# Patient Record
Sex: FEMALE | Race: ASIAN | NOT HISPANIC OR LATINO | URBAN - METROPOLITAN AREA
[De-identification: names, ages, dates, MRNs, and addresses within clinical notes are randomized per-mention and may not be internally consistent; named-entity substitution may affect disease eponyms.]

---

## 2024-04-14 ENCOUNTER — INPATIENT (INPATIENT)
Facility: HOSPITAL | Age: 84
LOS: 3 days | Discharge: HOME HEALTH | End: 2024-04-18
Attending: INTERNAL MEDICINE | Admitting: INTERNAL MEDICINE
Payer: MEDICARE

## 2024-04-14 VITALS
TEMPERATURE: 99 F | HEART RATE: 97 BPM | RESPIRATION RATE: 18 BRPM | DIASTOLIC BLOOD PRESSURE: 75 MMHG | SYSTOLIC BLOOD PRESSURE: 160 MMHG | OXYGEN SATURATION: 95 %

## 2024-04-14 DIAGNOSIS — A41.9 SEPSIS, UNSPECIFIED ORGANISM: ICD-10-CM

## 2024-04-14 LAB
ADD ON TEST-SPECIMEN IN LAB: SIGNIFICANT CHANGE UP
ALBUMIN SERPL ELPH-MCNC: 2.6 G/DL — LOW (ref 3.3–5)
ALBUMIN SERPL ELPH-MCNC: 3.9 G/DL — SIGNIFICANT CHANGE UP (ref 3.3–5)
ALP SERPL-CCNC: 44 U/L — SIGNIFICANT CHANGE UP (ref 40–120)
ALP SERPL-CCNC: 85 U/L — SIGNIFICANT CHANGE UP (ref 40–120)
ALT FLD-CCNC: 13 U/L — SIGNIFICANT CHANGE UP (ref 4–33)
ALT FLD-CCNC: 16 U/L — SIGNIFICANT CHANGE UP (ref 4–33)
ANION GAP SERPL CALC-SCNC: 13 MMOL/L — SIGNIFICANT CHANGE UP (ref 7–14)
ANION GAP SERPL CALC-SCNC: 14 MMOL/L — SIGNIFICANT CHANGE UP (ref 7–14)
ANION GAP SERPL CALC-SCNC: 17 MMOL/L — HIGH (ref 7–14)
ANISOCYTOSIS BLD QL: SLIGHT — SIGNIFICANT CHANGE UP
APPEARANCE UR: CLEAR — SIGNIFICANT CHANGE UP
APTT BLD: 28.8 SEC — SIGNIFICANT CHANGE UP (ref 24.5–35.6)
AST SERPL-CCNC: 35 U/L — HIGH (ref 4–32)
AST SERPL-CCNC: 43 U/L — HIGH (ref 4–32)
B-OH-BUTYR SERPL-SCNC: 0.3 MMOL/L — SIGNIFICANT CHANGE UP (ref 0–0.4)
BACTERIA # UR AUTO: ABNORMAL /HPF
BASE EXCESS BLDV CALC-SCNC: -5.1 MMOL/L — LOW (ref -2–3)
BASE EXCESS BLDV CALC-SCNC: 1.9 MMOL/L — SIGNIFICANT CHANGE UP (ref -2–3)
BASOPHILS # BLD AUTO: 0.03 K/UL — SIGNIFICANT CHANGE UP (ref 0–0.2)
BASOPHILS NFR BLD AUTO: 0.4 % — SIGNIFICANT CHANGE UP (ref 0–2)
BILIRUB SERPL-MCNC: 0.4 MG/DL — SIGNIFICANT CHANGE UP (ref 0.2–1.2)
BILIRUB SERPL-MCNC: 0.5 MG/DL — SIGNIFICANT CHANGE UP (ref 0.2–1.2)
BILIRUB UR-MCNC: NEGATIVE — SIGNIFICANT CHANGE UP
BLOOD GAS VENOUS COMPREHENSIVE RESULT: SIGNIFICANT CHANGE UP
BLOOD GAS VENOUS COMPREHENSIVE RESULT: SIGNIFICANT CHANGE UP
BUN SERPL-MCNC: 35 MG/DL — HIGH (ref 7–23)
BUN SERPL-MCNC: 37 MG/DL — HIGH (ref 7–23)
BUN SERPL-MCNC: 43 MG/DL — HIGH (ref 7–23)
CALCIUM SERPL-MCNC: 7.3 MG/DL — LOW (ref 8.4–10.5)
CALCIUM SERPL-MCNC: 7.9 MG/DL — LOW (ref 8.4–10.5)
CALCIUM SERPL-MCNC: 9.8 MG/DL — SIGNIFICANT CHANGE UP (ref 8.4–10.5)
CAST: 0 /LPF — SIGNIFICANT CHANGE UP (ref 0–4)
CHLORIDE BLDV-SCNC: 104 MMOL/L — SIGNIFICANT CHANGE UP (ref 96–108)
CHLORIDE BLDV-SCNC: 99 MMOL/L — SIGNIFICANT CHANGE UP (ref 96–108)
CHLORIDE SERPL-SCNC: 104 MMOL/L — SIGNIFICANT CHANGE UP (ref 98–107)
CHLORIDE SERPL-SCNC: 107 MMOL/L — SIGNIFICANT CHANGE UP (ref 98–107)
CHLORIDE SERPL-SCNC: 98 MMOL/L — SIGNIFICANT CHANGE UP (ref 98–107)
CO2 BLDV-SCNC: 23 MMOL/L — SIGNIFICANT CHANGE UP (ref 22–26)
CO2 BLDV-SCNC: 26.7 MMOL/L — HIGH (ref 22–26)
CO2 SERPL-SCNC: 18 MMOL/L — LOW (ref 22–31)
CO2 SERPL-SCNC: 19 MMOL/L — LOW (ref 22–31)
CO2 SERPL-SCNC: 22 MMOL/L — SIGNIFICANT CHANGE UP (ref 22–31)
COLOR SPEC: YELLOW — SIGNIFICANT CHANGE UP
CREAT SERPL-MCNC: 1.48 MG/DL — HIGH (ref 0.5–1.3)
CREAT SERPL-MCNC: 1.52 MG/DL — HIGH (ref 0.5–1.3)
CREAT SERPL-MCNC: 1.59 MG/DL — HIGH (ref 0.5–1.3)
DIFF PNL FLD: ABNORMAL
EGFR: 32 ML/MIN/1.73M2 — LOW
EGFR: 34 ML/MIN/1.73M2 — LOW
EGFR: 35 ML/MIN/1.73M2 — LOW
EOSINOPHIL # BLD AUTO: 0 K/UL — SIGNIFICANT CHANGE UP (ref 0–0.5)
EOSINOPHIL NFR BLD AUTO: 0 % — SIGNIFICANT CHANGE UP (ref 0–6)
FLUAV AG NPH QL: SIGNIFICANT CHANGE UP
FLUBV AG NPH QL: SIGNIFICANT CHANGE UP
GAS PNL BLDV: 133 MMOL/L — LOW (ref 136–145)
GAS PNL BLDV: 134 MMOL/L — LOW (ref 136–145)
GAS PNL BLDV: SIGNIFICANT CHANGE UP
GAS PNL BLDV: SIGNIFICANT CHANGE UP
GIANT PLATELETS BLD QL SMEAR: PRESENT — SIGNIFICANT CHANGE UP
GLUCOSE BLDV-MCNC: 129 MG/DL — HIGH (ref 70–99)
GLUCOSE BLDV-MCNC: 223 MG/DL — HIGH (ref 70–99)
GLUCOSE SERPL-MCNC: 121 MG/DL — HIGH (ref 70–99)
GLUCOSE SERPL-MCNC: 127 MG/DL — HIGH (ref 70–99)
GLUCOSE SERPL-MCNC: 232 MG/DL — HIGH (ref 70–99)
GLUCOSE UR QL: >=1000 MG/DL
HCO3 BLDV-SCNC: 22 MMOL/L — SIGNIFICANT CHANGE UP (ref 22–29)
HCO3 BLDV-SCNC: 26 MMOL/L — SIGNIFICANT CHANGE UP (ref 22–29)
HCT VFR BLD CALC: 34.9 % — SIGNIFICANT CHANGE UP (ref 34.5–45)
HCT VFR BLDA CALC: 32 % — LOW (ref 34.5–46.5)
HCT VFR BLDA CALC: 39 % — SIGNIFICANT CHANGE UP (ref 34.5–46.5)
HGB BLD CALC-MCNC: 10.7 G/DL — LOW (ref 11.7–16.1)
HGB BLD CALC-MCNC: 13 G/DL — SIGNIFICANT CHANGE UP (ref 11.7–16.1)
HGB BLD-MCNC: 12.5 G/DL — SIGNIFICANT CHANGE UP (ref 11.5–15.5)
HYPOCHROMIA BLD QL: SLIGHT — SIGNIFICANT CHANGE UP
IANC: 8.16 K/UL — HIGH (ref 1.8–7.4)
INR BLD: 0.97 RATIO — SIGNIFICANT CHANGE UP (ref 0.85–1.18)
KETONES UR-MCNC: NEGATIVE MG/DL — SIGNIFICANT CHANGE UP
LACTATE BLDV-MCNC: 2.9 MMOL/L — HIGH (ref 0.5–2)
LACTATE BLDV-MCNC: 3.4 MMOL/L — HIGH (ref 0.5–2)
LACTATE SERPL-SCNC: 2.6 MMOL/L — HIGH (ref 0.5–2)
LEUKOCYTE ESTERASE UR-ACNC: ABNORMAL
LYMPHOCYTES # BLD AUTO: 0 % — LOW (ref 13–44)
LYMPHOCYTES # BLD AUTO: 0 K/UL — LOW (ref 1–3.3)
MAGNESIUM SERPL-MCNC: 1.6 MG/DL — SIGNIFICANT CHANGE UP (ref 1.6–2.6)
MANUAL SMEAR VERIFICATION: SIGNIFICANT CHANGE UP
MCHC RBC-ENTMCNC: 30.6 PG — SIGNIFICANT CHANGE UP (ref 27–34)
MCHC RBC-ENTMCNC: 35.8 GM/DL — SIGNIFICANT CHANGE UP (ref 32–36)
MCV RBC AUTO: 85.5 FL — SIGNIFICANT CHANGE UP (ref 80–100)
METAMYELOCYTES # FLD: 2.6 % — HIGH (ref 0–1)
MICROCYTES BLD QL: SIGNIFICANT CHANGE UP
MONOCYTES # BLD AUTO: 0.23 K/UL — SIGNIFICANT CHANGE UP (ref 0–0.9)
MONOCYTES NFR BLD AUTO: 2.7 % — SIGNIFICANT CHANGE UP (ref 2–14)
NEUTROPHILS # BLD AUTO: 7.89 K/UL — HIGH (ref 1.8–7.4)
NEUTROPHILS NFR BLD AUTO: 66.1 % — SIGNIFICANT CHANGE UP (ref 43–77)
NEUTS BAND # BLD: 25.6 % — CRITICAL HIGH (ref 0–6)
NITRITE UR-MCNC: POSITIVE
NRBC # BLD: 0 /100 WBCS — SIGNIFICANT CHANGE UP (ref 0–0)
PCO2 BLDV: 36 MMHG — LOW (ref 39–52)
PCO2 BLDV: 46 MMHG — SIGNIFICANT CHANGE UP (ref 39–52)
PH BLDV: 7.28 — LOW (ref 7.32–7.43)
PH BLDV: 7.46 — HIGH (ref 7.32–7.43)
PH UR: 5.5 — SIGNIFICANT CHANGE UP (ref 5–8)
PHOSPHATE SERPL-MCNC: 2.2 MG/DL — LOW (ref 2.5–4.5)
PLAT MORPH BLD: ABNORMAL
PLATELET # BLD AUTO: 112 K/UL — LOW (ref 150–400)
PLATELET COUNT - ESTIMATE: ABNORMAL
PO2 BLDV: 29 MMHG — SIGNIFICANT CHANGE UP (ref 25–45)
PO2 BLDV: 45 MMHG — SIGNIFICANT CHANGE UP (ref 25–45)
POIKILOCYTOSIS BLD QL AUTO: SLIGHT — SIGNIFICANT CHANGE UP
POLYCHROMASIA BLD QL SMEAR: SIGNIFICANT CHANGE UP
POTASSIUM BLDV-SCNC: 2.4 MMOL/L — CRITICAL LOW (ref 3.5–5.1)
POTASSIUM BLDV-SCNC: 2.7 MMOL/L — CRITICAL LOW (ref 3.5–5.1)
POTASSIUM SERPL-MCNC: 2.5 MMOL/L — CRITICAL LOW (ref 3.5–5.3)
POTASSIUM SERPL-MCNC: 2.7 MMOL/L — CRITICAL LOW (ref 3.5–5.3)
POTASSIUM SERPL-MCNC: 2.9 MMOL/L — CRITICAL LOW (ref 3.5–5.3)
POTASSIUM SERPL-SCNC: 2.5 MMOL/L — CRITICAL LOW (ref 3.5–5.3)
POTASSIUM SERPL-SCNC: 2.7 MMOL/L — CRITICAL LOW (ref 3.5–5.3)
POTASSIUM SERPL-SCNC: 2.9 MMOL/L — CRITICAL LOW (ref 3.5–5.3)
PROT SERPL-MCNC: 4.8 G/DL — LOW (ref 6–8.3)
PROT SERPL-MCNC: 7.1 G/DL — SIGNIFICANT CHANGE UP (ref 6–8.3)
PROT UR-MCNC: NEGATIVE MG/DL — SIGNIFICANT CHANGE UP
PROTHROM AB SERPL-ACNC: 11 SEC — SIGNIFICANT CHANGE UP (ref 9.5–13)
RBC # BLD: 4.08 M/UL — SIGNIFICANT CHANGE UP (ref 3.8–5.2)
RBC # FLD: 14.4 % — SIGNIFICANT CHANGE UP (ref 10.3–14.5)
RBC BLD AUTO: ABNORMAL
RBC CASTS # UR COMP ASSIST: 12 /HPF — HIGH (ref 0–4)
RSV RNA NPH QL NAA+NON-PROBE: SIGNIFICANT CHANGE UP
SAO2 % BLDV: 41.3 % — LOW (ref 67–88)
SAO2 % BLDV: 77.6 % — SIGNIFICANT CHANGE UP (ref 67–88)
SARS-COV-2 RNA SPEC QL NAA+PROBE: SIGNIFICANT CHANGE UP
SMUDGE CELLS # BLD: PRESENT — SIGNIFICANT CHANGE UP
SODIUM SERPL-SCNC: 137 MMOL/L — SIGNIFICANT CHANGE UP (ref 135–145)
SODIUM SERPL-SCNC: 137 MMOL/L — SIGNIFICANT CHANGE UP (ref 135–145)
SODIUM SERPL-SCNC: 138 MMOL/L — SIGNIFICANT CHANGE UP (ref 135–145)
SP GR SPEC: 1.02 — SIGNIFICANT CHANGE UP (ref 1–1.03)
SQUAMOUS # UR AUTO: 1 /HPF — SIGNIFICANT CHANGE UP (ref 0–5)
STOMATOCYTES BLD QL SMEAR: SLIGHT — SIGNIFICANT CHANGE UP
TROPONIN T, HIGH SENSITIVITY RESULT: 21 NG/L — SIGNIFICANT CHANGE UP
UROBILINOGEN FLD QL: 0.2 MG/DL — SIGNIFICANT CHANGE UP (ref 0.2–1)
VARIANT LYMPHS # BLD: 2.6 % — SIGNIFICANT CHANGE UP (ref 0–6)
WBC # BLD: 8.6 K/UL — SIGNIFICANT CHANGE UP (ref 3.8–10.5)
WBC # FLD AUTO: 8.6 K/UL — SIGNIFICANT CHANGE UP (ref 3.8–10.5)
WBC UR QL: 9 /HPF — HIGH (ref 0–5)

## 2024-04-14 PROCEDURE — 74177 CT ABD & PELVIS W/CONTRAST: CPT | Mod: 26,MC

## 2024-04-14 PROCEDURE — 99285 EMERGENCY DEPT VISIT HI MDM: CPT

## 2024-04-14 PROCEDURE — 71045 X-RAY EXAM CHEST 1 VIEW: CPT | Mod: 26

## 2024-04-14 PROCEDURE — 99283 EMERGENCY DEPT VISIT LOW MDM: CPT | Mod: GC

## 2024-04-14 PROCEDURE — 74176 CT ABD & PELVIS W/O CONTRAST: CPT | Mod: 26,59,MC

## 2024-04-14 RX ORDER — SODIUM CHLORIDE 9 MG/ML
1000 INJECTION, SOLUTION INTRAVENOUS ONCE
Refills: 0 | Status: COMPLETED | OUTPATIENT
Start: 2024-04-14 | End: 2024-04-14

## 2024-04-14 RX ORDER — SODIUM CHLORIDE 9 MG/ML
1000 INJECTION INTRAMUSCULAR; INTRAVENOUS; SUBCUTANEOUS ONCE
Refills: 0 | Status: COMPLETED | OUTPATIENT
Start: 2024-04-14 | End: 2024-04-14

## 2024-04-14 RX ORDER — VANCOMYCIN HCL 1 G
1000 VIAL (EA) INTRAVENOUS ONCE
Refills: 0 | Status: COMPLETED | OUTPATIENT
Start: 2024-04-14 | End: 2024-04-14

## 2024-04-14 RX ORDER — NOREPINEPHRINE BITARTRATE/D5W 8 MG/250ML
0.05 PLASTIC BAG, INJECTION (ML) INTRAVENOUS
Qty: 8 | Refills: 0 | Status: DISCONTINUED | OUTPATIENT
Start: 2024-04-14 | End: 2024-04-14

## 2024-04-14 RX ORDER — ACETAMINOPHEN 500 MG
1000 TABLET ORAL ONCE
Refills: 0 | Status: COMPLETED | OUTPATIENT
Start: 2024-04-14 | End: 2024-04-14

## 2024-04-14 RX ORDER — POTASSIUM CHLORIDE 20 MEQ
40 PACKET (EA) ORAL ONCE
Refills: 0 | Status: COMPLETED | OUTPATIENT
Start: 2024-04-14 | End: 2024-04-14

## 2024-04-14 RX ORDER — POTASSIUM CHLORIDE 20 MEQ
10 PACKET (EA) ORAL
Refills: 0 | Status: COMPLETED | OUTPATIENT
Start: 2024-04-14 | End: 2024-04-15

## 2024-04-14 RX ORDER — PIPERACILLIN AND TAZOBACTAM 4; .5 G/20ML; G/20ML
3.38 INJECTION, POWDER, LYOPHILIZED, FOR SOLUTION INTRAVENOUS ONCE
Refills: 0 | Status: COMPLETED | OUTPATIENT
Start: 2024-04-14 | End: 2024-04-14

## 2024-04-14 RX ORDER — MAGNESIUM SULFATE 500 MG/ML
1 VIAL (ML) INJECTION ONCE
Refills: 0 | Status: COMPLETED | OUTPATIENT
Start: 2024-04-14 | End: 2024-04-14

## 2024-04-14 RX ORDER — POTASSIUM CHLORIDE 20 MEQ
10 PACKET (EA) ORAL
Refills: 0 | Status: COMPLETED | OUTPATIENT
Start: 2024-04-14 | End: 2024-04-14

## 2024-04-14 RX ORDER — SODIUM CHLORIDE 9 MG/ML
1000 INJECTION, SOLUTION INTRAVENOUS
Refills: 0 | Status: DISCONTINUED | OUTPATIENT
Start: 2024-04-14 | End: 2024-04-15

## 2024-04-14 RX ADMIN — SODIUM CHLORIDE 1000 MILLILITER(S): 9 INJECTION INTRAMUSCULAR; INTRAVENOUS; SUBCUTANEOUS at 13:44

## 2024-04-14 RX ADMIN — SODIUM CHLORIDE 1000 MILLILITER(S): 9 INJECTION INTRAMUSCULAR; INTRAVENOUS; SUBCUTANEOUS at 15:03

## 2024-04-14 RX ADMIN — Medication 250 MILLIGRAM(S): at 14:36

## 2024-04-14 RX ADMIN — Medication 1000 MILLIGRAM(S): at 14:03

## 2024-04-14 RX ADMIN — SODIUM CHLORIDE 1000 MILLILITER(S): 9 INJECTION, SOLUTION INTRAVENOUS at 19:22

## 2024-04-14 RX ADMIN — Medication 100 GRAM(S): at 15:51

## 2024-04-14 RX ADMIN — Medication 1 GRAM(S): at 16:51

## 2024-04-14 RX ADMIN — Medication 100 MILLIEQUIVALENT(S): at 17:24

## 2024-04-14 RX ADMIN — Medication 1000 MILLIGRAM(S): at 15:36

## 2024-04-14 RX ADMIN — SODIUM CHLORIDE 1000 MILLILITER(S): 9 INJECTION INTRAMUSCULAR; INTRAVENOUS; SUBCUTANEOUS at 14:44

## 2024-04-14 RX ADMIN — SODIUM CHLORIDE 1000 MILLILITER(S): 9 INJECTION INTRAMUSCULAR; INTRAVENOUS; SUBCUTANEOUS at 17:51

## 2024-04-14 RX ADMIN — SODIUM CHLORIDE 300 MILLILITER(S): 9 INJECTION INTRAMUSCULAR; INTRAVENOUS; SUBCUTANEOUS at 17:24

## 2024-04-14 RX ADMIN — Medication 100 MILLIEQUIVALENT(S): at 17:32

## 2024-04-14 RX ADMIN — SODIUM CHLORIDE 150 MILLILITER(S): 9 INJECTION, SOLUTION INTRAVENOUS at 21:59

## 2024-04-14 RX ADMIN — PIPERACILLIN AND TAZOBACTAM 3.38 GRAM(S): 4; .5 INJECTION, POWDER, LYOPHILIZED, FOR SOLUTION INTRAVENOUS at 14:34

## 2024-04-14 RX ADMIN — Medication 10 MILLIEQUIVALENT(S): at 18:32

## 2024-04-14 RX ADMIN — Medication 100 MILLIEQUIVALENT(S): at 19:40

## 2024-04-14 RX ADMIN — Medication 400 MILLIGRAM(S): at 13:48

## 2024-04-14 RX ADMIN — PIPERACILLIN AND TAZOBACTAM 200 GRAM(S): 4; .5 INJECTION, POWDER, LYOPHILIZED, FOR SOLUTION INTRAVENOUS at 14:04

## 2024-04-14 RX ADMIN — SODIUM CHLORIDE 1000 MILLILITER(S): 9 INJECTION, SOLUTION INTRAVENOUS at 18:22

## 2024-04-14 RX ADMIN — Medication 10 MILLIEQUIVALENT(S): at 18:24

## 2024-04-14 RX ADMIN — Medication 1000 MILLIGRAM(S): at 14:18

## 2024-04-14 RX ADMIN — SODIUM CHLORIDE 1000 MILLILITER(S): 9 INJECTION INTRAMUSCULAR; INTRAVENOUS; SUBCUTANEOUS at 14:03

## 2024-04-14 NOTE — CONSULT NOTE ADULT - ATTENDING COMMENTS
82 yo female with pmh as above presenting with fever and found to have acute pyelonephritis and possible R ureteral stricture.  She received 3L of crystalloid over the last 8 hours and BP was soft.  After another bolus of fluid and maintenance ivf her BP stabilized.  She is awake, alert, oriented, mandarin speaking with daughter interpreting.  In NAD sBP now 113  HR 70's  RA 98%  She has been evaluated by urology, received Abx, is hemodynamically stable, and does not require MICU level of care.  D/W pt's daughter and with ED team

## 2024-04-14 NOTE — ED ADULT NURSE NOTE - NSFALLHARMRISKINTERV_ED_ALL_ED

## 2024-04-14 NOTE — ED ADULT NURSE NOTE - OBJECTIVE STATEMENT
Received patient in room 19 c/o hyperglycemia. Patient denies SOB, chest pain. PMHX DM, HTN, CVA w/left sided weakness. Patient is A&OX1, airway patent, breathing unlabored and even, radial pulses palpable. Side rails up and safety maintained. Fall precaution in place. Call bells within reach. Family at the bedside.

## 2024-04-14 NOTE — CONSULT NOTE ADULT - ASSESSMENT
HPI: 82 Y/O F w/ PMH of DM, HTN, CVA w/ LT sided deficits presents to ER w/ fever. Per daughter has increasing lethargy, elevated blood sugar, episode of nausea today w/ vomiting prompting call to EMS. Associated chills and subjective fever. No hx of urological conditions/surgery. Denies dizziness, headache, chest pain, shortness of breath, dysuria or hematuria. Noted to be febrile to 104.5 rectal. During ER course no serum WBC count, Bandemia 25K, Cr 1.48, K+ 2.9, elevated lactate 3.7, UA demonstrating positive Leukocytes and Nitrites. CT concerning for Extensive right retroperitoneal stranding/inflammatory change felt to be secondary to possible infection/inflammation of the right kidney. Abrupt changing caliber of the right ureter as described above at the level of the mid right ureter; etiology indeterminate. It appears to be a stricture with resultant upstream hydronephrosis. Given IV Vancomycin, Zosyn, Tylenol and Potassium repleted.      No acute urological intervention  Recommend CT abd/pelvis non-contrast assess for obstruction at RT mid RT ureter  ABX per primary team  Trend CR  Follow up urine CX  [facility] Maximum Drainage   HPI: 84 Y/O F w/ PMH of DM, HTN, CVA w/ LT sided deficits presents to ER w/ fever. Per daughter has increasing lethargy, elevated blood sugar, episode of nausea today w/ vomiting prompting call to EMS. Associated chills and subjective fever. No hx of urological conditions/surgery. Denies dizziness, headache, chest pain, shortness of breath, dysuria or hematuria. Noted to be febrile to 104.5 rectal. During ER course no serum WBC count, Bandemia 25K, Cr 1.48, K+ 2.9, elevated lactate 3.7, UA demonstrating positive Leukocytes and Nitrites. CT concerning for Extensive right retroperitoneal stranding/inflammatory change felt to be secondary to possible infection/inflammation of the right kidney. Abrupt changing caliber of the right ureter as described above at the level of the mid right ureter; etiology indeterminate. It appears to be a stricture with resultant upstream hydronephrosis. Given IV Vancomycin, Zosyn, Tylenol and Potassium repleted.      No acute urological intervention  Recommend CT abd/pelvis non-contrast assess for obstruction at RT mid RT ureter which should catch delayed phase to see if contrast is going past area of questionable stricture  Patient is unlikely to have ureteral stricture given that she has never had ureteral manipulation/instrumentation and no history of radiation  Mild hydronephrosis in R kidney can be seen transiently in setting of pyelonephritis altering peristalsis of the ureter  Broad spectrum antibiotics  If patient does not improve with conservative measures would plan to reimage and then if patient continues to have hydronephrosis would consider intervention with PCN vs stent (likely favor PCN given concern for possible stricture)  ABX per primary team  Trend CR  Follow up urine CX  [facility] Maximum Drainage  Discussed with Dr. Campbell

## 2024-04-14 NOTE — ED PROVIDER NOTE - CLINICAL SUMMARY MEDICAL DECISION MAKING FREE TEXT BOX
84 yo female with vomiting and elevated FS, DKA?  pt also has tenderness in lower abd, UTI?   pt found to be febrile to 104  will do sepsis work up

## 2024-04-14 NOTE — CONSULT NOTE ADULT - SUBJECTIVE AND OBJECTIVE BOX
Urology Consult    HPI: 82 Y/O F w/ PMH of DM, HTN, CVA w/ LT sided deficits presents to ER w/ fever. Per daughter has increasing lethargy, elevated blood sugar, episode of nausea today w/ vomiting prompting call to EMS. Associated chills and subjective fever. No hx of urological conditions/surgery. Denies dizziness, headache, chest pain, shortness of breath, dysuria or hematuria. Noted to be febrile to 104.5 rectal. During ER course no serum WBC count, Bandemia 25K, Cr 1.48, K+ 2.9, elevated lactate 3.7, UA demonstrating positive Leukocytes and Nitrites. CT concerning for Extensive right retroperitoneal stranding/inflammatory change felt to be secondary to possible infection/inflammation of the right kidney. Abrupt changing caliber of the right ureter as described above at the level of the mid right ureter; etiology indeterminate. It appears to be a stricture with resultant upstream hydronephrosis. Given IV Vancomycin, Zosyn, Tylenol and Potassium repleted.      PAST MEDICAL & SURGICAL HISTORY:    FAMILY HISTORY:    SOCIAL HISTORY:   Tobacco hx:    MEDICATIONS  (STANDING):  potassium chloride  10 mEq/100 mL IVPB 10 milliEquivalent(s) IV Intermittent every 1 hour    MEDICATIONS  (PRN):    Allergies    Allergy Status Unknown    Intolerances      REVIEW OF SYSTEMS: Pertinent positives and negatives as stated in HPI, otherwise negative    Vital signs  T(C): 36.9 (24 @ 19:20), Max: 40.3 (24 @ 13:28)  HR: 75 (24 @ 19:20)  BP: 104/58 (24 @ 19:20)  SpO2: 100% (24 @ 19:20)  Wt(kg): --    Vital signs reviewed.   CONSTITUTIONAL: Well-appearing; well-nourished; in no apparent distress. Non-toxic appearing.   HEAD: Normocephalic, atraumatic.  EYES: Normal conjunctiva and no sclera injection noted  ENT: Normal nose; no rhinorrhea.  CARD: Normal S1, S2  RESP: Normal chest excursion with respiration; breath sounds clear and equal bilaterally  ABD/GI: soft, non-distended; generalized tenderness  EXT/MS: moves all extremities; distal pulses are normal, no pedal edema.  SKIN: Normal for age and race; warm; dry; good turgor; no apparent lesions or exudate noted.  NEURO: Awake, alert, oriented x 3.  PSYCH: Normal mood; appropriate affect.      LABS:     @ 13:52    WBC 8.60  / Hct 34.9  / SCr 1.48         137  |  98  |  43<H>  ----------------------------<  232<H>  2.9<LL>   |  22  |  1.48<H>    Ca    9.8      2024 13:52  Mg     1.70         TPro  7.1  /  Alb  3.9  /  TBili  0.5  /  DBili  x   /  AST  35<H>  /  ALT  13  /  AlkPhos  85      PT/INR - ( 2024 13:52 )   PT: 11.0 sec;   INR: 0.97 ratio         PTT - ( 2024 13:52 )  PTT:28.8 sec  Urinalysis Basic - ( 2024 15:17 )    Color: Yellow / Appearance: Clear / S.017 / pH: x  Gluc: x / Ketone: Negative mg/dL  / Bili: Negative / Urobili: 0.2 mg/dL   Blood: x / Protein: Negative mg/dL / Nitrite: Positive   Leuk Esterase: Trace / RBC: 12 /HPF / WBC 9 /HPF   Sq Epi: x / Non Sq Epi: 1 /HPF / Bacteria: Moderate /HPF        Urine Cx:   Blood Cx:    RADIOLOGY:      ACC: [id number] EXAM:  CT ABDOMEN AND PELVIS IC   ORDERED BY: JEAN CLAUDE ALVAREZ     PROCEDURE DATE:  2024          INTERPRETATION:  CLINICAL INFORMATION: Abdominal pain, fever    COMPARISON: None.    CONTRAST/COMPLICATIONS:  IV Contrast: Omnipaque 350  90 cc administered   10 cc discarded  Oral Contrast: NONE  Complications: None reported at time of study completion    PROCEDURE:  CT of the Abdomen and Pelvis was performed.  Sagittal and coronal reformats were performed.    FINDINGS:  LOWER CHEST: Bibasilar mild dependent focal atelectasis.    LIVER: Within normal limits.  BILE DUCTS: Normal caliber.  GALLBLADDER: Not visualized  SPLEEN: Within normal limits.  PANCREAS: Within normal limits.  ADRENALS: Within normal limits.  KIDNEYS/URETERS: Moderate right pelviectasis and ureterectasis to the   level of the mid right ureter where there is a transition point to a   narrow caliber (601:47), where there appears to be a stricture or   narrowing in the ureter. Punctate nonobstructing calculus upper pole   right kidney. Extensive right perinephric fat stranding which extends   into the right retroperitoneum.. Unremarkable appearance of left kidney,   aside from mild atrophic changes    BLADDER: Within normal limits.  REPRODUCTIVE ORGANS: Within normal limits    BOWEL: No bowel obstruction. Appendix is normal.  PERITONEUM: No ascites.  VESSELS: Atherosclerotic calcifications  RETROPERITONEUM/LYMPH NODES: Right retroperitoneal inflammatory changes   as described above extending from the right perinephric space into the   right paracolic gutter with trace right paracolic gutter free fluid   (2:83).  ABDOMINAL WALL: Within normal limits.  BONES: Degenerative changes.    IMPRESSION:  Extensive right retroperitoneal stranding/inflammatory change felt to be   secondary to possible infection/inflammation of the right kidney. Abrupt   changing caliber of the right ureter as described above at the level of   the mid right ureter; etiology indeterminate. It appears to be a   stricture with resultant upstream hydronephrosis. If clinically indicated   direct visualization may be helpful for further evaluation.      --- End of Report ---        DEMETRIA ARAYA MD; Attending Radiologist  This document has been electronically signed. 2024  6:24PM
CHIEF COMPLAINT: Hyperglycemia    History provided mainly by daughter at bedside, patient is Mandarin speaking, preferred daughter at bedside to translate.   HPI: Ms. Messina is an 82 YO woman with PMH of DM2, HTN, CVA w/ LT sided deficits, recurrent UTIs (per daughter), who presents to ER with hyperglycemia. Per daughter, patient noted with fever, increasing lethargy, elevated blood sugar, b/l back pain and an episode of vomiting prompting call to EMS. She had lab work done, resulted yesterday with elevated BUN/Creatinine per daughter. Denies dizziness, headache, chest pain, shortness of breath, dysuria or hematuria. Notes chronic left leg pain. Daughter notes patient intermittently has had edema of the left lower extremity but that seems to have resolved today. Patient has been urinating more frequently than normal per daughter. Patient herself denies any pain, difficulty breathing. She had a brief period of disorientation in the ED, but at time of interview per daughter is oriented and behaving at baseline.     In the ED, her vitals were concerning for rectal temperature of 104.5, initially normotensive. Labs concerning for 25% bands, LAYLA, hypokalemia and elevated lactate. UA positive for bacteria, leukocyte esterase and nitrites. She was given 3L of fluids over 8 hours, and CT abdomen/pelvis with IV contrast was done which showed R retroperitoneal stranding and an abrupt change in caliber of the R ureter. Urology was consulted, felt patient likely has stricture vs. abnormal peristalsis of ureter iso pyelonephritis. She was started on vancomycin and zosyn and repeat CT abdomen/pelvis without contrast performed. MICU called for worsening hypotension, consideration for pressor support.     PAST MEDICAL & SURGICAL HISTORY:   PMH: as above  PSH: cholecystectomy    FAMILY HISTORY:      SOCIAL HISTORY:  Smoking: Denies  EtOH Use: Denies  Marital Status:  Occupation:  Recent Travel:  Country of Birth:  Advance Directives:    Allergies: NDKA        HOME MEDICATIONS:  losartan 50 qD, B complex, diclofenac gel BID, Tradjenta 5 mg qD, calcitriol 0.25 mcg qD, colace 100 mg qD, atorvastatin 10 qD, nifedipine ER 60 mg qD, metoprolol succinate ER 25 mg qD, nephplex qD, artificial tears, Plavix 75 qD, HCTZ 12.5 qD, vascepa 0.5g x4 qD, isosorbide mononitrate ER 30 mg qD, Jardiance 10 mg qD.     REVIEW OF SYSTEMS:  [X] All other systems negative except as per HPI  [ ] Unable to assess ROS because ________    OBJECTIVE:  ICU Vital Signs Last 24 Hrs  T(C): 36.9 (2024 19:20), Max: 40.3 (2024 13:28)  T(F): 98.5 (2024 19:20), Max: 104.5 (2024 13:28)  HR: 84 (2024 21:45) (71 - 97)  BP: 116/53 (2024 21:45) (78/48 - 160/75)  BP(mean): 71 (2024 21:45) (59 - 71)  ABP: --  ABP(mean): --  RR: 18 (2024 21:45) (16 - 26)  SpO2: 96% (2024 21:45) (95% - 100%)    O2 Parameters below as of 2024 21:45  Patient On (Oxygen Delivery Method): room air              CAPILLARY BLOOD GLUCOSE      POCT Blood Glucose.: 170 mg/dL (2024 17:26)      PHYSICAL EXAM:  GENERAL: Lying in bed in no acute distress, responding to questions appropriately  EYES: Conjunctiva noninjected or pale, sclera anicteric  HENT: NC/AT, moist mucous membranes  NECK: Supple, trachea midline  LUNG: Nonlabored respirations, no wheezes, rales  CV: RRR, Pulses- Radial: 2+ b/l  ABDOMEN: Nondistended, nontender, no suprapubic tenderness  MSK: No visible deformities, nontender extremities. No LE edema b/l  SKIN: No rashes, bruises  NEURO: AAOx4 (to person, place, time, event), no tremor. Residual deficits from CVA - weakness on L side  PSYCH: Normal mood and affect      HOSPITAL MEDICATIONS:  MEDICATIONS  (STANDING):  lactated ringers. 1000 milliLiter(s) (150 mL/Hr) IV Continuous <Continuous>  norepinephrine Infusion 0.05 MICROgram(s)/kG/Min (5.73 mL/Hr) IV Continuous <Continuous>    MEDICATIONS  (PRN):      LABS:                        12.5   8.60  )-----------( 112      ( 2024 13:52 )             34.9     14    137  |  98  |  43<H>  ----------------------------<  232<H>  2.9<LL>   |  22  |  1.48<H>    Ca    9.8      2024 13:52  Mg     1.70     14    TPro  7.1  /  Alb  3.9  /  TBili  0.5  /  DBili  x   /  AST  35<H>  /  ALT  13  /  AlkPhos  85  14    PT/INR - ( 2024 13:52 )   PT: 11.0 sec;   INR: 0.97 ratio         PTT - ( 2024 13:52 )  PTT:28.8 sec  Urinalysis Basic - ( 2024 15:17 )    Color: Yellow / Appearance: Clear / S.017 / pH: x  Gluc: x / Ketone: Negative mg/dL  / Bili: Negative / Urobili: 0.2 mg/dL   Blood: x / Protein: Negative mg/dL / Nitrite: Positive   Leuk Esterase: Trace / RBC: 12 /HPF / WBC 9 /HPF   Sq Epi: x / Non Sq Epi: 1 /HPF / Bacteria: Moderate /HPF        Venous Blood Gas:   @ 13:52  7.46/36/45/26/77.6  VBG Lactate: 2.9      MICROBIOLOGY:     RADIOLOGY:  [ ] Reviewed and interpreted by me    EKG:

## 2024-04-14 NOTE — ED ADULT TRIAGE NOTE - CHIEF COMPLAINT QUOTE
Patient c/o back pain and nausea starting 7am this morning.  fs AT HOME 296 PHx- DM1 on ozempic, HTN, HLD.

## 2024-04-14 NOTE — ED PROVIDER NOTE - OBJECTIVE STATEMENT
84yo F ho DM2, htn, CVA with left sided weakness, bedbound on Tradjenta and Jardiance and switched last week from glipizide to Ozempic injections, presents to the ED for hyperglycemia to 200s today. as per daughter, she had one episode of vomiting today. she states that she has been more lethargic than her baseline. she states she also had one episode of back pain where she took tylenol with symptomatic relief.

## 2024-04-14 NOTE — CONSULT NOTE ADULT - ASSESSMENT
HPI: Ms. Messina is an 84 YO woman with PMH of DM2, HTN, CVA w/ LT sided deficits, recurrent UTIs (per daughter), who presents to ER with hyperglycemia, vomiting, fevers, increased urine output. Vitals, labs and imaging concerning for sepsis 2/2 pyelonephritis. MICU consulted for hypotension after 3L fluid bolus.    At time of exam, /53 with additional fluids, no vasopressor support, patient mentating at baseline mental status.     Sepsis 2/2 pyelonephritis  - Antibiotics: continue broad-spectrum with vancomycin/zosyn  - IV fluids: s/p 4L fluids - consider TTE to assess cardiac function (unknown baseline), additional maintenance fluids as patient with no signs of volume overload. Monitor I&Os. Consider Cal[facility]oun placement   - Infectious workup: UA positive, follow up blood cultures, urine culture  - Likely should discontinue Jardiance as outpatient     ?Ureteral stricture - follow up repeat CT abdomen/pelvis non-con, urology recommendations    Patient does not have ICU level needs at this time given normotensive without vasopressor support, mentating at baseline. Please reconsult as necessary.

## 2024-04-14 NOTE — CONSULT NOTE ADULT - ATTENDING COMMENTS
Agree with assessment and plan.   CT reviewed delayed right renal excretion.  Events noted  Trend creatinine

## 2024-04-14 NOTE — ED PROVIDER NOTE - ATTENDING APP SHARED VISIT CONTRIBUTION OF CARE
82yo F ho DM2, htn, CVA with left sided weakness, bedbound on tradjetna and jardiance and switched last week from glipizide to ozempic injections, pw for hyperglycemia to 200s today. per daughter usually about 100-150 but today was higher so was concerned. also noted mother to have episode fo vomiting and also complained of backpain ealrier today that resovled after taking nsaid  pt well appearing, abd tender in right loewr abd pain  will check labas, ro dka though less likely, ua, CT, trop reassess

## 2024-04-14 NOTE — ED PROVIDER NOTE - PROGRESS NOTE DETAILS
Urology consulted. PRISCILLA Bird: Case endorsed. Urology recs appreciated, no acute surgical intervention. Pt's BP had dropped, and considered starting Levophed but then when we repeated her VS, pressure improved after 3L and she is mentating well. MICU consult appreciated. Pt to be admitted to medicine. Lab called to report prelim blood cultures positive, Both anaerobic and aerobic bottles positive for gram neg rods, organism identification to be available 3-5 hours. Pt recently admitted for sepsis and received IV abx.

## 2024-04-15 DIAGNOSIS — A41.9 SEPSIS, UNSPECIFIED ORGANISM: ICD-10-CM

## 2024-04-15 DIAGNOSIS — N17.9 ACUTE KIDNEY FAILURE, UNSPECIFIED: ICD-10-CM

## 2024-04-15 DIAGNOSIS — I10 ESSENTIAL (PRIMARY) HYPERTENSION: ICD-10-CM

## 2024-04-15 DIAGNOSIS — E11.9 TYPE 2 DIABETES MELLITUS WITHOUT COMPLICATIONS: ICD-10-CM

## 2024-04-15 DIAGNOSIS — N12 TUBULO-INTERSTITIAL NEPHRITIS, NOT SPECIFIED AS ACUTE OR CHRONIC: ICD-10-CM

## 2024-04-15 DIAGNOSIS — M81.0 AGE-RELATED OSTEOPOROSIS WITHOUT CURRENT PATHOLOGICAL FRACTURE: ICD-10-CM

## 2024-04-15 DIAGNOSIS — E87.6 HYPOKALEMIA: ICD-10-CM

## 2024-04-15 DIAGNOSIS — Z29.9 ENCOUNTER FOR PROPHYLACTIC MEASURES, UNSPECIFIED: ICD-10-CM

## 2024-04-15 LAB
ALBUMIN SERPL ELPH-MCNC: 2.7 G/DL — LOW (ref 3.3–5)
ALP SERPL-CCNC: 50 U/L — SIGNIFICANT CHANGE UP (ref 40–120)
ALT FLD-CCNC: 17 U/L — SIGNIFICANT CHANGE UP (ref 4–33)
ANION GAP SERPL CALC-SCNC: 13 MMOL/L — SIGNIFICANT CHANGE UP (ref 7–14)
ANION GAP SERPL CALC-SCNC: 20 MMOL/L — HIGH (ref 7–14)
AST SERPL-CCNC: 48 U/L — HIGH (ref 4–32)
BASE EXCESS BLDV CALC-SCNC: -3.3 MMOL/L — LOW (ref -2–3)
BASE EXCESS BLDV CALC-SCNC: -3.7 MMOL/L — LOW (ref -2–3)
BASE EXCESS BLDV CALC-SCNC: -5.5 MMOL/L — LOW (ref -2–3)
BASOPHILS # BLD AUTO: 0.06 K/UL — SIGNIFICANT CHANGE UP (ref 0–0.2)
BASOPHILS NFR BLD AUTO: 0.3 % — SIGNIFICANT CHANGE UP (ref 0–2)
BILIRUB SERPL-MCNC: 0.2 MG/DL — SIGNIFICANT CHANGE UP (ref 0.2–1.2)
BLOOD GAS VENOUS COMPREHENSIVE RESULT: SIGNIFICANT CHANGE UP
BLOOD GAS VENOUS COMPREHENSIVE RESULT: SIGNIFICANT CHANGE UP
BUN SERPL-MCNC: 33 MG/DL — HIGH (ref 7–23)
BUN SERPL-MCNC: 34 MG/DL — HIGH (ref 7–23)
CA-I SERPL-SCNC: 1.2 MMOL/L — SIGNIFICANT CHANGE UP (ref 1.15–1.33)
CALCIUM SERPL-MCNC: 7.3 MG/DL — LOW (ref 8.4–10.5)
CALCIUM SERPL-MCNC: 8.5 MG/DL — SIGNIFICANT CHANGE UP (ref 8.4–10.5)
CHLORIDE BLDV-SCNC: 102 MMOL/L — SIGNIFICANT CHANGE UP (ref 96–108)
CHLORIDE BLDV-SCNC: 103 MMOL/L — SIGNIFICANT CHANGE UP (ref 96–108)
CHLORIDE BLDV-SCNC: 104 MMOL/L — SIGNIFICANT CHANGE UP (ref 96–108)
CHLORIDE SERPL-SCNC: 100 MMOL/L — SIGNIFICANT CHANGE UP (ref 98–107)
CHLORIDE SERPL-SCNC: 104 MMOL/L — SIGNIFICANT CHANGE UP (ref 98–107)
CO2 BLDV-SCNC: 23.4 MMOL/L — SIGNIFICANT CHANGE UP (ref 22–26)
CO2 BLDV-SCNC: 24.1 MMOL/L — SIGNIFICANT CHANGE UP (ref 22–26)
CO2 BLDV-SCNC: 25.1 MMOL/L — SIGNIFICANT CHANGE UP (ref 22–26)
CO2 SERPL-SCNC: 17 MMOL/L — LOW (ref 22–31)
CO2 SERPL-SCNC: 21 MMOL/L — LOW (ref 22–31)
CREAT SERPL-MCNC: 1.59 MG/DL — HIGH (ref 0.5–1.3)
CREAT SERPL-MCNC: 1.71 MG/DL — HIGH (ref 0.5–1.3)
E COLI DNA BLD POS QL NAA+NON-PROBE: SIGNIFICANT CHANGE UP
EGFR: 30 ML/MIN/1.73M2 — LOW
EGFR: 32 ML/MIN/1.73M2 — LOW
EOSINOPHIL # BLD AUTO: 0.02 K/UL — SIGNIFICANT CHANGE UP (ref 0–0.5)
EOSINOPHIL NFR BLD AUTO: 0.1 % — SIGNIFICANT CHANGE UP (ref 0–6)
GAS PNL BLDV: 133 MMOL/L — LOW (ref 136–145)
GAS PNL BLDV: 134 MMOL/L — LOW (ref 136–145)
GAS PNL BLDV: 135 MMOL/L — LOW (ref 136–145)
GAS PNL BLDV: SIGNIFICANT CHANGE UP
GAS PNL BLDV: SIGNIFICANT CHANGE UP
GLUCOSE BLDC GLUCOMTR-MCNC: 102 MG/DL — HIGH (ref 70–99)
GLUCOSE BLDC GLUCOMTR-MCNC: 155 MG/DL — HIGH (ref 70–99)
GLUCOSE BLDV-MCNC: 106 MG/DL — HIGH (ref 70–99)
GLUCOSE BLDV-MCNC: 121 MG/DL — HIGH (ref 70–99)
GLUCOSE BLDV-MCNC: 142 MG/DL — HIGH (ref 70–99)
GLUCOSE SERPL-MCNC: 132 MG/DL — HIGH (ref 70–99)
GLUCOSE SERPL-MCNC: 145 MG/DL — HIGH (ref 70–99)
GRAM STN FLD: ABNORMAL
HCO3 BLDV-SCNC: 22 MMOL/L — SIGNIFICANT CHANGE UP (ref 22–29)
HCO3 BLDV-SCNC: 23 MMOL/L — SIGNIFICANT CHANGE UP (ref 22–29)
HCO3 BLDV-SCNC: 24 MMOL/L — SIGNIFICANT CHANGE UP (ref 22–29)
HCT VFR BLD CALC: 32.1 % — LOW (ref 34.5–45)
HCT VFR BLDA CALC: 36 % — SIGNIFICANT CHANGE UP (ref 34.5–46.5)
HCT VFR BLDA CALC: 36 % — SIGNIFICANT CHANGE UP (ref 34.5–46.5)
HCT VFR BLDA CALC: 41 % — SIGNIFICANT CHANGE UP (ref 34.5–46.5)
HGB BLD CALC-MCNC: 11.9 G/DL — SIGNIFICANT CHANGE UP (ref 11.7–16.1)
HGB BLD CALC-MCNC: 12.1 G/DL — SIGNIFICANT CHANGE UP (ref 11.7–16.1)
HGB BLD CALC-MCNC: 13.5 G/DL — SIGNIFICANT CHANGE UP (ref 11.7–16.1)
HGB BLD-MCNC: 11.1 G/DL — LOW (ref 11.5–15.5)
IANC: 18.66 K/UL — HIGH (ref 1.8–7.4)
IMM GRANULOCYTES NFR BLD AUTO: 2.7 % — HIGH (ref 0–0.9)
LACTATE BLDV-MCNC: 2.7 MMOL/L — HIGH (ref 0.5–2)
LACTATE BLDV-MCNC: 2.8 MMOL/L — HIGH (ref 0.5–2)
LACTATE BLDV-MCNC: 4.1 MMOL/L — CRITICAL HIGH (ref 0.5–2)
LYMPHOCYTES # BLD AUTO: 0.69 K/UL — LOW (ref 1–3.3)
LYMPHOCYTES # BLD AUTO: 3.4 % — LOW (ref 13–44)
MCHC RBC-ENTMCNC: 30 PG — SIGNIFICANT CHANGE UP (ref 27–34)
MCHC RBC-ENTMCNC: 34.6 GM/DL — SIGNIFICANT CHANGE UP (ref 32–36)
MCV RBC AUTO: 86.8 FL — SIGNIFICANT CHANGE UP (ref 80–100)
METHOD TYPE: SIGNIFICANT CHANGE UP
MONOCYTES # BLD AUTO: 0.46 K/UL — SIGNIFICANT CHANGE UP (ref 0–0.9)
MONOCYTES NFR BLD AUTO: 2.2 % — SIGNIFICANT CHANGE UP (ref 2–14)
NEUTROPHILS # BLD AUTO: 18.66 K/UL — HIGH (ref 1.8–7.4)
NEUTROPHILS NFR BLD AUTO: 91.3 % — HIGH (ref 43–77)
NRBC # BLD: 0 /100 WBCS — SIGNIFICANT CHANGE UP (ref 0–0)
NRBC # FLD: 0.02 K/UL — HIGH (ref 0–0)
PCO2 BLDV: 45 MMHG — SIGNIFICANT CHANGE UP (ref 39–52)
PCO2 BLDV: 49 MMHG — SIGNIFICANT CHANGE UP (ref 39–52)
PCO2 BLDV: 50 MMHG — SIGNIFICANT CHANGE UP (ref 39–52)
PH BLDV: 7.25 — LOW (ref 7.32–7.43)
PH BLDV: 7.29 — LOW (ref 7.32–7.43)
PH BLDV: 7.31 — LOW (ref 7.32–7.43)
PLATELET # BLD AUTO: 64 K/UL — LOW (ref 150–400)
PO2 BLDV: 23 MMHG — LOW (ref 25–45)
PO2 BLDV: 24 MMHG — LOW (ref 25–45)
PO2 BLDV: 35 MMHG — SIGNIFICANT CHANGE UP (ref 25–45)
POTASSIUM BLDV-SCNC: 2.6 MMOL/L — CRITICAL LOW (ref 3.5–5.1)
POTASSIUM BLDV-SCNC: 3.2 MMOL/L — LOW (ref 3.5–5.1)
POTASSIUM BLDV-SCNC: 4.3 MMOL/L — SIGNIFICANT CHANGE UP (ref 3.5–5.1)
POTASSIUM SERPL-MCNC: 2.6 MMOL/L — CRITICAL LOW (ref 3.5–5.3)
POTASSIUM SERPL-MCNC: 4.2 MMOL/L — SIGNIFICANT CHANGE UP (ref 3.5–5.3)
POTASSIUM SERPL-SCNC: 2.6 MMOL/L — CRITICAL LOW (ref 3.5–5.3)
POTASSIUM SERPL-SCNC: 4.2 MMOL/L — SIGNIFICANT CHANGE UP (ref 3.5–5.3)
PROT SERPL-MCNC: 5.3 G/DL — LOW (ref 6–8.3)
RBC # BLD: 3.7 M/UL — LOW (ref 3.8–5.2)
RBC # FLD: 15 % — HIGH (ref 10.3–14.5)
SAO2 % BLDV: 30.5 % — LOW (ref 67–88)
SAO2 % BLDV: 32.9 % — LOW (ref 67–88)
SAO2 % BLDV: 58.8 % — LOW (ref 67–88)
SODIUM SERPL-SCNC: 137 MMOL/L — SIGNIFICANT CHANGE UP (ref 135–145)
SODIUM SERPL-SCNC: 138 MMOL/L — SIGNIFICANT CHANGE UP (ref 135–145)
SPECIMEN SOURCE: SIGNIFICANT CHANGE UP
SPECIMEN SOURCE: SIGNIFICANT CHANGE UP
WBC # BLD: 20.45 K/UL — HIGH (ref 3.8–10.5)
WBC # FLD AUTO: 20.45 K/UL — HIGH (ref 3.8–10.5)

## 2024-04-15 PROCEDURE — 99223 1ST HOSP IP/OBS HIGH 75: CPT | Mod: GC

## 2024-04-15 PROCEDURE — 93010 ELECTROCARDIOGRAM REPORT: CPT

## 2024-04-15 PROCEDURE — 71045 X-RAY EXAM CHEST 1 VIEW: CPT | Mod: 26

## 2024-04-15 PROCEDURE — 99232 SBSQ HOSP IP/OBS MODERATE 35: CPT | Mod: GC

## 2024-04-15 RX ORDER — CEFTRIAXONE 500 MG/1
2000 INJECTION, POWDER, FOR SOLUTION INTRAMUSCULAR; INTRAVENOUS ONCE
Refills: 0 | Status: COMPLETED | OUTPATIENT
Start: 2024-04-15 | End: 2024-04-15

## 2024-04-15 RX ORDER — SODIUM CHLORIDE 9 MG/ML
1000 INJECTION, SOLUTION INTRAVENOUS
Refills: 0 | Status: DISCONTINUED | OUTPATIENT
Start: 2024-04-15 | End: 2024-04-18

## 2024-04-15 RX ORDER — HYDROCHLOROTHIAZIDE 25 MG
1 TABLET ORAL
Refills: 0 | DISCHARGE

## 2024-04-15 RX ORDER — DEXTROSE 10 % IN WATER 10 %
125 INTRAVENOUS SOLUTION INTRAVENOUS ONCE
Refills: 0 | Status: DISCONTINUED | OUTPATIENT
Start: 2024-04-15 | End: 2024-04-18

## 2024-04-15 RX ORDER — SODIUM,POTASSIUM PHOSPHATES 278-250MG
1 POWDER IN PACKET (EA) ORAL ONCE
Refills: 0 | Status: COMPLETED | OUTPATIENT
Start: 2024-04-15 | End: 2024-04-15

## 2024-04-15 RX ORDER — IPRATROPIUM/ALBUTEROL SULFATE 18-103MCG
3 AEROSOL WITH ADAPTER (GRAM) INHALATION ONCE
Refills: 0 | Status: COMPLETED | OUTPATIENT
Start: 2024-04-15 | End: 2024-04-15

## 2024-04-15 RX ORDER — ALENDRONATE SODIUM 70 MG/1
1 TABLET ORAL
Refills: 0 | DISCHARGE

## 2024-04-15 RX ORDER — HEPARIN SODIUM 5000 [USP'U]/ML
5000 INJECTION INTRAVENOUS; SUBCUTANEOUS EVERY 8 HOURS
Refills: 0 | Status: DISCONTINUED | OUTPATIENT
Start: 2024-04-15 | End: 2024-04-17

## 2024-04-15 RX ORDER — METOPROLOL TARTRATE 50 MG
1 TABLET ORAL
Refills: 0 | DISCHARGE

## 2024-04-15 RX ORDER — NIFEDIPINE 30 MG
1 TABLET, EXTENDED RELEASE 24 HR ORAL
Refills: 0 | DISCHARGE

## 2024-04-15 RX ORDER — PIPERACILLIN AND TAZOBACTAM 4; .5 G/20ML; G/20ML
3.38 INJECTION, POWDER, LYOPHILIZED, FOR SOLUTION INTRAVENOUS EVERY 12 HOURS
Refills: 0 | Status: DISCONTINUED | OUTPATIENT
Start: 2024-04-15 | End: 2024-04-15

## 2024-04-15 RX ORDER — ICOSAPENT ETHYL 500 MG/1
2 CAPSULE, LIQUID FILLED ORAL
Refills: 0 | DISCHARGE

## 2024-04-15 RX ORDER — DEXTROSE 50 % IN WATER 50 %
15 SYRINGE (ML) INTRAVENOUS ONCE
Refills: 0 | Status: DISCONTINUED | OUTPATIENT
Start: 2024-04-15 | End: 2024-04-18

## 2024-04-15 RX ORDER — LINAGLIPTIN 5 MG/1
1 TABLET, FILM COATED ORAL
Refills: 0 | DISCHARGE

## 2024-04-15 RX ORDER — POTASSIUM CHLORIDE 20 MEQ
10 PACKET (EA) ORAL
Refills: 0 | Status: COMPLETED | OUTPATIENT
Start: 2024-04-15 | End: 2024-04-15

## 2024-04-15 RX ORDER — CALCITRIOL 0.5 UG/1
1 CAPSULE ORAL
Refills: 0 | DISCHARGE

## 2024-04-15 RX ORDER — INSULIN LISPRO 100/ML
VIAL (ML) SUBCUTANEOUS AT BEDTIME
Refills: 0 | Status: DISCONTINUED | OUTPATIENT
Start: 2024-04-15 | End: 2024-04-18

## 2024-04-15 RX ORDER — PIPERACILLIN AND TAZOBACTAM 4; .5 G/20ML; G/20ML
3.38 INJECTION, POWDER, LYOPHILIZED, FOR SOLUTION INTRAVENOUS EVERY 8 HOURS
Refills: 0 | Status: DISCONTINUED | OUTPATIENT
Start: 2024-04-15 | End: 2024-04-15

## 2024-04-15 RX ORDER — LOSARTAN POTASSIUM 100 MG/1
1 TABLET, FILM COATED ORAL
Refills: 0 | DISCHARGE

## 2024-04-15 RX ORDER — SEMAGLUTIDE 0.68 MG/ML
0.5 INJECTION, SOLUTION SUBCUTANEOUS
Refills: 0 | DISCHARGE

## 2024-04-15 RX ORDER — DEXTROSE 50 % IN WATER 50 %
25 SYRINGE (ML) INTRAVENOUS ONCE
Refills: 0 | Status: DISCONTINUED | OUTPATIENT
Start: 2024-04-15 | End: 2024-04-18

## 2024-04-15 RX ORDER — FUROSEMIDE 40 MG
20 TABLET ORAL ONCE
Refills: 0 | Status: COMPLETED | OUTPATIENT
Start: 2024-04-15 | End: 2024-04-15

## 2024-04-15 RX ORDER — MAGNESIUM SULFATE 500 MG/ML
1 VIAL (ML) INJECTION ONCE
Refills: 0 | Status: COMPLETED | OUTPATIENT
Start: 2024-04-15 | End: 2024-04-15

## 2024-04-15 RX ORDER — POTASSIUM CHLORIDE 20 MEQ
40 PACKET (EA) ORAL ONCE
Refills: 0 | Status: COMPLETED | OUTPATIENT
Start: 2024-04-15 | End: 2024-04-15

## 2024-04-15 RX ORDER — LINACLOTIDE 145 UG/1
1 CAPSULE, GELATIN COATED ORAL
Refills: 0 | DISCHARGE

## 2024-04-15 RX ORDER — GLUCAGON INJECTION, SOLUTION 0.5 MG/.1ML
1 INJECTION, SOLUTION SUBCUTANEOUS ONCE
Refills: 0 | Status: DISCONTINUED | OUTPATIENT
Start: 2024-04-15 | End: 2024-04-18

## 2024-04-15 RX ORDER — DEXTROSE 50 % IN WATER 50 %
12.5 SYRINGE (ML) INTRAVENOUS ONCE
Refills: 0 | Status: DISCONTINUED | OUTPATIENT
Start: 2024-04-15 | End: 2024-04-18

## 2024-04-15 RX ORDER — IPRATROPIUM/ALBUTEROL SULFATE 18-103MCG
3 AEROSOL WITH ADAPTER (GRAM) INHALATION EVERY 6 HOURS
Refills: 0 | Status: COMPLETED | OUTPATIENT
Start: 2024-04-15 | End: 2024-04-16

## 2024-04-15 RX ORDER — ATORVASTATIN CALCIUM 80 MG/1
10 TABLET, FILM COATED ORAL AT BEDTIME
Refills: 0 | Status: DISCONTINUED | OUTPATIENT
Start: 2024-04-15 | End: 2024-04-18

## 2024-04-15 RX ORDER — ATORVASTATIN CALCIUM 80 MG/1
1 TABLET, FILM COATED ORAL
Refills: 0 | DISCHARGE

## 2024-04-15 RX ORDER — INSULIN LISPRO 100/ML
VIAL (ML) SUBCUTANEOUS
Refills: 0 | Status: DISCONTINUED | OUTPATIENT
Start: 2024-04-15 | End: 2024-04-18

## 2024-04-15 RX ORDER — CEFTRIAXONE 500 MG/1
INJECTION, POWDER, FOR SOLUTION INTRAMUSCULAR; INTRAVENOUS
Refills: 0 | Status: DISCONTINUED | OUTPATIENT
Start: 2024-04-15 | End: 2024-04-18

## 2024-04-15 RX ORDER — CLOPIDOGREL BISULFATE 75 MG/1
1 TABLET, FILM COATED ORAL
Refills: 0 | DISCHARGE

## 2024-04-15 RX ORDER — CHLORHEXIDINE GLUCONATE 213 G/1000ML
1 SOLUTION TOPICAL DAILY
Refills: 0 | Status: DISCONTINUED | OUTPATIENT
Start: 2024-04-15 | End: 2024-04-18

## 2024-04-15 RX ORDER — CLOPIDOGREL BISULFATE 75 MG/1
75 TABLET, FILM COATED ORAL DAILY
Refills: 0 | Status: DISCONTINUED | OUTPATIENT
Start: 2024-04-15 | End: 2024-04-18

## 2024-04-15 RX ORDER — CEFTRIAXONE 500 MG/1
2000 INJECTION, POWDER, FOR SOLUTION INTRAMUSCULAR; INTRAVENOUS EVERY 24 HOURS
Refills: 0 | Status: DISCONTINUED | OUTPATIENT
Start: 2024-04-16 | End: 2024-04-18

## 2024-04-15 RX ORDER — ISOSORBIDE MONONITRATE 60 MG/1
1 TABLET, EXTENDED RELEASE ORAL
Refills: 0 | DISCHARGE

## 2024-04-15 RX ADMIN — ATORVASTATIN CALCIUM 10 MILLIGRAM(S): 80 TABLET, FILM COATED ORAL at 22:39

## 2024-04-15 RX ADMIN — CLOPIDOGREL BISULFATE 75 MILLIGRAM(S): 75 TABLET, FILM COATED ORAL at 12:37

## 2024-04-15 RX ADMIN — HEPARIN SODIUM 5000 UNIT(S): 5000 INJECTION INTRAVENOUS; SUBCUTANEOUS at 05:09

## 2024-04-15 RX ADMIN — Medication 3 MILLILITER(S): at 13:53

## 2024-04-15 RX ADMIN — Medication 100 MILLIEQUIVALENT(S): at 00:02

## 2024-04-15 RX ADMIN — Medication 100 MILLIEQUIVALENT(S): at 02:24

## 2024-04-15 RX ADMIN — Medication 3 MILLILITER(S): at 22:10

## 2024-04-15 RX ADMIN — SODIUM CHLORIDE 100 MILLILITER(S): 9 INJECTION, SOLUTION INTRAVENOUS at 10:20

## 2024-04-15 RX ADMIN — Medication 100 MILLIEQUIVALENT(S): at 12:36

## 2024-04-15 RX ADMIN — CEFTRIAXONE 2000 MILLIGRAM(S): 500 INJECTION, POWDER, FOR SOLUTION INTRAMUSCULAR; INTRAVENOUS at 03:28

## 2024-04-15 RX ADMIN — Medication 100 GRAM(S): at 05:09

## 2024-04-15 RX ADMIN — HEPARIN SODIUM 5000 UNIT(S): 5000 INJECTION INTRAVENOUS; SUBCUTANEOUS at 22:39

## 2024-04-15 RX ADMIN — Medication 100 MILLIEQUIVALENT(S): at 01:02

## 2024-04-15 RX ADMIN — Medication 100 MILLIEQUIVALENT(S): at 07:57

## 2024-04-15 RX ADMIN — Medication 40 MILLIEQUIVALENT(S): at 07:57

## 2024-04-15 RX ADMIN — SODIUM CHLORIDE 150 MILLILITER(S): 9 INJECTION, SOLUTION INTRAVENOUS at 09:37

## 2024-04-15 RX ADMIN — Medication 20 MILLIGRAM(S): at 19:01

## 2024-04-15 RX ADMIN — Medication 100 MILLIEQUIVALENT(S): at 09:37

## 2024-04-15 RX ADMIN — Medication 1 PACKET(S): at 01:11

## 2024-04-15 RX ADMIN — HEPARIN SODIUM 5000 UNIT(S): 5000 INJECTION INTRAVENOUS; SUBCUTANEOUS at 14:14

## 2024-04-15 NOTE — PROGRESS NOTE ADULT - PROBLEM SELECTOR PLAN 4
Potassium 2.9-->2.7-->2.5 in ED despite repletion  - has now received 10meq iv K x6 doses and one packet Kphos  - possibly in setting of HCTZ (K-wasting) and initial contraction alkalosis    Plan:  - trend K and aggressively replete  - likely hold HCTZ on discharge - possibly in setting of HCTZ (K-wasting) and initial contraction alkalosis    Plan:  - trend K and aggressively replete  - likely hold HCTZ on discharge

## 2024-04-15 NOTE — H&P ADULT - PROBLEM SELECTOR PLAN 1
Tmax 104.5, BPlow 78/48, Lactate 2.9-->3.7-->2.6, UA positive, CT with evidence of pyelonephritis  History of recurrent UTIs  On Jardiance at home, which raises risk of UTI  MICU consulted for hypotension however BP recovered after isotonic fluids     Plan:  - f/u Ucx, Bcx  - continue with zosyn for now given several UTIs--> consider narrowing to ceftriaxone once cultures speciate  - DC Jardiance on discharge  - continue maintenance fluids for BP support Tmax 104.5, BPlow 78/48, Lactate 2.9-->3.7-->2.6, UA positive, CT with evidence of pyelonephritis  History of recurrent UTIs  On Jardiance at home, which raises risk of UTI  MICU consulted for hypotension however BP recovered after isotonic fluids     Plan:  - f/u Ucx, Bcx  - treat with ceftriaxone --> Bcx with E coli   - DC Jardiance on discharge  - continue maintenance fluids for BP support Pt met SIRS criteria with T 104.5F, bands 25.6%. Likely with sepsis 2/2 R pyelonephritis. UA positive, CTAP with evidence of R pyelonephritis.  BP low 78/48, Lactate 2.9-->3.7-->2.6.   History of recurrent UTIs.  On Jardiance at home, which raises risk of UTI.  MICU consulted for hypotension, however BP recovered after isotonic fluids.    Plan:  - S/p IV Vanc and Zosyn in the ED. C/w IV ceftriaxone 2 g q24hrs (blood cxs positive for GNR)  - F/u urine cx and blood cxs  - S/p 4L bolus of IVF. C/w LR at 100 cc/hr for now.  - Trend lactate until normalization  - Monitor VS q4hrs Pt met SIRS criteria with T 104.5F, bands 25.6%. Likely with sepsis 2/2 R pyelonephritis. UA positive, CTAP with evidence of R pyelonephritis.  BP low 78/48, Lactate 2.9-->3.7-->2.6.   History of recurrent UTIs.  On Jardiance at home, which raises risk of UTI.  MICU consulted for hypotension, however BP recovered after isotonic fluids.    Plan:  - S/p IV Vanc and Zosyn in the ED. C/w IV ceftriaxone 2 g q24hrs (blood cxs positive for GNR)  - F/u urine cx and blood cxs  - Repeat blood cxs on 4/16/24 AM  - S/p 4L bolus of IVF. C/w LR at 100 cc/hr for now.  - Trend lactate until normalization  - Monitor VS q4hrs

## 2024-04-15 NOTE — PROGRESS NOTE ADULT - SUBJECTIVE AND OBJECTIVE BOX
***************************************************************  Terry Villasenor, PGY1  Internal Medicine   Preferred contact via Microsoft Teams   ***************************************************************    CHENG KIM  83y  MRN: [id number]    Patient is a 83y old  Female who presents with a chief complaint of sepsis (15 Apr 2024 00:56)      Interval/Overnight Events: no events ON.     Subjective: Pt seen and examined at bedside. Denies fever, CP, SOB, abn pain, N/V, dysuria. Tolerating diet.      MEDICATIONS  (STANDING):  atorvastatin 10 milliGRAM(s) Oral at bedtime  cefTRIAXone   IVPB      clopidogrel Tablet 75 milliGRAM(s) Oral daily  heparin   Injectable 5000 Unit(s) SubCutaneous every 8 hours  lactated ringers. 1000 milliLiter(s) (150 mL/Hr) IV Continuous <Continuous>    MEDICATIONS  (PRN):      Objective:    Vitals: Vital Signs Last 24 Hrs  T(C): 36.7 (04-15-24 @ 01:45), Max: 40.3 (04-14-24 @ 13:28)  T(F): 98.1 (04-15-24 @ 01:45), Max: 104.5 (04-14-24 @ 13:28)  HR: 89 (04-15-24 @ 06:12) (71 - 102)  BP: 129/58 (04-15-24 @ 06:12) (78/48 - 160/75)  BP(mean): 71 (04-14-24 @ 21:45) (59 - 71)  RR: 20 (04-15-24 @ 06:12) (16 - 26)  SpO2: 98% (04-15-24 @ 06:12) (95% - 100%)                I&O's Summary      PHYSICAL EXAM:  GENERAL: NAD  HEAD:  Atraumatic, Normocephalic  EYES: EOMI, conjunctiva and sclera clear  CHEST/LUNG: Clear to auscultation bilaterally; No rales, rhonchi, wheezing, or rubs  HEART: Regular rate and rhythm; No murmurs, rubs, or gallops  ABDOMEN: Soft, Nontender, Nondistended;   SKIN: No rashes or lesions  NERVOUS SYSTEM:  Alert & Oriented X3, no focal deficits    LABS:                        12.5   8.60  )-----------( 112      ( 14 Apr 2024 13:52 )             34.9     04-14    137  |  104  |  37<H>  ----------------------------<  127<H>  2.7<LL>   |  19<L>  |  1.59<H>  04-14    137  |  98  |  43<H>  ----------------------------<  232<H>  2.9<LL>   |  22  |  1.48<H>    Ca    7.9<L>      14 Apr 2024 23:05  Ca    9.8      14 Apr 2024 13:52  Phos  2.2     04-14  Mg     1.60     04-14    TPro  4.8<L>  /  Alb  2.6<L>  /  TBili  0.4  /  DBili  x   /  AST  43<H>  /  ALT  16  /  AlkPhos  44  04-14  TPro  7.1  /  Alb  3.9  /  TBili  0.5  /  DBili  x   /  AST  35<H>  /  ALT  13  /  AlkPhos  85  04-14    CAPILLARY BLOOD GLUCOSE      POCT Blood Glucose.: 170 mg/dL (14 Apr 2024 17:26)  POCT Blood Glucose.: 206 mg/dL (14 Apr 2024 11:44)    PT/INR - ( 14 Apr 2024 13:52 )   PT: 11.0 sec;   INR: 0.97 ratio         PTT - ( 14 Apr 2024 13:52 )  PTT:28.8 sec    Urinalysis Basic - ( 14 Apr 2024 23:05 )    Color: x / Appearance: x / SG: x / pH: x  Gluc: 127 mg/dL / Ketone: x  / Bili: x / Urobili: x   Blood: x / Protein: x / Nitrite: x   Leuk Esterase: x / RBC: x / WBC x   Sq Epi: x / Non Sq Epi: x / Bacteria: x          RADIOLOGY & ADDITIONAL TESTS:    Imaging Personally Reviewed:  [x ] YES  [ ] NO    Consultants involved in case:   Consultant(s) Notes Reviewed:  [ x] YES  [ ] NO:   Care Discussed with Consultants/Other Providers [x ] YES  [ ] NO         ***************************************************************  Terry Villasenor, PGY1  Internal Medicine   Preferred contact via [employer] Teams   ***************************************************************    CHENG KIM  83y  MRN: [id number]    Patient is a 83y old  Female who presents with a chief complaint of sepsis (15 Apr 2024 00:56)      Interval/Overnight Events: K+ repleted overnight     Subjective: Pt pleasant resting calmly.     MEDICATIONS  (STANDING):  atorvastatin 10 milliGRAM(s) Oral at bedtime  cefTRIAXone   IVPB      clopidogrel Tablet 75 milliGRAM(s) Oral daily  heparin   Injectable 5000 Unit(s) SubCutaneous every 8 hours  lactated ringers. 1000 milliLiter(s) (150 mL/Hr) IV Continuous <Continuous>    MEDICATIONS  (PRN):      Objective:    Vitals: Vital Signs Last 24 Hrs  T(C): 36.7 (04-15-24 @ 01:45), Max: 40.3 (04-14-24 @ 13:28)  T(F): 98.1 (04-15-24 @ 01:45), Max: 104.5 (04-14-24 @ 13:28)  HR: 89 (04-15-24 @ 06:12) (71 - 102)  BP: 129/58 (04-15-24 @ 06:12) (78/48 - 160/75)  BP(mean): 71 (04-14-24 @ 21:45) (59 - 71)  RR: 20 (04-15-24 @ 06:12) (16 - 26)  SpO2: 98% (04-15-24 @ 06:12) (95% - 100%)                I&O's Summary      PHYSICAL EXAM:  EYES: EOMI, conjunctiva and sclera clear  CHEST/LUNG: Clear to auscultation bilaterally; No rales, rhonchi, wheezing, or rubs  HEART: Regular rate and rhythm; No murmurs, rubs, or gallops  ABDOMEN: Right sided CVA tender noted. Soft non tender abdomen   SKIN: No rashes or lesions  NERVOUS SYSTEM:  Alert & Oriented X3, no focal deficits    LABS:                        12.5   8.60  )-----------( 112      ( 14 Apr 2024 13:52 )             34.9     04-14    137  |  104  |  37<H>  ----------------------------<  127<H>  2.7<LL>   |  19<L>  |  1.59<H>  04-14    137  |  98  |  43<H>  ----------------------------<  232<H>  2.9<LL>   |  22  |  1.48<H>    Ca    7.9<L>      14 Apr 2024 23:05  Ca    9.8      14 Apr 2024 13:52  Phos  2.2     04-14  Mg     1.60     04-14    TPro  4.8<L>  /  Alb  2.6<L>  /  TBili  0.4  /  DBili  x   /  AST  43<H>  /  ALT  16  /  AlkPhos  44  04-14  TPro  7.1  /  Alb  3.9  /  TBili  0.5  /  DBili  x   /  AST  35<H>  /  ALT  13  /  AlkPhos  85  04-14    CAPILLARY BLOOD GLUCOSE      POCT Blood Glucose.: 170 mg/dL (14 Apr 2024 17:26)  POCT Blood Glucose.: 206 mg/dL (14 Apr 2024 11:44)    PT/INR - ( 14 Apr 2024 13:52 )   PT: 11.0 sec;   INR: 0.97 ratio         PTT - ( 14 Apr 2024 13:52 )  PTT:28.8 sec    Urinalysis Basic - ( 14 Apr 2024 23:05 )    Color: x / Appearance: x / SG: x / pH: x  Gluc: 127 mg/dL / Ketone: x  / Bili: x / Urobili: x   Blood: x / Protein: x / Nitrite: x   Leuk Esterase: x / RBC: x / WBC x   Sq Epi: x / Non Sq Epi: x / Bacteria: x          RADIOLOGY & ADDITIONAL TESTS:    Imaging Personally Reviewed:  [x ] YES  [ ] NO    Consultants involved in case:   Consultant(s) Notes Reviewed:  [ x] YES  [ ] NO:   Care Discussed with Consultants/Other Providers [x ] YES  [ ] NO

## 2024-04-15 NOTE — PROGRESS NOTE ADULT - PROBLEM SELECTOR PLAN 5
Home regimen: tradjenta 5mg qd, jardiance 10mg qd, ozempic (only took one dose - switched from glipizide 5mg last week)    Plan:  - Hold all while inpatient  - Glucose appears well controlled right now; will start with low ISS and can uptitrate to basal-bolus after determining requirements  - stop jardiance on DC

## 2024-04-15 NOTE — H&P ADULT - NSHPREVIEWOFSYSTEMS_GEN_ALL_CORE
`Patient is a 64y old  Male who presents with a chief complaint of SHEYLA ON CKD, DEMAND ISCHEMIA, AMBULATORY DYSFUNCTION (06 Sep 2021 12:13)    PATIENT IS SEEN AND EXAMINED IN MEDICAL FLOOR.  DORIST [    ]    ALYSIA [   ]      GT [   ]    ALLERGIES:  No Known Allergies      Daily     Daily Weight in k (07 Sep 2021 08:40)    VITALS:    Vital Signs Last 24 Hrs  T(C): 36.4 (07 Sep 2021 05:17), Max: 37.4 (06 Sep 2021 21:07)  T(F): 97.6 (07 Sep 2021 05:17), Max: 99.3 (06 Sep 2021 21:07)  HR: 82 (07 Sep 2021 05:17) (77 - 82)  BP: 127/62 (07 Sep 2021 05:17) (121/58 - 127/62)  BP(mean): 77 (06 Sep 2021 21:07) (67 - 77)  RR: 18 (07 Sep 2021 05:17) (16 - 18)  SpO2: 94% (07 Sep 2021 05:17) (94% - 98%)    LABS:    CBC Full  -  ( 07 Sep 2021 08:30 )  WBC Count : 8.12 K/uL  RBC Count : 3.34 M/uL  Hemoglobin : 10.9 g/dL  Hematocrit : 34.3 %  Platelet Count - Automated : 189 K/uL  Mean Cell Volume : 102.7 fl  Mean Cell Hemoglobin : 32.6 pg  Mean Cell Hemoglobin Concentration : 31.8 gm/dL  Auto Neutrophil # : x  Auto Lymphocyte # : x  Auto Monocyte # : x  Auto Eosinophil # : x  Auto Basophil # : x  Auto Neutrophil % : x  Auto Lymphocyte % : x  Auto Monocyte % : x  Auto Eosinophil % : x  Auto Basophil % : x          143  |  107  |  72<H>  ----------------------------<  93  4.3   |  29  |  3.67<H>    Ca    9.5      07 Sep 2021 08:30  Phos  4.5     09  Mg     2.5         TPro  6.9  /  Alb  2.4<L>  /  TBili  0.3  /  DBili  x   /  AST  6<L>  /  ALT  10  /  AlkPhos  78      CAPILLARY BLOOD GLUCOSE      POCT Blood Glucose.: 107 mg/dL (07 Sep 2021 07:39)  POCT Blood Glucose.: 120 mg/dL (06 Sep 2021 21:22)  POCT Blood Glucose.: 129 mg/dL (06 Sep 2021 16:48)  POCT Blood Glucose.: 123 mg/dL (06 Sep 2021 11:25)        LIVER FUNCTIONS - ( 07 Sep 2021 08:30 )  Alb: 2.4 g/dL / Pro: 6.9 g/dL / ALK PHOS: 78 U/L / ALT: 10 U/L DA / AST: 6 U/L / GGT: x           Creatinine Trend: 3.67<--, 3.66<--, 3.54<--, 3.38<--, 3.65<--, 3.62<--  I&O's Summary    06 Sep 2021 07:01  -  07 Sep 2021 07:00  --------------------------------------------------------  IN: 0 mL / OUT: 4120 mL / NET: -4120 mL    07 Sep 2021 07:01  -  07 Sep 2021 10:11  --------------------------------------------------------  IN: 0 mL / OUT: 1000 mL / NET: -1000 mL            Clean Catch Clean Catch (Midstream)   @ 21:13   <10,000 CFU/mL Normal Urogenital Ivelisse  --  --          MEDICATIONS:    MEDICATIONS  (STANDING):  ALBUTerol    90 MICROgram(s) HFA Inhaler 2 Puff(s) Inhalation <User Schedule>  aspirin enteric coated 81 milliGRAM(s) Oral daily  budesonide 160 MICROgram(s)/formoterol 4.5 MICROgram(s) Inhaler 2 Puff(s) Inhalation two times a day  buPROPion XL (24-Hour) . 150 milliGRAM(s) Oral daily  busPIRone 10 milliGRAM(s) Oral two times a day  calcitriol   Capsule 0.25 MICROGram(s) Oral daily  chlorhexidine 2% Cloths 1 Application(s) Topical <User Schedule>  cholecalciferol 1000 Unit(s) Oral daily  diVALproex  milliGRAM(s) Oral <User Schedule>  ferrous    sulfate 325 milliGRAM(s) Oral daily  finasteride 5 milliGRAM(s) Oral daily  folic acid 1 milliGRAM(s) Oral daily  furosemide    Tablet 40 milliGRAM(s) Oral daily  heparin   Injectable 5000 Unit(s) SubCutaneous every 8 hours  levothyroxine 125 MICROGram(s) Oral daily  lidocaine   4% Patch 1 Patch Transdermal daily  mupirocin 2% Ointment 1 Application(s) Both Nostrils every 12 hours  nicotine -  14 mG/24Hr(s) Patch 1 patch Transdermal daily  pantoprazole    Tablet 40 milliGRAM(s) Oral before breakfast  polyethylene glycol 3350 17 Gram(s) Oral daily  risperiDONE   Tablet 2 milliGRAM(s) Oral <User Schedule>  senna 2 Tablet(s) Oral at bedtime  sevelamer carbonate 800 milliGRAM(s) Oral three times a day with meals  simvastatin 20 milliGRAM(s) Oral at bedtime  tamsulosin 0.8 milliGRAM(s) Oral at bedtime      MEDICATIONS  (PRN):  acetaminophen   Tablet .. 1000 milliGRAM(s) Oral every 8 hours PRN Moderate Pain (4 - 6)  artificial  tears Solution 1 Drop(s) Right EYE every 6 hours PRN Dry Eyes      REVIEW OF SYSTEMS:                           ALL ROS DONE [ X   ]    CONSTITUTIONAL:  LETHARGIC [   ], FEVER [   ], UNRESPONSIVE [   ]  CVS:  CP  [   ], SOB, [   ], PALPITATIONS [   ], DIZZYNESS [   ]  RS: COUGH [   ], SPUTUM [   ]  GI: ABDOMINAL PAIN [   ], NAUSEA [   ], VOMITINGS [   ], DIARRHEA [   ], CONSTIPATION [   ]  :  DYSURIA [   ], NOCTURIA [   ], INCREASED FREQUENCY [   ], DRIBLING [   ],  SKELETAL: PAINFUL JOINTS [   ], SWOLLEN JOINTS [   ], NECK ACHE [   ], LOW BACK ACHE [   ],  SKIN : ULCERS [   ], RASH [   ], ITCHING [   ]  CNS: HEAD ACHE [   ], DOUBLE VISION [   ], BLURRED VISION [   ], AMS / CONFUSION [   ], SEIZURES [   ], WEAKNESS [   ],TINGLING / NUMBNESS [   ]    PHYSICAL EXAMINATION:  GENERAL APPEARANCE: NO DISTRESS  HEENT:  NO PALLOR, NO  JVD,  NO   NODES, NECK SUPPLE  CVS: S1 +, S2 +,   RS: AEEB,  OCCASIONAL  RALES +,   NO RONCHI  ABD: SOFT, NT, NO, BS +  EXT: PE ++  SKIN: WARM,   SKELETAL:  ROM ACCEPTABLE  CNS:  AAO X 2     RADIOLOGY :    RADIOLOGY REVIEWED    ASSESSMENT :     Weakness    No pertinent past medical history    Hypothyroid    Hyperlipidemia    Schizophrenia    Schizoaffective disorder    Ambulatory dysfunction    Anemia    History of BPH    CKD (chronic kidney disease)    Chronic obstructive pulmonary disease (COPD)    Bipolar disorder    Bipolar disorder    No significant past surgical history        PLAN:  HPI:  64 year old man with morbid obesity from [facility] [place] ambulates w/ walker has past medical hx of COPD not on O2 at home, hypothyroidism, anemia, left DVT, CKD, BPH, HLD, bipolar disorder, ambulatory dysfunction, lymphedema was BIBEMS as patient was complaining of worsening lower extremity weakness, lightheaded and overall not feeling well for one day. Of note, patient with slurred speech at baseline since childhood and known ambulatory dysfunction reason why he is on nursing home. Patient denies chest pain, palpitations, sob or other symptoms.      [facility] full code    (30 Aug 2021 17:02)    # PT EVAL RECOMMENDED TG - D/C PLAN TO [facility]    # RRT FOR AMS [] FOUND TO HAVE ACUTE HYPERCAPNIA - GIVEN NARCAN [PATIENT'S RESPONSIVENESS IMPROVED], NARCOTICS DISCONTINUED, TRANSFERRED TO ICU ON BIPAP  - PAIN MGMT CONSULT  # HYPOGLYCEMIA S/T POOR PO INTAKE - RESOLVED  # ELEVATED TROPONIN - TREND, ECHOCARDIOGRAM - W/ MILD PULM HTN, CARDIOLOGY CONSULT  # SHEYLA ON CKD - IMPROVING - FAILED TOV W/ HATFIELD - MONITOR CR, AVOID NEPHROTOXIC AGENTS - ? ATN  - MONITOR IS AND OS  - INCREASED FLOMAX, CONTINUE FINASTERIDE  - HOLD DIURETICS  - NEPHROLOGY CONSULT  # COPD  # HYPOTHYROIDISM  # MORBID OBESITY  # CHRONIC VENOUS INSUFFICIENCY, HX OF LEFT DVT    # GI AND DVT PPX   `Patient is a 64y old  Male who presents with a chief complaint of SHEYLA ON CKD, DEMAND ISCHEMIA, AMBULATORY DYSFUNCTION (06 Sep 2021 12:13)    PATIENT IS SEEN AND EXAMINED IN MEDICAL FLOOR.  DORIST [    ]    ALYSIA [   ]      GT [   ]    ALLERGIES:  No Known Allergies      Daily     Daily Weight in k (07 Sep 2021 08:40)    VITALS:    Vital Signs Last 24 Hrs  T(C): 36.4 (07 Sep 2021 05:17), Max: 37.4 (06 Sep 2021 21:07)  T(F): 97.6 (07 Sep 2021 05:17), Max: 99.3 (06 Sep 2021 21:07)  HR: 82 (07 Sep 2021 05:17) (77 - 82)  BP: 127/62 (07 Sep 2021 05:17) (121/58 - 127/62)  BP(mean): 77 (06 Sep 2021 21:07) (67 - 77)  RR: 18 (07 Sep 2021 05:17) (16 - 18)  SpO2: 94% (07 Sep 2021 05:17) (94% - 98%)    LABS:    CBC Full  -  ( 07 Sep 2021 08:30 )  WBC Count : 8.12 K/uL  RBC Count : 3.34 M/uL  Hemoglobin : 10.9 g/dL  Hematocrit : 34.3 %  Platelet Count - Automated : 189 K/uL  Mean Cell Volume : 102.7 fl  Mean Cell Hemoglobin : 32.6 pg  Mean Cell Hemoglobin Concentration : 31.8 gm/dL  Auto Neutrophil # : x  Auto Lymphocyte # : x  Auto Monocyte # : x  Auto Eosinophil # : x  Auto Basophil # : x  Auto Neutrophil % : x  Auto Lymphocyte % : x  Auto Monocyte % : x  Auto Eosinophil % : x  Auto Basophil % : x          143  |  107  |  72<H>  ----------------------------<  93  4.3   |  29  |  3.67<H>    Ca    9.5      07 Sep 2021 08:30  Phos  4.5     09  Mg     2.5         TPro  6.9  /  Alb  2.4<L>  /  TBili  0.3  /  DBili  x   /  AST  6<L>  /  ALT  10  /  AlkPhos  78      CAPILLARY BLOOD GLUCOSE      POCT Blood Glucose.: 107 mg/dL (07 Sep 2021 07:39)  POCT Blood Glucose.: 120 mg/dL (06 Sep 2021 21:22)  POCT Blood Glucose.: 129 mg/dL (06 Sep 2021 16:48)  POCT Blood Glucose.: 123 mg/dL (06 Sep 2021 11:25)        LIVER FUNCTIONS - ( 07 Sep 2021 08:30 )  Alb: 2.4 g/dL / Pro: 6.9 g/dL / ALK PHOS: 78 U/L / ALT: 10 U/L DA / AST: 6 U/L / GGT: x           Creatinine Trend: 3.67<--, 3.66<--, 3.54<--, 3.38<--, 3.65<--, 3.62<--  I&O's Summary    06 Sep 2021 07:01  -  07 Sep 2021 07:00  --------------------------------------------------------  IN: 0 mL / OUT: 4120 mL / NET: -4120 mL    07 Sep 2021 07:01  -  07 Sep 2021 10:11  --------------------------------------------------------  IN: 0 mL / OUT: 1000 mL / NET: -1000 mL            Clean Catch Clean Catch (Midstream)   @ 21:13   <10,000 CFU/mL Normal Urogenital Ivelisse  --  --          MEDICATIONS:    MEDICATIONS  (STANDING):  ALBUTerol    90 MICROgram(s) HFA Inhaler 2 Puff(s) Inhalation <User Schedule>  aspirin enteric coated 81 milliGRAM(s) Oral daily  budesonide 160 MICROgram(s)/formoterol 4.5 MICROgram(s) Inhaler 2 Puff(s) Inhalation two times a day  buPROPion XL (24-Hour) . 150 milliGRAM(s) Oral daily  busPIRone 10 milliGRAM(s) Oral two times a day  calcitriol   Capsule 0.25 MICROGram(s) Oral daily  chlorhexidine 2% Cloths 1 Application(s) Topical <User Schedule>  cholecalciferol 1000 Unit(s) Oral daily  diVALproex  milliGRAM(s) Oral <User Schedule>  ferrous    sulfate 325 milliGRAM(s) Oral daily  finasteride 5 milliGRAM(s) Oral daily  folic acid 1 milliGRAM(s) Oral daily  furosemide    Tablet 40 milliGRAM(s) Oral daily  heparin   Injectable 5000 Unit(s) SubCutaneous every 8 hours  levothyroxine 125 MICROGram(s) Oral daily  lidocaine   4% Patch 1 Patch Transdermal daily  mupirocin 2% Ointment 1 Application(s) Both Nostrils every 12 hours  nicotine -  14 mG/24Hr(s) Patch 1 patch Transdermal daily  pantoprazole    Tablet 40 milliGRAM(s) Oral before breakfast  polyethylene glycol 3350 17 Gram(s) Oral daily  risperiDONE   Tablet 2 milliGRAM(s) Oral <User Schedule>  senna 2 Tablet(s) Oral at bedtime  sevelamer carbonate 800 milliGRAM(s) Oral three times a day with meals  simvastatin 20 milliGRAM(s) Oral at bedtime  tamsulosin 0.8 milliGRAM(s) Oral at bedtime      MEDICATIONS  (PRN):  acetaminophen   Tablet .. 1000 milliGRAM(s) Oral every 8 hours PRN Moderate Pain (4 - 6)  artificial  tears Solution 1 Drop(s) Right EYE every 6 hours PRN Dry Eyes      REVIEW OF SYSTEMS:                           ALL ROS DONE [ X   ]    CONSTITUTIONAL:  LETHARGIC [   ], FEVER [   ], UNRESPONSIVE [   ]  CVS:  CP  [   ], SOB, [   ], PALPITATIONS [   ], DIZZYNESS [   ]  RS: COUGH [   ], SPUTUM [   ]  GI: ABDOMINAL PAIN [   ], NAUSEA [   ], VOMITINGS [   ], DIARRHEA [   ], CONSTIPATION [   ]  :  DYSURIA [   ], NOCTURIA [   ], INCREASED FREQUENCY [   ], DRIBLING [   ],  SKELETAL: PAINFUL JOINTS [   ], SWOLLEN JOINTS [   ], NECK ACHE [   ], LOW BACK ACHE [   ],  SKIN : ULCERS [   ], RASH [   ], ITCHING [   ]  CNS: HEAD ACHE [   ], DOUBLE VISION [   ], BLURRED VISION [   ], AMS / CONFUSION [   ], SEIZURES [   ], WEAKNESS [   ],TINGLING / NUMBNESS [   ]    PHYSICAL EXAMINATION:  GENERAL APPEARANCE: NO DISTRESS  HEENT:  NO PALLOR, NO  JVD,  NO   NODES, NECK SUPPLE  CVS: S1 +, S2 +,   RS: AEEB,  OCCASIONAL  RALES +,   NO RONCHI  ABD: SOFT, NT, NO, BS +  EXT: PE ++  SKIN: WARM,   SKELETAL:  ROM ACCEPTABLE  CNS:  AAO X 2     RADIOLOGY :    RADIOLOGY REVIEWED    ASSESSMENT :     Weakness    No pertinent past medical history    Hypothyroid    Hyperlipidemia    Schizophrenia    Schizoaffective disorder    Ambulatory dysfunction    Anemia    History of BPH    CKD (chronic kidney disease)    Chronic obstructive pulmonary disease (COPD)    Bipolar disorder    Bipolar disorder    No significant past surgical history        PLAN:  HPI:  64 year old man with morbid obesity from [facility] ambulates w/ walker has past medical hx of COPD not on O2 at home, hypothyroidism, anemia, left DVT, CKD, BPH, HLD, bipolar disorder, ambulatory dysfunction, lymphedema was BIBEMS as patient was complaining of worsening lower extremity weakness, lightheaded and overall not feeling well for one day. Of note, patient with slurred speech at baseline since childhood and known ambulatory dysfunction reason why he is on [facility]. Patient denies chest pain, palpitations, sob or other symptoms.      [facility] full code    (30 Aug 2021 17:02)    # PT EVAL RECOMMENDED TG - D/C PLAN TO [facility] ; F/U PT RE-EVAL - IF HOME W/ HOME PT, PLAN FOR D/C TO [facility]    # RRT FOR AMS [] FOUND TO HAVE ACUTE HYPERCAPNIA - GIVEN NARCAN [PATIENT'S RESPONSIVENESS IMPROVED], NARCOTICS DISCONTINUED, TRANSFERRED TO ICU ON BIPAP  - PAIN MGMT CONSULT  # HYPOGLYCEMIA S/T POOR PO INTAKE - RESOLVED  # ELEVATED TROPONIN - TREND, ECHOCARDIOGRAM - W/ MILD PULM HTN, CARDIOLOGY CONSULT  # SHEYLA ON CKD - IMPROVING - PASSED TOV W/ HATFIELD [] - MONITOR CR, AVOID NEPHROTOXIC AGENTS - ? ATN  - MONITOR IS AND OS  - INCREASED FLOMAX, CONTINUE FINASTERIDE  - HOLD DIURETICS  - NEPHROLOGY CONSULT  # COPD  # HYPOTHYROIDISM  # MORBID OBESITY  # CHRONIC VENOUS INSUFFICIENCY, HX OF LEFT DVT    # GI AND DVT PPX   REVIEW OF SYSTEMS:  CONSTITUTIONAL: +weakness, chills  EYES/ENT: No visual changes;  No vertigo or throat pain   NECK: No pain or stiffness  RESPIRATORY: No cough, wheezing, hemoptysis; No shortness of breath  CARDIOVASCULAR: No chest pain or palpitations  GASTROINTESTINAL: No abdominal or epigastric pain. +vomiting x2 today; No diarrhea or constipation. No melena or hematochezia.  GENITOURINARY: No dysuria but +frequency  NEUROLOGICAL: No HA  SKIN: No itching, rashes Constitutional: +Generalized weakness and chills. No fever or weight loss.  Eyes: No visual changes, double vision, or eye pain  Ears, Nose, Mouth, Throat: No runny nose, sinus pain, ear pain, tinnitus, sore throat, dysphagia, or odynophagia  Cardiovascular: No chest pain, palpitations, or LE edema  Respiratory: No cough, wheezing, hemoptysis, or shortness of breath  Gastrointestinal: +Nausea and vomiting. No abdominal pain, diarrhea/constipation, hematemesis, melena, or BRBPR.  Genitourinary: Urinary frequency. No dysuria or hematuria.  Musculoskeletal: No neck pain or back pain. No joint pain, swelling, or decreased ROM.  Skin: No pruritus, rashes, lesions, or wounds  Neurologic: No syncope, seizures, headache, paresthesias, numbness, or limb weakness  Psychiatric: No depression, anxiety, difficulty concentrating, anhedonia, or lack of energy  Endocrine: No heat/cold intolerance, mood swings, sweats, polydipsia, or polyuria  Hematologic/lymphatic: No purpura, petechia, or prolonged or excessive bleeding after dental extraction / injury  Allergic/Immunologic: No anaphylaxis or allergic response to materials, foods, animals    Positives and pertinent negatives noted and all other systems negative.

## 2024-04-15 NOTE — PROGRESS NOTE ADULT - SUBJECTIVE AND OBJECTIVE BOX
Subjective  Since starting broad spectrum antibiotics patient has been afebrile and hemodynamically stable. Patient still endorsing some generalized pain but no flank pain. Patients Cr is 1.52 from 1.59.     Objective    Vital signs  T(F): , Max: 104.5 (04-14-24 @ 13:28)  HR: 89 (04-15-24 @ 06:12)  BP: 129/58 (04-15-24 @ 06:12)  SpO2: 98% (04-15-24 @ 06:12)  Wt(kg): --    Output       Gen: NAD  Abd: soft, nontender, nondistended  : R CVAT (mild), no L CVAT    Labs      04-14 @ 23:05    WBC --    / Hct --    / SCr 1.59     04-14 @ 13:52    WBC 8.60  / Hct 34.9  / SCr 1.48         Culture - Blood (collected 04-14-24 @ 13:40)  Source: .Blood Blood-Peripheral  Gram Stain (04-15-24 @ 01:06):    Growth in anaerobic bottle: Gram Negative Rods    Growth in aerobic bottle: Gram Negative Rods  Preliminary Report (04-15-24 @ 01:06):    Growth in anaerobic bottle: Gram Negative Rods    Growth in aerobic bottle: Gram Negative Rods    Culture - Blood (collected 04-14-24 @ 13:30)  Source: .Blood Blood-Peripheral  Gram Stain (04-15-24 @ 01:05):    Growth in aerobic bottle: Gram Negative Rods    Growth in anaerobic bottle: Gram Negative Rods  Preliminary Report (04-15-24 @ 01:05):    Growth in aerobic bottle: Gram Negative Rods    Growth in anaerobic bottle: Gram Negative Rods    Direct identification is available within approximately 3-5    hours either by Blood Panel Multiplexed PCR or Direct    MALDI-TOF. Details: [email or web address]  Organism: Blood Culture PCR (04-15-24 @ 02:01)  Organism: Blood Culture PCR (04-15-24 @ 02:01)      Method Type: PCR      -  Escherichia coli: Detec        Urine Cx: pending  Blood Cx: pending    Imaging

## 2024-04-15 NOTE — ED ADULT NURSE REASSESSMENT NOTE - NS ED NURSE REASSESS COMMENT FT1
Break RN: Pt resting in stretcher at this time, offers no complaints. Respirations equal and unlabored. Family remains at bedside. Pt normal sinus on cardiac monitor. IV patent. Medicated as per EMR orders. Pt admitted, pending inpatient bed assignment. No acute distress noted. Safety maintained, bed in lowest position, side rails raised, call bell in reach.
Mobile Critical Care RN: Pt received in rm 19 in ed, pt's daughter at bedside,, states pt is at usual level of mentation, pt alert, responds appropriately to questions. Pt ordered to start levophed gtt for low BP, MAP<65. Pt received with BP 76/48, MAP 59. Pt denies any symptoms of dizziness, lightheadedness. BP cuff replaced with smaller cuff as appropriate to pt size, repeat /53 with MAP of 69. Pt started on maintenance IV fluid LR at rate 150, monitored for another hour, BP maintained with MAP>65, see flow sheet. Levophed gtt not started. ED RN and ED MD informed of improved vitals, pt endorsed back to ED primary RN.
Report received from MANDY Berger. Pt is A&Ox3, mandarin speaking- prefers daughter at bedside to translate. Pt appears comfortable in stretcher, offers no new complaints at this time. breathing is even and unlabored. IV patent. IV fluids and potassium infusing. Plan of care ongoing. Stretcher set in lowest position, call bell within reach, safety maintained.
levo drip held d/t bp improving with fluids. PA aware. vitals as documented. ICU consult at bedside, daughter at bedside able to answer questions
pt had soft BM, pt cleaned and linens changed, new diaper and purewick in place. pt able to follow commands, RR even and unlabored. iv fluids infusing well without complications, iv site WNL. awaiting admitting bed placement. daughter at bedside
report received from previous ED RN. pt returned from ct scan in NAD, RR even and unlabored. vitals as documented, + hypotension noted, PA and MD made aware. pt arousable to verbal stimuli, able to speak in clear complete sentences to daughter at bedside, speech clear. maintained on cardiac monitor. second 20G iv placed to left AC, flushing well without complications, iv site WNL. critical care RN at bedside. safety measures maintained, side rails up x2
Patient's BP is low 89/46, Dr. Rome notified immediately and at the bedside for evaluation. Will given IV fluid as per MD's order.
Received patient in room 19, pending lab results, awaiting CT scan. Patient is on cardiac monitor sinus rhythm w/O2 sat 96% on a room air. Patient is A&Ox1, lethargic, airway patent, breathing unlabored and even, radial pulses palpable, abdomen soft, nontender. IV antibiotic and IV fluid bolus infusing. Side rails up and safety maintained. Fall precaution in place. Call bells within reach. Family at the bedside.

## 2024-04-15 NOTE — PROGRESS NOTE ADULT - PROBLEM SELECTOR PLAN 3
Home regimen: HCTZ 12.5mg qd, Imdur ER 30mg qd, metoprolol succinate 25mg qd, nifedipine ER 60mg qd, Losartan 50mg qd    Plan:  - Hold all in setting of sepsis/hypotension. Can restart as BPs tolerate  - If at baseline hypertension requires this regimen to control, should undergo secondary hypertension workup Home regimen: HCTZ 12.5mg qd, Imdur ER 30mg qd, metoprolol succinate 25mg qd, nifedipine ER 60mg qd, Losartan 50mg qd    Plan:  - Hold all in setting of sepsis/hypotension. Can restart as BPs tolerate

## 2024-04-15 NOTE — H&P ADULT - ATTENDING COMMENTS
82 yo woman with PMH of DM2, HTN, CVA w/ LT sided deficits, recurrent UTIs, who presents for hyperglycemia, with serum glucose >200. On labs, no evidence of DKA, including euglycemic DKA. Pt found to have sepsis 2/2 R-sided pyelonephritis with GNR bacteremia. Currently on ceftriaxone 2 g q24hrs. F/u blood cx sensitivities and urine cx. Repeat blood cxs on 4/16/24. Pt also with abrupt cutoff at mid R ureter. No stone visualized on CTAP noncontrast. Urology consulted, f/u recs. Unclear if pt with LAYLA vs. CKD. Monitor renal function and urine output.

## 2024-04-15 NOTE — PHARMACOTHERAPY INTERVENTION NOTE - COMMENTS
Recommended to de-escalate from piperacillin-tazobactam to ceftriaxone 2g q24h for E. coli bacteremia and UTI.       Yue Richard, PharmD   Clinical Pharmacy Specialist, Infectious Diseases  Tele-Antimicrobial Stewardship Program (Tele-ASP)  Tele-ASP Phone: [phone]

## 2024-04-15 NOTE — H&P ADULT - HISTORY OF PRESENT ILLNESS
Majority of history per daughter at bedside.  Pt is an 84 yo woman with PMH of DM2, HTN, CVA w/ LT sided deficits, recurrent UTIs, who presents for hyperglycemia. Last night and this morning, her blood glucose was >200 which is unusual for her. For a few days, pt had been complaining of urinary frequency, back pain, fatigue but no dysuria. Pt had 2 episodes of vomiting this morning, She had lab work done recently at PCP, resulted yesterday with elevated BUN/Creatinine. Denies dizziness, headache, chest pain, shortness of breath, dysuria or hematuria. Notes chronic left leg pain s/p CVA. Also with back pain, which meloxicam helped. Daughter notes patient intermittently has had edema of the left lower extremity but moving around helps. Pt was initially confused in the ED but improved back to baseline mental status.     ED Course: Tmax 104.5, BPlow 78/48.  Labs concerning for 25% bands, LAYLA, hypokalemia and elevated lactate. UA positive for bacteria, leukocyte esterase and nitrites. She was given 3+L of fluids with improvement of BP.  CT abdomen/pelvis with IV contrast showed R retroperitoneal stranding and an abrupt change in caliber of the R ureter. Urology consulted, felt patient likely has stricture vs. abnormal peristalsis of ureter iso pyelonephritis. S/p vanc/zosyn.    Majority of history per daughter at bedside.  Pt is an 84 yo woman with PMH of DM2, HTN, CVA w/ LT sided deficits, recurrent UTIs, who presents for hyperglycemia. Last night and this morning, her blood glucose was >200 which is unusual for her. For a few days, pt had been complaining of urinary frequency, R lower back/flank pain, fatigue but no dysuria. Pt had 2 episodes of vomiting this morning, She had lab work done recently at PCP, resulted yesterday with elevated BUN/Creatinine. Denies dizziness, headache, chest pain, shortness of breath, dysuria or hematuria. Notes chronic left leg pain s/p CVA. Also with back pain, which meloxicam helped. Daughter notes patient intermittently has had edema of the left lower extremity but moving around helps. Pt was initially confused in the ED but improved back to baseline mental status.     ED Course: Tmax 104.5, BPlow 78/48.  Labs concerning for 25% bands, LAYLA, hypokalemia and elevated lactate. UA positive for bacteria, leukocyte esterase and nitrites. She was given 3+L of fluids with improvement of BP.  CT abdomen/pelvis with IV contrast showed R retroperitoneal stranding and an abrupt change in caliber of the R ureter. Urology consulted, felt patient likely has stricture vs. abnormal peristalsis of ureter iso pyelonephritis. S/p vanc/zosyn.

## 2024-04-15 NOTE — H&P ADULT - NSHPLABSRESULTS_GEN_ALL_CORE
12.5   8.60  )-----------( 112      ( 14 Apr 2024 13:52 )             34.9     04-14    137  |  104  |  37<H>  ----------------------------<  127<H>  2.7<LL>   |  19<L>  |  1.59<H>    Ca    7.9<L>      14 Apr 2024 23:05  Phos  2.2     04-14  Mg     1.60     04-14    TPro  4.8<L>  /  Alb  2.6<L>  /  TBili  0.4  /  DBili  x   /  AST  43<H>  /  ALT  16  /  AlkPhos  44  04-14        LIVER FUNCTIONS - ( 14 Apr 2024 23:05 )  Alb: 2.6 g/dL / Pro: 4.8 g/dL / ALK PHOS: 44 U/L / ALT: 16 U/L / AST: 43 U/L / GGT: x           PT/INR - ( 14 Apr 2024 13:52 )   PT: 11.0 sec;   INR: 0.97 ratio         PTT - ( 14 Apr 2024 13:52 )  PTT:28.8 sec  Urinalysis Basic - ( 14 Apr 2024 23:05 )    Color: x / Appearance: x / SG: x / pH: x  Gluc: 127 mg/dL / Ketone: x  / Bili: x / Urobili: x   Blood: x / Protein: x / Nitrite: x   Leuk Esterase: x / RBC: x / WBC x   Sq Epi: x / Non Sq Epi: x / Bacteria: x        Lactate, Blood: 2.6 mmol/L (04-14 @ 23:05)  Lactate, Blood: 3.7 mmol/L (04-14 @ 15:55)    Blood, Urine: Moderate (04-14 @ 15:17)  Culture Results:   Growth in anaerobic bottle: Gram Negative Rods  Growth in aerobic bottle: Gram Negative Rods (04-14 @ 13:40)  Culture Results:   Growth in aerobic bottle: Gram Negative Rods  Growth in anaerobic bottle: Gram Negative Rods  Direct identification is available within approximately 3-5  hours either by Blood Panel Multiplexed PCR or Direct  MALDI-TOF. Details: [email or web address] (04-14 @ 13:30)    Troponin Trend: Lactate, Blood: 2.6 mmol/L (04-14 @ 23:05)  Lactate, Blood: 3.7 mmol/L (04-14 @ 15:55)    Lactate, Blood: 2.6 mmol/L (04-14 @ 23:05)  Lactate, Blood: 3.7 mmol/L (04-14 @ 15:55)                EKG:     RADIOLOGY STUDIES: Labs personally reviewed.  12.5   8.60  )-----------( 112      ( 14 Apr 2024 13:52 )             34.9     04-14    137  |  104  |  37<H>  ----------------------------<  127<H>  2.7<LL>   |  19<L>  |  1.59<H>    Ca    7.9<L>      14 Apr 2024 23:05  Phos  2.2     04-14  Mg     1.60     04-14    TPro  4.8<L>  /  Alb  2.6<L>  /  TBili  0.4  /  DBili  x   /  AST  43<H>  /  ALT  16  /  AlkPhos  44  04-14    LIVER FUNCTIONS - ( 14 Apr 2024 23:05 )  Alb: 2.6 g/dL / Pro: 4.8 g/dL / ALK PHOS: 44 U/L / ALT: 16 U/L / AST: 43 U/L / GGT: x         PT/INR - ( 14 Apr 2024 13:52 )   PT: 11.0 sec;   INR: 0.97 ratio    PTT - ( 14 Apr 2024 13:52 )  PTT:28.8 sec    Urinalysis Basic - ( 14 Apr 2024 23:05 )    Color: x / Appearance: x / SG: x / pH: x  Gluc: 127 mg/dL / Ketone: x  / Bili: x / Urobili: x   Blood: x / Protein: x / Nitrite: x   Leuk Esterase: x / RBC: x / WBC x   Sq Epi: x / Non Sq Epi: x / Bacteria: x    Lactate, Blood: 2.6 mmol/L (04-14 @ 23:05)  Lactate, Blood: 3.7 mmol/L (04-14 @ 15:55)    Blood, Urine: Moderate (04-14 @ 15:17)  Culture Results:   Growth in anaerobic bottle: Gram Negative Rods  Growth in aerobic bottle: Gram Negative Rods (04-14 @ 13:40)  Culture Results:   Growth in aerobic bottle: Gram Negative Rods  Growth in anaerobic bottle: Gram Negative Rods  Direct identification is available within approximately 3-5  hours either by Blood Panel Multiplexed PCR or Direct  MALDI-TOF. Details: [email or web address] (04-14 @ 13:30)    Troponin Trend: Lactate, Blood: 2.6 mmol/L (04-14 @ 23:05)  Lactate, Blood: 3.7 mmol/L (04-14 @ 15:55)    Lactate, Blood: 2.6 mmol/L (04-14 @ 23:05)  Lactate, Blood: 3.7 mmol/L (04-14 @ 15:55)    Imaging personally reviewed.  ACC: [id number] EXAM:  CT ABDOMEN AND PELVIS IC   ORDERED BY: JEAN CLAUDE ALVAREZ     PROCEDURE DATE:  04/14/2024    IMPRESSION:  Extensive right retroperitoneal stranding/inflammatory change felt to be   secondary to possible infection/inflammation of the right kidney. Abrupt   changing caliber of the right ureter as described above at the level of   the mid right ureter; etiology indeterminate. It appears to be a   stricture with resultant upstream hydronephrosis. If clinically indicated   direct visualization may be helpful for further evaluation.

## 2024-04-15 NOTE — PROGRESS NOTE ADULT - PROBLEM SELECTOR PLAN 2
CT showing R pyelonephritis  Urology consulted in ED --> if patient continues to have hydronephrosis would consider intervention with PCN vs stent given constricted appearance of R ureter    Plan: Treat as above

## 2024-04-15 NOTE — H&P ADULT - NSHPPHYSICALEXAM_GEN_ALL_CORE
VITALS:   T(C): 36.9 (04-14-24 @ 19:20), Max: 40.3 (04-14-24 @ 13:28)  HR: 71 (04-15-24 @ 00:00) (71 - 97)  BP: 105/58 (04-15-24 @ 00:00) (78/48 - 160/75)  RR: 18 (04-15-24 @ 00:00) (16 - 26)  SpO2: 100% (04-15-24 @ 00:00) (95% - 100%)    GENERAL: NAD, lying in bed comfortably  HEAD:  Atraumatic, normocephalic  EYES: EOMI, PERRLA, conjunctiva and sclera clear  ENT: Moist mucous membranes  NECK: Supple, no JVD  HEART: Regular rate and rhythm, no murmurs, rubs, or gallops  LUNGS: Unlabored respirations.  Clear to auscultation bilaterally, no crackles, wheezing, or rhonchi  ABDOMEN: Soft, nondistended. +R CVA tenderness  EXTREMITIES: 2+ peripheral pulses bilaterally. No clubbing, cyanosis, or edema  NERVOUS SYSTEM:  A&Ox3, no focal deficits   SKIN: No rashes or lesions VITALS:   T(C): 36.9 (04-14-24 @ 19:20), Max: 40.3 (04-14-24 @ 13:28)  HR: 71 (04-15-24 @ 00:00) (71 - 97)  BP: 105/58 (04-15-24 @ 00:00) (78/48 - 160/75)  RR: 18 (04-15-24 @ 00:00) (16 - 26)  SpO2: 100% (04-15-24 @ 00:00) (95% - 100%)    PHYSICAL EXAM:  General: Awake and alert.  No acute distress.  Head: Normocephalic, atraumatic.    Eyes: PERRL.  EOMI.  No scleral icterus.  No conjunctival pallor.  Mouth: Moist MM.  No oropharyngeal exudates.    Neck: Supple.  Full range of motion.  No JVD.  No LAD.  No thyromegaly.  Trachea midline.    Heart: RRR.  Normal S1 and S2.  No murmurs, rubs, or gallops.  No LE edema b/l.   Lungs: Nonlabored breathing.  Good inspiratory effort.  CTAB.  No wheezes, crackles, or rhonchi.    Abdomen: BS+, soft, nontender with no rebound or guarding, nondistended.  No hepatomegaly.  R CVA tenderness.  Skin: Warm and dry.  No rashes.  Extremities: No cyanosis.  2+ peripheral pulses b/l.  Musculoskeletal: No joint deformities.  No spinal or paraspinal tenderness.  Neuro: A&Ox3.  CN II-XII intact.  5/5 motor strength in UE and LE b/l.  Tactile sensation intact in UE and LE b/l.  No focal deficits. VITALS:   T(C): 36.9 (04-14-24 @ 19:20), Max: 40.3 (04-14-24 @ 13:28)  HR: 71 (04-15-24 @ 00:00) (71 - 97)  BP: 105/58 (04-15-24 @ 00:00) (78/48 - 160/75)  RR: 18 (04-15-24 @ 00:00) (16 - 26)  SpO2: 100% (04-15-24 @ 00:00) (95% - 100%)    PHYSICAL EXAM:  General: Awake and alert.  No acute distress.  Head: Normocephalic, atraumatic.    Eyes: PERRL.  EOMI.  No scleral icterus.  No conjunctival pallor.  Mouth: Dry MM.  No oropharyngeal exudates.    Neck: Supple.  Full range of motion.  No JVD.  No LAD.  No thyromegaly.  Trachea midline.    Heart: RRR.  Normal S1 and S2.  No murmurs, rubs, or gallops.  No LE edema b/l.   Lungs: Nonlabored breathing.  Good inspiratory effort.  CTAB.  No wheezes, crackles, or rhonchi.    Abdomen: BS+, soft, nontender with no rebound or guarding, nondistended.  No hepatomegaly.  R CVA tenderness.  Skin: Warm and dry.  No rashes.  Extremities: No cyanosis.  2+ peripheral pulses b/l.  Musculoskeletal: No joint deformities.  No spinal or paraspinal tenderness.  Neuro: A&Ox3.  CN II-XII intact.  5/5 motor strength in UE and LE b/l.  Tactile sensation intact in UE and LE b/l.  No focal deficits.

## 2024-04-15 NOTE — PROGRESS NOTE ADULT - PROBLEM SELECTOR PLAN 1
Tmax 104.5, BPlow 78/48, Lactate 2.9-->3.7-->2.6, UA positive, CT with evidence of pyelonephritis  History of recurrent UTIs  On Jardiance at home, which raises risk of UTI  MICU consulted for hypotension however BP recovered after isotonic fluids     Plan:  - f/u Ucx, Bcx  - treat with ceftriaxone --> Bcx with E coli   - DC Jardiance on discharge  - continue maintenance fluids for BP support Tmax 104.5, BPlow 78/48, Lactate 2.9-->3.7-->2.6, UA positive, CT with evidence of pyelonephritis  History of recurrent UTIs  On Jardiance at home, which raises risk of UTI    Plan:  - f/u Ucx, Bcx  - treat with ceftriaxone --> Bcx with E coli   - DC Jardiance on discharge  - continue maintenance fluids for BP support

## 2024-04-15 NOTE — H&P ADULT - PROBLEM SELECTOR PLAN 7
Diet: regular  DVT: heparin subq  Dispo: home pending course     PT consulted Home regimen: alendronate 70mg, calcitriol 0.25mg    - Hold for now Home regimen: alendronate 70mg qweekly, calcitriol 0.25mg qd    - Hold for now alendronate 70mg qweekly and calcitriol 0.25mg qd for now given electrolyte derangements  - Resume on discharge

## 2024-04-15 NOTE — H&P ADULT - PROBLEM SELECTOR PLAN 8
Diet: regular  DVT: heparin subq  Dispo: home pending course     PT consulted Diet: CC/DASH/TLC  DVT ppx: HSQ  Dispo: home pending course     PT consulted

## 2024-04-15 NOTE — H&P ADULT - PROBLEM SELECTOR PLAN 3
Home regimen: tradjenta 5mg qd, jardiance 10mg qd, ozempic (only took one dose - switched from glipizide 5mg last week)    Plan:  - Hold all while inpatient  - Glucose appears well controlled right now; will start with low ISS and can uptitrate to basal-bolus after determining requirements  - stop jardiance on DC Home regimen: HCTZ 12.5mg qd, Imdur ER 30mg qd, metoprolol succinate 25mg qd, nifedipine ER 60mg qd, Losartan 50mg qd    Plan:  - Hold all in setting of sepsis/hypotension. Can restart as BPs tolerate  - If at baseline hypertension requires this regimen to control, should undergo secondary hypertension workup Home regimen: Tradjenta 5mg qd, Jardiance 10mg qd, Ozempic (only took one dose - switched from glipizide 5mg last week)    Plan:  - Hold all outpatient DM meds while inpatient  - Glucose appears well controlled right now --> no evidence of DKA on labs, including euglycemic DKA given Jardiance use at home  - Start with low-dose ISS and FS checks qAC TID and qHS and can uptitrate to basal-bolus after determining requirements  - Check A1c  - Endocrine consult to be obtained pending A1c results  - D/C Jardiance on discharge

## 2024-04-15 NOTE — H&P ADULT - PROBLEM SELECTOR PLAN 6
Diet: regular  DVT: heparin subq  Dispo: home pending course     PT consulted Home regimen: alendronate 70mg, calcitriol 0.25mg    - Hold for now sCr 1.48-->1.52, unclear baseline  - most likely prerenal/intrarenal in etiology     - continue maintenance fluids and trend sCr Home regimen: HCTZ 12.5mg qd, Imdur ER 30mg qd, metoprolol succinate 25mg qd, nifedipine ER 60mg qd, Losartan 50mg qd    Plan:  - Hold all home BP meds in setting of sepsis/hypotension. Can restart as BPs tolerate.  - Monitor VS q4hrs  - If at baseline hypertension requires this regimen to control, should undergo secondary hypertension workup

## 2024-04-15 NOTE — H&P ADULT - PROBLEM SELECTOR PLAN 4
sCr 1.48-->1.52, unclear baseline  - most likely prerenal/intrarenal in etiology     - continue maintenance fluids and trend sCr Home regimen: tradjenta 5mg qd, jardiance 10mg qd, ozempic (only took one dose - switched from glipizide 5mg last week)    Plan:  - Hold all while inpatient  - Glucose appears well controlled right now; will start with low ISS and can uptitrate to basal-bolus after determining requirements  - stop jardiance on DC Potassium 2.9-->2.7-->2.5 in ED despite repletion  - has now received 10meq iv K x6 doses and one packet Kphos  - possibly in setting of HCTZ (K-wasting) and initial contraction alkalosis    Plan:  - trend K and aggressively replete  - likely hold HCTZ on discharge Serum K 2.9-->2.7-->2.5 in ED despite repletion with IV KCl.   - has now received IV 10 mEq KCl x6 doses and one packet of Kphos  - possibly in setting of HCTZ (K-wasting) and initial contraction alkalosis  - EKG with no concerning changes    Plan:  - Monitor serum K and aggressively replete PRN  - Check serum Mg and replete PRN  - Likely hold HCTZ on discharge  - Monitor for arrhythmias with repeat EKG in AM  - Switch to tele if serum K remains persistently low

## 2024-04-15 NOTE — H&P ADULT - NSICDXPASTMEDICALHX_GEN_ALL_CORE_FT
PAST MEDICAL HISTORY:  CVA (cerebral vascular accident)     Hypertension     Type 2 diabetes mellitus

## 2024-04-15 NOTE — H&P ADULT - PROBLEM SELECTOR PLAN 2
Home regimen: HCTZ 12.5mg qd, Imdur ER 30mg qd, metoprolol succinate 25mg qd, nifedipine ER 60mg qd, Losartan 50mg qd    Plan:  - Hold all in setting of sepsis/hypotension. Can restart as BPs tolerate  - If at baseline hypertension requires this regimen to control, should undergo secondary hypertension workup CT showing R pyelonephritis  Urology consulted in ED --> if patient continues to have hydronephrosis would consider intervention with PCN vs stent given constricted appearance of R ureter    Plan: Treat as above CTAP with evidence of R pyelonephritis and abrupt changing caliber of the right ureter at the level of the mid right ureter.    Plan:  - Treat as above for sepsis  - Urology consulted in ED --> if pt continues to have hydronephrosis, would consider intervention with PCN vs stent given constricted appearance of R ureter  - D/C Jardiance on discharge

## 2024-04-15 NOTE — H&P ADULT - ASSESSMENT
Pt is an 82 yo woman with PMH of DM2, HTN, CVA w/ LT sided deficits, recurrent UTIs, who presents with sepsis in the setting of UTI.  84 yo woman with PMH of DM2, HTN, CVA w/ LT sided deficits, recurrent UTIs, who presents for hyperglycemia, found to have sepsis 2/2 pyelonephritis.

## 2024-04-15 NOTE — PROGRESS NOTE ADULT - PROBLEM SELECTOR PLAN 8
Diet: regular  DVT: heparin subq  Dispo: home pending course     PT consulted Diet: consistent carb   DVT: heparin subq  Dispo: home pending course     PT consulted

## 2024-04-15 NOTE — PROGRESS NOTE ADULT - PROBLEM SELECTOR PLAN 6
sCr 1.48-->1.52, unclear baseline  - most likely prerenal/intrarenal in etiology     - continue maintenance fluids and trend sCr sCr 1.48-->1.52, unclear baseline  - most likely prerenal/intrarenal in etiology   - continue maintenance fluids and trend sCr

## 2024-04-15 NOTE — H&P ADULT - PROBLEM SELECTOR PLAN 5
Home regimen: alendronate 70mg, calcitriol 0.25mg    - Hold for now sCr 1.48-->1.52, unclear baseline  - most likely prerenal/intrarenal in etiology     - continue maintenance fluids and trend sCr Home regimen: tradjenta 5mg qd, jardiance 10mg qd, ozempic (only took one dose - switched from glipizide 5mg last week)    Plan:  - Hold all while inpatient  - Glucose appears well controlled right now; will start with low ISS and can uptitrate to basal-bolus after determining requirements  - stop jardiance on DC SCr 1.48-->1.52, unclear baseline.  - most likely prerenal vs. obstructive uropathy in etiology     Plan:  - S/p 4L bolus of IVF. C/w LR at 100 cc/hr for now.  - Monitor SCr and urine output  - Avoid NSAIDs and nephrotoxic agents  - Renally dose meds  - Pt also with metabolic acidosis on admission, likely 2/2 sepsis from pyelonephritis and severe hypokalemia. Manage as above for sepsis and hypokalemia.

## 2024-04-16 ENCOUNTER — TRANSCRIPTION ENCOUNTER (OUTPATIENT)
Age: 84
End: 2024-04-16

## 2024-04-16 ENCOUNTER — RESULT REVIEW (OUTPATIENT)
Age: 84
End: 2024-04-16

## 2024-04-16 LAB
-  AMPICILLIN/SULBACTAM: SIGNIFICANT CHANGE UP
-  AMPICILLIN: SIGNIFICANT CHANGE UP
-  AZTREONAM: SIGNIFICANT CHANGE UP
-  CEFAZOLIN: SIGNIFICANT CHANGE UP
-  CEFEPIME: SIGNIFICANT CHANGE UP
-  CEFOXITIN: SIGNIFICANT CHANGE UP
-  CEFTRIAXONE: SIGNIFICANT CHANGE UP
-  CIPROFLOXACIN: SIGNIFICANT CHANGE UP
-  ERTAPENEM: SIGNIFICANT CHANGE UP
-  GENTAMICIN: SIGNIFICANT CHANGE UP
-  IMIPENEM: SIGNIFICANT CHANGE UP
-  LEVOFLOXACIN: SIGNIFICANT CHANGE UP
-  MEROPENEM: SIGNIFICANT CHANGE UP
-  PIPERACILLIN/TAZOBACTAM: SIGNIFICANT CHANGE UP
-  TOBRAMYCIN: SIGNIFICANT CHANGE UP
-  TRIMETHOPRIM/SULFAMETHOXAZOLE: SIGNIFICANT CHANGE UP
A1C WITH ESTIMATED AVERAGE GLUCOSE RESULT: 6.6 % — HIGH (ref 4–5.6)
ALBUMIN SERPL ELPH-MCNC: 2.6 G/DL — LOW (ref 3.3–5)
ALP SERPL-CCNC: 114 U/L — SIGNIFICANT CHANGE UP (ref 40–120)
ALT FLD-CCNC: 10 U/L — SIGNIFICANT CHANGE UP (ref 4–33)
ANION GAP SERPL CALC-SCNC: 16 MMOL/L — HIGH (ref 7–14)
ANISOCYTOSIS BLD QL: SLIGHT — SIGNIFICANT CHANGE UP
APPEARANCE UR: ABNORMAL
AST SERPL-CCNC: 36 U/L — HIGH (ref 4–32)
BACTERIA # UR AUTO: NEGATIVE /HPF — SIGNIFICANT CHANGE UP
BASE EXCESS BLDV CALC-SCNC: -3.2 MMOL/L — LOW (ref -2–3)
BASOPHILS # BLD AUTO: 0 K/UL — SIGNIFICANT CHANGE UP (ref 0–0.2)
BASOPHILS NFR BLD AUTO: 0 % — SIGNIFICANT CHANGE UP (ref 0–2)
BILIRUB SERPL-MCNC: <0.2 MG/DL — SIGNIFICANT CHANGE UP (ref 0.2–1.2)
BILIRUB UR-MCNC: NEGATIVE — SIGNIFICANT CHANGE UP
BUN SERPL-MCNC: 39 MG/DL — HIGH (ref 7–23)
CA-I SERPL-SCNC: 1.18 MMOL/L — SIGNIFICANT CHANGE UP (ref 1.15–1.33)
CALCIUM SERPL-MCNC: 7.8 MG/DL — LOW (ref 8.4–10.5)
CAST: 2 /LPF — SIGNIFICANT CHANGE UP (ref 0–4)
CHLORIDE BLDV-SCNC: 105 MMOL/L — SIGNIFICANT CHANGE UP (ref 96–108)
CHLORIDE SERPL-SCNC: 103 MMOL/L — SIGNIFICANT CHANGE UP (ref 98–107)
CO2 BLDV-SCNC: 22.3 MMOL/L — SIGNIFICANT CHANGE UP (ref 22–26)
CO2 SERPL-SCNC: 18 MMOL/L — LOW (ref 22–31)
COLOR SPEC: YELLOW — SIGNIFICANT CHANGE UP
CREAT SERPL-MCNC: 1.86 MG/DL — HIGH (ref 0.5–1.3)
CULTURE RESULTS: ABNORMAL
CULTURE RESULTS: ABNORMAL
DIFF PNL FLD: ABNORMAL
EGFR: 27 ML/MIN/1.73M2 — LOW
EOSINOPHIL # BLD AUTO: 0 K/UL — SIGNIFICANT CHANGE UP (ref 0–0.5)
EOSINOPHIL NFR BLD AUTO: 0 % — SIGNIFICANT CHANGE UP (ref 0–6)
ESTIMATED AVERAGE GLUCOSE: 143 — SIGNIFICANT CHANGE UP
GAS PNL BLDV: 135 MMOL/L — LOW (ref 136–145)
GAS PNL BLDV: SIGNIFICANT CHANGE UP
GLUCOSE BLDC GLUCOMTR-MCNC: 106 MG/DL — HIGH (ref 70–99)
GLUCOSE BLDC GLUCOMTR-MCNC: 141 MG/DL — HIGH (ref 70–99)
GLUCOSE BLDC GLUCOMTR-MCNC: 169 MG/DL — HIGH (ref 70–99)
GLUCOSE BLDC GLUCOMTR-MCNC: 171 MG/DL — HIGH (ref 70–99)
GLUCOSE BLDV-MCNC: 127 MG/DL — HIGH (ref 70–99)
GLUCOSE SERPL-MCNC: 125 MG/DL — HIGH (ref 70–99)
GLUCOSE UR QL: 500 MG/DL
HCO3 BLDV-SCNC: 21 MMOL/L — LOW (ref 22–29)
HCT VFR BLD CALC: 34.6 % — SIGNIFICANT CHANGE UP (ref 34.5–45)
HCT VFR BLDA CALC: 38 % — SIGNIFICANT CHANGE UP (ref 34.5–46.5)
HGB BLD CALC-MCNC: 12.8 G/DL — SIGNIFICANT CHANGE UP (ref 11.7–16.1)
HGB BLD-MCNC: 12.3 G/DL — SIGNIFICANT CHANGE UP (ref 11.5–15.5)
IANC: 21.79 K/UL — HIGH (ref 1.8–7.4)
KETONES UR-MCNC: NEGATIVE MG/DL — SIGNIFICANT CHANGE UP
LACTATE BLDV-MCNC: 2.1 MMOL/L — HIGH (ref 0.5–2)
LEUKOCYTE ESTERASE UR-ACNC: ABNORMAL
LYMPHOCYTES # BLD AUTO: 1.8 K/UL — SIGNIFICANT CHANGE UP (ref 1–3.3)
LYMPHOCYTES # BLD AUTO: 6.2 % — LOW (ref 13–44)
MACROCYTES BLD QL: SLIGHT — SIGNIFICANT CHANGE UP
MAGNESIUM SERPL-MCNC: 1.9 MG/DL — SIGNIFICANT CHANGE UP (ref 1.6–2.6)
MANUAL SMEAR VERIFICATION: SIGNIFICANT CHANGE UP
MCHC RBC-ENTMCNC: 30.4 PG — SIGNIFICANT CHANGE UP (ref 27–34)
MCHC RBC-ENTMCNC: 35.5 GM/DL — SIGNIFICANT CHANGE UP (ref 32–36)
MCV RBC AUTO: 85.6 FL — SIGNIFICANT CHANGE UP (ref 80–100)
METAMYELOCYTES # FLD: 1.8 % — HIGH (ref 0–1)
METHOD TYPE: SIGNIFICANT CHANGE UP
MICROCYTES BLD QL: SIGNIFICANT CHANGE UP
MONOCYTES # BLD AUTO: 1.8 K/UL — HIGH (ref 0–0.9)
MONOCYTES NFR BLD AUTO: 6.2 % — SIGNIFICANT CHANGE UP (ref 2–14)
MRSA PCR RESULT.: SIGNIFICANT CHANGE UP
NEUTROPHILS # BLD AUTO: 23.91 K/UL — HIGH (ref 1.8–7.4)
NEUTROPHILS NFR BLD AUTO: 66.1 % — SIGNIFICANT CHANGE UP (ref 43–77)
NEUTS BAND # BLD: 16.1 % — CRITICAL HIGH (ref 0–6)
NITRITE UR-MCNC: NEGATIVE — SIGNIFICANT CHANGE UP
NRBC # BLD: 1 /100 WBCS — HIGH (ref 0–0)
ORGANISM # SPEC MICROSCOPIC CNT: ABNORMAL
OSMOLALITY UR: 406 MOSM/KG — SIGNIFICANT CHANGE UP (ref 50–1200)
OVALOCYTES BLD QL SMEAR: SLIGHT — SIGNIFICANT CHANGE UP
PCO2 BLDV: 35 MMHG — LOW (ref 39–52)
PH BLDV: 7.39 — SIGNIFICANT CHANGE UP (ref 7.32–7.43)
PH UR: 6 — SIGNIFICANT CHANGE UP (ref 5–8)
PHOSPHATE SERPL-MCNC: 2.9 MG/DL — SIGNIFICANT CHANGE UP (ref 2.5–4.5)
PLAT MORPH BLD: NORMAL — SIGNIFICANT CHANGE UP
PLATELET # BLD AUTO: 54 K/UL — LOW (ref 150–400)
PLATELET COUNT - ESTIMATE: ABNORMAL
PO2 BLDV: 37 MMHG — SIGNIFICANT CHANGE UP (ref 25–45)
POIKILOCYTOSIS BLD QL AUTO: SLIGHT — SIGNIFICANT CHANGE UP
POTASSIUM BLDV-SCNC: 3.2 MMOL/L — LOW (ref 3.5–5.1)
POTASSIUM SERPL-MCNC: 3.3 MMOL/L — LOW (ref 3.5–5.3)
POTASSIUM SERPL-SCNC: 3.3 MMOL/L — LOW (ref 3.5–5.3)
PROT SERPL-MCNC: 5.4 G/DL — LOW (ref 6–8.3)
PROT UR-MCNC: 30 MG/DL
RBC # BLD: 4.04 M/UL — SIGNIFICANT CHANGE UP (ref 3.8–5.2)
RBC # FLD: 15.2 % — HIGH (ref 10.3–14.5)
RBC BLD AUTO: NORMAL — SIGNIFICANT CHANGE UP
RBC CASTS # UR COMP ASSIST: 17 /HPF — HIGH (ref 0–4)
S AUREUS DNA NOSE QL NAA+PROBE: SIGNIFICANT CHANGE UP
SAO2 % BLDV: 65.6 % — LOW (ref 67–88)
SODIUM SERPL-SCNC: 137 MMOL/L — SIGNIFICANT CHANGE UP (ref 135–145)
SP GR SPEC: 1.02 — SIGNIFICANT CHANGE UP (ref 1–1.03)
SPECIMEN SOURCE: SIGNIFICANT CHANGE UP
SPECIMEN SOURCE: SIGNIFICANT CHANGE UP
SPHEROCYTES BLD QL SMEAR: SLIGHT — SIGNIFICANT CHANGE UP
SQUAMOUS # UR AUTO: 2 /HPF — SIGNIFICANT CHANGE UP (ref 0–5)
UROBILINOGEN FLD QL: 0.2 MG/DL — SIGNIFICANT CHANGE UP (ref 0.2–1)
VARIANT LYMPHS # BLD: 3.6 % — SIGNIFICANT CHANGE UP (ref 0–6)
WBC # BLD: 29.09 K/UL — HIGH (ref 3.8–10.5)
WBC # FLD AUTO: 29.09 K/UL — HIGH (ref 3.8–10.5)
WBC UR QL: 30 /HPF — HIGH (ref 0–5)

## 2024-04-16 PROCEDURE — 93306 TTE W/DOPPLER COMPLETE: CPT | Mod: 26

## 2024-04-16 PROCEDURE — 99232 SBSQ HOSP IP/OBS MODERATE 35: CPT | Mod: GC

## 2024-04-16 RX ORDER — POTASSIUM CHLORIDE 20 MEQ
40 PACKET (EA) ORAL ONCE
Refills: 0 | Status: COMPLETED | OUTPATIENT
Start: 2024-04-16 | End: 2024-04-16

## 2024-04-16 RX ORDER — LOSARTAN POTASSIUM 100 MG/1
50 TABLET, FILM COATED ORAL DAILY
Refills: 0 | Status: DISCONTINUED | OUTPATIENT
Start: 2024-04-16 | End: 2024-04-16

## 2024-04-16 RX ADMIN — Medication 40 MILLIEQUIVALENT(S): at 10:19

## 2024-04-16 RX ADMIN — HEPARIN SODIUM 5000 UNIT(S): 5000 INJECTION INTRAVENOUS; SUBCUTANEOUS at 13:46

## 2024-04-16 RX ADMIN — HEPARIN SODIUM 5000 UNIT(S): 5000 INJECTION INTRAVENOUS; SUBCUTANEOUS at 06:48

## 2024-04-16 RX ADMIN — Medication 3 MILLILITER(S): at 04:02

## 2024-04-16 RX ADMIN — Medication 3 MILLILITER(S): at 16:29

## 2024-04-16 RX ADMIN — LOSARTAN POTASSIUM 50 MILLIGRAM(S): 100 TABLET, FILM COATED ORAL at 10:19

## 2024-04-16 RX ADMIN — Medication 3 MILLILITER(S): at 10:00

## 2024-04-16 RX ADMIN — ATORVASTATIN CALCIUM 10 MILLIGRAM(S): 80 TABLET, FILM COATED ORAL at 22:43

## 2024-04-16 RX ADMIN — CHLORHEXIDINE GLUCONATE 1 APPLICATION(S): 213 SOLUTION TOPICAL at 13:51

## 2024-04-16 RX ADMIN — HEPARIN SODIUM 5000 UNIT(S): 5000 INJECTION INTRAVENOUS; SUBCUTANEOUS at 22:44

## 2024-04-16 RX ADMIN — CLOPIDOGREL BISULFATE 75 MILLIGRAM(S): 75 TABLET, FILM COATED ORAL at 13:46

## 2024-04-16 RX ADMIN — CEFTRIAXONE 100 MILLIGRAM(S): 500 INJECTION, POWDER, FOR SOLUTION INTRAMUSCULAR; INTRAVENOUS at 03:15

## 2024-04-16 RX ADMIN — Medication 1: at 18:41

## 2024-04-16 NOTE — DISCHARGE NOTE PROVIDER - NSDCMRMEDTOKEN_GEN_ALL_CORE_FT
alendronate 70 mg oral tablet: 1 tab(s) orally once a week  atorvastatin 10 mg oral tablet: 1 tab(s) orally once a day (at bedtime)  calcitriol 0.25 mcg oral capsule: 1 cap(s) orally once a day  clopidogrel 75 mg oral tablet: 1 tab(s) orally once a day  hydroCHLOROthiazide 12.5 mg oral tablet: 1 tab(s) orally once a day  Imdur 30 mg oral tablet, extended release: 1 tab(s) orally once a day  Jardiance 10 mg oral tablet: 1 tab(s) orally once a day  Linzess 145 mcg oral capsule: 1 cap(s) orally once a day  losartan 50 mg oral tablet: 1 tab(s) orally  metoprolol succinate 25 mg oral tablet, extended release: 1 tab(s) orally once a day  Nephplex Rx oral tablet: 1 tab(s) orally  NIFEdipine 60 mg oral tablet, extended release: 1 tab(s) orally once a day  semaglutide 2 mg/3 mL (0.25 mg or 0.5 mg dose) subcutaneous solution: 0.5 milligram(s) subcutaneously once a week  Tradjenta 5 mg oral tablet: 1 tab(s) orally once a day  Vascepa 1 g oral capsule: 2 cap(s) orally 2 times a day   alendronate 70 mg oral tablet: 1 tab(s) orally once a week  atorvastatin 10 mg oral tablet: 1 tab(s) orally once a day (at bedtime)  calcitriol 0.25 mcg oral capsule: 1 cap(s) orally once a day  ciprofloxacin 500 mg oral tablet: 1 tab(s) orally 2 times a day  clopidogrel 75 mg oral tablet: 1 tab(s) orally once a day  hydroCHLOROthiazide 12.5 mg oral tablet: 1 tab(s) orally once a day  Imdur 30 mg oral tablet, extended release: 1 tab(s) orally once a day  Linzess 145 mcg oral capsule: 1 cap(s) orally once a day  losartan 50 mg oral tablet: 1 tab(s) orally  metoprolol succinate 25 mg oral tablet, extended release: 1 tab(s) orally once a day  Nephplex Rx oral tablet: 1 tab(s) orally  NIFEdipine 60 mg oral tablet, extended release: 1 tab(s) orally once a day  semaglutide 2 mg/3 mL (0.25 mg or 0.5 mg dose) subcutaneous solution: 0.5 milligram(s) subcutaneously once a week  Tradjenta 5 mg oral tablet: 1 tab(s) orally once a day  Vascepa 1 g oral capsule: 2 cap(s) orally 2 times a day   alendronate 70 mg oral tablet: 1 tab(s) orally once a week  atorvastatin 10 mg oral tablet: 1 tab(s) orally once a day (at bedtime)  calcitriol 0.25 mcg oral capsule: 1 cap(s) orally once a day  ciprofloxacin 500 mg oral tablet: 1 tab(s) orally 2 times a day  clopidogrel 75 mg oral tablet: 1 tab(s) orally once a day  hydroCHLOROthiazide 12.5 mg oral tablet: 1 tab(s) orally once a day  Imdur 30 mg oral tablet, extended release: 1 tab(s) orally once a day  Linzess 145 mcg oral capsule: 1 cap(s) orally once a day  losartan 50 mg oral tablet: 1 tab(s) orally  metoprolol succinate 25 mg oral tablet, extended release: 1 tab(s) orally once a day  Nephplex Rx oral tablet: 1 tab(s) orally  NIFEdipine 60 mg oral tablet, extended release: 1 tab(s) orally once a day  physical therapy: outpatient physical therapy ICD 62.1  semaglutide 2 mg/3 mL (0.25 mg or 0.5 mg dose) subcutaneous solution: 0.5 milligram(s) subcutaneously once a week  Tradjenta 5 mg oral tablet: 1 tab(s) orally once a day  Vascepa 1 g oral capsule: 2 cap(s) orally 2 times a day

## 2024-04-16 NOTE — PROGRESS NOTE ADULT - PROBLEM SELECTOR PLAN 8
Diet: consistent carb   DVT: heparin subq  Dispo: home pending course     PT consulted Diet: consistent carb   DVT: heparin subq  Dispo: pedning medically optimization

## 2024-04-16 NOTE — DISCHARGE NOTE PROVIDER - HOSPITAL COURSE
Discharge Summary     Admit Date: 04-14-24  Discharge Date:    Admission diagnoses: Sepsis  Discharge diagnoses: E. Coli Pyelonephritis     Hospital Course:   For full details, please see H&P, progress notes, consult notes and ancillary notes. Briefly, CHENG KIM is a 84yo F with a PMH of DM2, HTN, CVA with left sided weakness presenting for back pain and vomiting found to have R pyelonephritis c/b E. coli bacteremia and R hydroureteronephrosis. The pyelonephritis was treated with IV ceftriaxone. The R hydroureteronephrosis was evaluated by urology and conservatively managed with antibiotics as a complication of pyelonephritis.     On day of discharge, patient is clinically stable with no new exam findings or acute symptoms compared to prior. The patient was seen by the attending physician on the date of discharge and deemed stable and acceptable for discharge. The patient's chronic medical conditions were treated accordingly per the patient's home medication regimen. The patient's medication reconciliation (with changes made to chronic medications), follow up appointments, discharge orders, instructions, and significant lab and diagnostic studies are as noted.     Discharge follow up action items:     1. Follow up with PCP in 1-2 weeks. Follow up with Urology in  2. Follow up labs, path, & imaging ***  3. Medication changes ***  4. On hold medications ***    Patient's ordered code status: ***    Patient disposition: ***     Discharge Summary     Admit Date: 04-14-24  Discharge Date:    Admission diagnoses: Sepsis  Discharge diagnoses: E. Coli Pyelonephritis     Hospital Course:   For full details, please see H&P, progress notes, consult notes and ancillary notes. Briefly, CHENG KIM is a 82yo F with a PMH of DM2, HTN, CVA with left sided weakness presenting for back pain and vomiting found to have R pyelonephritis c/b E. coli bacteremia and R hydroureteronephrosis. The pyelonephritis was treated with IV ceftriaxone. The R hydroureteronephrosis was evaluated by urology and conservatively managed with antibiotics as a complication of pyelonephritis.     On day of discharge, patient is clinically stable with no new exam findings or acute symptoms compared to prior. The patient was seen by the attending physician on the date of discharge and deemed stable and acceptable for discharge. The patient's chronic medical conditions were treated accordingly per the patient's home medication regimen. The patient's medication reconciliation (with changes made to chronic medications), follow up appointments, discharge orders, instructions, and significant lab and diagnostic studies are as noted.     Discharge follow up action items:     1. Follow up with PCP in 1-2 weeks. Follow up with Urology in two weeks.   2. Follow up labs, path, & imaging ***  3. Medication changes ***  4. On hold medications ***    Patient's ordered code status: full code     Patient disposition: ***     Discharge Summary     Admit Date: 04-14-24  Discharge Date:    Admission diagnoses: Sepsis  Discharge diagnoses: E. Coli Pyelonephritis     Hospital Course:   For full details, please see H&P, progress notes, consult notes and ancillary notes. Briefly, CHENG KIM is a 84yo F with a PMH of DM2, HTN, CVA with left sided weakness presenting for back pain and vomiting found to have R pyelonephritis c/b E. coli bacteremia and R hydroureteronephrosis. The pyelonephritis was treated with IV ceftriaxone. The R hydroureteronephrosis was evaluated by urology and conservatively managed with antibiotics as a complication of pyelonephritis.     On day of discharge, patient is clinically stable with no new exam findings or acute symptoms compared to prior. The patient was seen by the attending physician on the date of discharge and deemed stable and acceptable for discharge. The patient's chronic medical conditions were treated accordingly per the patient's home medication regimen. The patient's medication reconciliation (with changes made to chronic medications), follow up appointments, discharge orders, instructions, and significant lab and diagnostic studies are as noted.     Discharge follow up action items:     1. Follow up with PCP in 1-2 weeks. Follow up with Urology in one week.   2. Medication changes: Ciprofloxacin 500mg BID x3 days   3. On hold medications: Jardiance stopped due to UTI risk     Patient's ordered code status: full code     Patient disposition: Home      Discharge Summary     Admit Date: 04-14-24  Discharge Date:    Admission diagnoses: Sepsis  Discharge diagnoses: E. Coli Pyelonephritis     Hospital Course:   For full details, please see H&P, progress notes, consult notes and ancillary notes. Briefly, CHENG KIM is a 82yo F with a PMH of DM2, HTN, CVA with left sided weakness presenting for back pain and vomiting found to have R pyelonephritis c/b E. coli bacteremia and R hydroureteronephrosis. The pyelonephritis was treated with IV ceftriaxone. The R hydroureteronephrosis was evaluated by urology and conservatively managed with antibiotics as a complication of pyelonephritis.    On day of discharge, patient is clinically stable with no new exam findings or acute symptoms compared to prior. The patient was seen by the attending physician on the date of discharge and deemed stable and acceptable for discharge. The patient's chronic medical conditions were treated accordingly per the patient's home medication regimen. The patient's medication reconciliation (with changes made to chronic medications), follow up appointments, discharge orders, instructions, and significant lab and diagnostic studies are as noted.     Discharge follow up action items:     1. Follow up with PCP in 1-2 weeks. Follow up with Urology in one week.   2. Medication changes: Ciprofloxacin 500mg BID x3 days   3. On hold medications: Jardiance stopped due to UTI risk     Patient's ordered code status: full code     Patient disposition: Home

## 2024-04-16 NOTE — PROGRESS NOTE ADULT - PROBLEM SELECTOR PLAN 1
Tmax 104.5, BPlow 78/48, Lactate 2.9-->3.7-->2.6, UA positive, CT with evidence of pyelonephritis  History of recurrent UTIs  On Jardiance at home, which raises risk of UTI    Plan:  - f/u Ucx, Bcx  - treat with ceftriaxone --> Bcx with E coli   - DC Jardiance on discharge  - continue maintenance fluids for BP support Tmax 104.5, BPlow 78/48, Lactate 2.9-->3.7-->2.6, UA positive, CT with evidence of pyelonephritis  History of recurrent UTIs  On Jardiance at home, which raises risk of UTI    Plan:  - f/u Ucx, Bcx-> Bcx ordered for clearance   - treat with ceftriaxone --> Bcx with E coli   - DC Jardiance on discharge  - continue maintenance fluids for BP support

## 2024-04-16 NOTE — DISCHARGE NOTE PROVIDER - NSFOLLOWUPCLINICS_GEN_ALL_ED_FT
[place]  Internal Medicine  [address] [place] [id number]  Phone: [phone]  Fax:   Follow Up Time: 1 week    [facility]  Urology  [address] [place] [address]  Phone: [phone]  Fax:   Follow Up Time: 1 week

## 2024-04-16 NOTE — DISCHARGE NOTE PROVIDER - NSDCFUSCHEDAPPT_GEN_ALL_CORE_FT
Kim Frias  [facility]  OP[facility]ALM [address] [facility]  Scheduled Appointment: 05/22/2024

## 2024-04-16 NOTE — PROGRESS NOTE ADULT - PROBLEM SELECTOR PLAN 4
- possibly in setting of HCTZ (K-wasting) and initial contraction alkalosis    Plan:  - trend K and aggressively replete  - likely hold HCTZ on discharge

## 2024-04-16 NOTE — DISCHARGE NOTE PROVIDER - NSDCFUADDAPPT_GEN_ALL_CORE_FT
APPTS ARE READY TO BE MADE: [X ] YES    Best Family or Patient Contact (if needed):    Additional Information about above appointments (if needed):    1: Internal Medicine   2: Urology   3:     Other comments or requests:    APPTS ARE READY TO BE MADE: [X ] YES    Best Family or Patient Contact (if needed):    Additional Information about above appointments (if needed):    1: Internal Medicine   2: Urology   3:     Met with the patient and family member face to face, however their family member advised they prefer to coordinate the care on their own.   Other comments or requests:

## 2024-04-16 NOTE — PROGRESS NOTE ADULT - PROBLEM SELECTOR PLAN 3
Home regimen: HCTZ 12.5mg qd, Imdur ER 30mg qd, metoprolol succinate 25mg qd, nifedipine ER 60mg qd, Losartan 50mg qd    Plan:  - Hold all in setting of sepsis/hypotension. Can restart as BPs tolerate Home regimen: HCTZ 12.5mg qd, Imdur ER 30mg qd, metoprolol succinate 25mg qd, nifedipine ER 60mg qd, Losartan 50mg qd    Plan:  - will resume AHs as tolerated in context of sepsis   - Losartan given one time 4/16

## 2024-04-16 NOTE — DISCHARGE NOTE PROVIDER - NSDCCPCAREPLAN_GEN_ALL_CORE_FT
PRINCIPAL DISCHARGE DIAGNOSIS  Diagnosis: Sepsis  Assessment and Plan of Treatment: You came for back pain and were found to have a UTI that had infected your kidney, we cultured you and we found that your were growing E.Coli in your antibiotics. To asses the infection we did a CT scan which found that you had fluid buildup in your kidney and urinary tract(the right kidney and ureter) it is unclear if this was due to the infection or due to another diseases process, urology just recommeded that we monitor and treat with antibiotics. Outside the hospital you should follow with the urologists.  If you develop back pain, buring on urination, urinary urgency, or generally feel unwell please seek urgent medical care.      SECONDARY DISCHARGE DIAGNOSES  Diagnosis: Pyelonephritis  Assessment and Plan of Treatment:      PRINCIPAL DISCHARGE DIAGNOSIS  Diagnosis: Sepsis  Assessment and Plan of Treatment: You came for back pain and were found to have a UTI that had infected your kidney, we cultured you and we found that your were growing E.Coli in your blood. To asses the infection we did a CT scan which found that you had fluid buildup in your kidney and urinary tract(the right kidney and ureter) it is unclear if this was due to the infection or due to another diseases process, urology just recommeded that we monitor and treat with antibiotics(IV ceftriaxone). Outside the hospital you should follow with the urologists.  If you develop back pain, buring on urination, urinary urgency, or generally feel unwell please seek urgent medical care.     PRINCIPAL DISCHARGE DIAGNOSIS  Diagnosis: Sepsis  Assessment and Plan of Treatment: You came for back pain and were found to have a UTI that had infected your kidney, we cultured you and we found that your were growing E.Coli in your blood. To asses the infection we did a CT scan which found that you had fluid buildup in your kidney and urinary tract(the right kidney and ureter) it is unclear if this was due to the infection or due to another diseases process, urology just recommeded that we monitor and treat with antibiotics(IV ceftriaxone). Outside the hospital you should follow with the urologists, we will also give you a short course of ciprofloxacin to take.   If you develop back pain, buring on urination, urinary urgency, or generally feel unwell please seek urgent medical care.

## 2024-04-16 NOTE — PROGRESS NOTE ADULT - PROBLEM SELECTOR PLAN 2
CT showing R pyelonephritis  Urology consulted in ED --> if patient continues to have hydronephrosis would consider intervention with PCN vs stent given constricted appearance of R ureter    Plan: Treat as above CT showing R pyelonephritis  Urology consulted in ED --> if patient continues to have hydronephrosis would consider intervention with PCN vs stent given constricted appearance of R ureter  -No acute mgmt per urology for worsening LAYLA     Plan: Treat as above

## 2024-04-16 NOTE — DISCHARGE NOTE PROVIDER - NSDCCPTREATMENT_GEN_ALL_CORE_FT
[Headache] : headache [Negative] : Gastrointestinal PRINCIPAL PROCEDURE  Procedure: CT abdomen & pelvis  Findings and Treatment: Right pyelonephritis. Increased density and degree of perinephric   stranding/fluid is likely a combination of underlying infection and   superimposed nephritis/nephropathy.  Persistent mild right hydroureteronephrosis level of the mid ureter,   recommend follow-up nonemergent CT urogram or follow-up with urology for   ureteroscope.  Extensive right retroperitoneal stranding/inflammatory change felt to be   secondary to possible infection/inflammation of the right kidney. Abrupt   changing caliber of the right ureter as described above at the level of   the mid right ureter; etiology indeterminate. It appears to be a   stricture with resultant upstream hydronephrosis. If clinically indicated   direct visualization may be helpful for further evaluation.       PRINCIPAL PROCEDURE  Procedure: CT abdomen & pelvis  Findings and Treatment: Right pyelonephritis. Increased density and degree of perinephric   stranding/fluid is likely a combination of underlying infection and   superimposed nephritis/nephropathy.  Persistent mild right hydroureteronephrosis level of the mid ureter,   recommend follow-up nonemergent CT urogram or follow-up with urology for   ureteroscope.  Extensive right retroperitoneal stranding/inflammatory change felt to be   secondary to possible infection/inflammation of the right kidney. Abrupt   changing caliber of the right ureter as described above at the level of   the mid right ureter; etiology indeterminate. It appears to be a   stricture with resultant upstream hydronephrosis. If clinically indicated   direct visualization may be helpful for further evaluation.        SECONDARY PROCEDURE  Procedure: Chest xray, PA & lateral  Findings and Treatment: Mild congestion.      Procedure: Transthoracic echocardiography (TTE)  Findings and Treatment: 1. Left ventricular systolic function is normal with an ejection fraction of 67 % by Briscoe's method of disks.   2. Normal right ventricular cavity size andnormal systolic function. Tricuspid annular plane systolic excursion (TAPSE) is 2.0 cm (normal >=1.7 cm).   3. The left atrium is normal in size.   4. The right atrium is normal in size.   5. No significant valvular disease.   6. Estimated pulmonary artery systolic pressure is 32 mmHg.   7. The inferior vena cava is normal in size measuring 1.86 cm in diameter, (normal <2.1cm) with normal inspiratory collapse (normal >50%) consistent with normal right atrial pressure (~3, range 0-5mmHg).   8. No pericardial effusion seen.   9. Right pleural effusion noted.

## 2024-04-16 NOTE — PROGRESS NOTE ADULT - SUBJECTIVE AND OBJECTIVE BOX
***************************************************************  Terry Villasenor, PGY1  Internal Medicine   Preferred contact via Microsoft Teams   ***************************************************************    CHENG KIM  83y  MRN: [id number]    Patient is a 83y old  Female who presents with a chief complaint of sepsis (15 Apr 2024 07:19)      Interval/Overnight Events: no events ON.     Subjective: Pt seen and examined at bedside. Denies fever, CP, SOB, abn pain, N/V, dysuria. Tolerating diet.      MEDICATIONS  (STANDING):  albuterol/ipratropium for Nebulization 3 milliLiter(s) Nebulizer every 6 hours  atorvastatin 10 milliGRAM(s) Oral at bedtime  cefTRIAXone   IVPB      cefTRIAXone   IVPB 2000 milliGRAM(s) IV Intermittent every 24 hours  chlorhexidine 2% Cloths 1 Application(s) Topical daily  clopidogrel Tablet 75 milliGRAM(s) Oral daily  dextrose 10% Bolus 125 milliLiter(s) IV Bolus once  dextrose 5%. 1000 milliLiter(s) (100 mL/Hr) IV Continuous <Continuous>  dextrose 5%. 1000 milliLiter(s) (50 mL/Hr) IV Continuous <Continuous>  dextrose 50% Injectable 25 Gram(s) IV Push once  dextrose 50% Injectable 12.5 Gram(s) IV Push once  glucagon  Injectable 1 milliGRAM(s) IntraMuscular once  heparin   Injectable 5000 Unit(s) SubCutaneous every 8 hours  insulin lispro (ADMELOG) corrective regimen sliding scale   SubCutaneous three times a day before meals  insulin lispro (ADMELOG) corrective regimen sliding scale   SubCutaneous at bedtime    MEDICATIONS  (PRN):  dextrose Oral Gel 15 Gram(s) Oral once PRN Blood Glucose LESS THAN 70 milliGRAM(s)/deciliter      Objective:    Vitals: Vital Signs Last 24 Hrs  T(C): 36.4 (04-16-24 @ 05:30), Max: 37.8 (04-15-24 @ 21:50)  T(F): 97.5 (04-16-24 @ 05:30), Max: 100 (04-15-24 @ 21:50)  HR: 79 (04-16-24 @ 05:30) (55 - 97)  BP: 146/74 (04-16-24 @ 05:30) (123/56 - 160/70)  BP(mean): --  RR: 18 (04-16-24 @ 05:30) (18 - 26)  SpO2: 99% (04-16-24 @ 05:30) (95% - 100%)                I&O's Summary    15 Apr 2024 07:01  -  16 Apr 2024 06:58  --------------------------------------------------------  IN: 0 mL / OUT: 650 mL / NET: -650 mL        PHYSICAL EXAM:  GENERAL: NAD  HEAD:  Atraumatic, Normocephalic  EYES: EOMI, conjunctiva and sclera clear  CHEST/LUNG: Clear to auscultation bilaterally; No rales, rhonchi, wheezing, or rubs  HEART: Regular rate and rhythm; No murmurs, rubs, or gallops  ABDOMEN: Soft, Nontender, Nondistended;   SKIN: No rashes or lesions  NERVOUS SYSTEM:  Alert & Oriented X3, no focal deficits    LABS:                        11.1   20.45 )-----------( 64       ( 15 Apr 2024 05:20 )             32.1                         12.5   8.60  )-----------( 112      ( 14 Apr 2024 13:52 )             34.9     04-15    138  |  104  |  34<H>  ----------------------------<  145<H>  4.2   |  21<L>  |  1.71<H>  04-15    137  |  100  |  33<H>  ----------------------------<  132<H>  2.6<LL>   |  17<L>  |  1.59<H>  04-14    137  |  104  |  37<H>  ----------------------------<  127<H>  2.7<LL>   |  19<L>  |  1.59<H>    Ca    8.5      15 Apr 2024 14:47  Ca    7.3<L>      15 Apr 2024 05:20  Ca    7.9<L>      14 Apr 2024 23:05  Phos  2.2     04-14  Mg     1.60     04-14    TPro  5.3<L>  /  Alb  2.7<L>  /  TBili  0.2  /  DBili  x   /  AST  48<H>  /  ALT  17  /  AlkPhos  50  04-15  TPro  4.8<L>  /  Alb  2.6<L>  /  TBili  0.4  /  DBili  x   /  AST  43<H>  /  ALT  16  /  AlkPhos  44  04-14  TPro  7.1  /  Alb  3.9  /  TBili  0.5  /  DBili  x   /  AST  35<H>  /  ALT  13  /  AlkPhos  85  04-14    CAPILLARY BLOOD GLUCOSE      POCT Blood Glucose.: 155 mg/dL (15 Apr 2024 22:27)  POCT Blood Glucose.: 102 mg/dL (15 Apr 2024 08:52)    PT/INR - ( 14 Apr 2024 13:52 )   PT: 11.0 sec;   INR: 0.97 ratio         PTT - ( 14 Apr 2024 13:52 )  PTT:28.8 sec    Urinalysis Basic - ( 15 Apr 2024 14:47 )    Color: x / Appearance: x / SG: x / pH: x  Gluc: 145 mg/dL / Ketone: x  / Bili: x / Urobili: x   Blood: x / Protein: x / Nitrite: x   Leuk Esterase: x / RBC: x / WBC x   Sq Epi: x / Non Sq Epi: x / Bacteria: x          RADIOLOGY & ADDITIONAL TESTS:    Imaging Personally Reviewed:  [x ] YES  [ ] NO    Consultants involved in case:   Consultant(s) Notes Reviewed:  [ x] YES  [ ] NO:   Care Discussed with Consultants/Other Providers [x ] YES  [ ] NO         ***************************************************************  Terry Villasenor, PGY1  Internal Medicine   Preferred contact via [employer] Teams   ***************************************************************    CHENG KIM  83y  MRN: [id number]    Patient is a 83y old  Female who presents with a chief complaint of sepsis (15 Apr 2024 07:19)      Interval/Overnight Events: no events ON.     Subjective: Pt pleasant resting calmly.     MEDICATIONS  (STANDING):  albuterol/ipratropium for Nebulization 3 milliLiter(s) Nebulizer every 6 hours  atorvastatin 10 milliGRAM(s) Oral at bedtime  cefTRIAXone   IVPB      cefTRIAXone   IVPB 2000 milliGRAM(s) IV Intermittent every 24 hours  chlorhexidine 2% Cloths 1 Application(s) Topical daily  clopidogrel Tablet 75 milliGRAM(s) Oral daily  dextrose 10% Bolus 125 milliLiter(s) IV Bolus once  dextrose 5%. 1000 milliLiter(s) (100 mL/Hr) IV Continuous <Continuous>  dextrose 5%. 1000 milliLiter(s) (50 mL/Hr) IV Continuous <Continuous>  dextrose 50% Injectable 25 Gram(s) IV Push once  dextrose 50% Injectable 12.5 Gram(s) IV Push once  glucagon  Injectable 1 milliGRAM(s) IntraMuscular once  heparin   Injectable 5000 Unit(s) SubCutaneous every 8 hours  insulin lispro (ADMELOG) corrective regimen sliding scale   SubCutaneous three times a day before meals  insulin lispro (ADMELOG) corrective regimen sliding scale   SubCutaneous at bedtime    MEDICATIONS  (PRN):  dextrose Oral Gel 15 Gram(s) Oral once PRN Blood Glucose LESS THAN 70 milliGRAM(s)/deciliter      Objective:    Vitals: Vital Signs Last 24 Hrs  T(C): 36.4 (04-16-24 @ 05:30), Max: 37.8 (04-15-24 @ 21:50)  T(F): 97.5 (04-16-24 @ 05:30), Max: 100 (04-15-24 @ 21:50)  HR: 79 (04-16-24 @ 05:30) (55 - 97)  BP: 146/74 (04-16-24 @ 05:30) (123/56 - 160/70)  BP(mean): --  RR: 18 (04-16-24 @ 05:30) (18 - 26)  SpO2: 99% (04-16-24 @ 05:30) (95% - 100%)                I&O's Summary    15 Apr 2024 07:01  -  16 Apr 2024 06:58  --------------------------------------------------------  IN: 0 mL / OUT: 650 mL / NET: -650 mL        PHYSICAL EXAM:  GENERAL: NAD  HEAD:  Atraumatic, Normocephalic  EYES: EOMI, conjunctiva and sclera clear  CHEST/LUNG: Clear to auscultation bilaterally; No rales, rhonchi, wheezing, or rubs  HEART: Regular rate and rhythm; No murmurs, rubs, or gallops  ABDOMEN: Soft, Nontender, Nondistended;   SKIN: No rashes or lesions  NERVOUS SYSTEM:  Alert & Oriented X3, no focal deficits    LABS:                        11.1   20.45 )-----------( 64       ( 15 Apr 2024 05:20 )             32.1                         12.5   8.60  )-----------( 112      ( 14 Apr 2024 13:52 )             34.9     04-15    138  |  104  |  34<H>  ----------------------------<  145<H>  4.2   |  21<L>  |  1.71<H>  04-15    137  |  100  |  33<H>  ----------------------------<  132<H>  2.6<LL>   |  17<L>  |  1.59<H>  04-14    137  |  104  |  37<H>  ----------------------------<  127<H>  2.7<LL>   |  19<L>  |  1.59<H>    Ca    8.5      15 Apr 2024 14:47  Ca    7.3<L>      15 Apr 2024 05:20  Ca    7.9<L>      14 Apr 2024 23:05  Phos  2.2     04-14  Mg     1.60     04-14    TPro  5.3<L>  /  Alb  2.7<L>  /  TBili  0.2  /  DBili  x   /  AST  48<H>  /  ALT  17  /  AlkPhos  50  04-15  TPro  4.8<L>  /  Alb  2.6<L>  /  TBili  0.4  /  DBili  x   /  AST  43<H>  /  ALT  16  /  AlkPhos  44  04-14  TPro  7.1  /  Alb  3.9  /  TBili  0.5  /  DBili  x   /  AST  35<H>  /  ALT  13  /  AlkPhos  85  04-14    CAPILLARY BLOOD GLUCOSE      POCT Blood Glucose.: 155 mg/dL (15 Apr 2024 22:27)  POCT Blood Glucose.: 102 mg/dL (15 Apr 2024 08:52)    PT/INR - ( 14 Apr 2024 13:52 )   PT: 11.0 sec;   INR: 0.97 ratio         PTT - ( 14 Apr 2024 13:52 )  PTT:28.8 sec    Urinalysis Basic - ( 15 Apr 2024 14:47 )    Color: x / Appearance: x / SG: x / pH: x  Gluc: 145 mg/dL / Ketone: x  / Bili: x / Urobili: x   Blood: x / Protein: x / Nitrite: x   Leuk Esterase: x / RBC: x / WBC x   Sq Epi: x / Non Sq Epi: x / Bacteria: x          RADIOLOGY & ADDITIONAL TESTS:    Imaging Personally Reviewed:  [x ] YES  [ ] NO    Consultants involved in case:   Consultant(s) Notes Reviewed:  [ x] YES  [ ] NO:   Care Discussed with Consultants/Other Providers [x ] YES  [ ] NO

## 2024-04-16 NOTE — PROGRESS NOTE ADULT - PROBLEM SELECTOR PLAN 6
sCr 1.48-->1.52, unclear baseline  - most likely prerenal/intrarenal in etiology   - continue maintenance fluids and trend sCr sCr 1.48-->1.52, unclear baseline  - most likely prerenal/intrarenal in etiology   - continue maintenance fluids and trend sCr  - Urine stuides  - No mgmt change per urology

## 2024-04-17 LAB
-  AMOXICILLIN/CLAVULANIC ACID: SIGNIFICANT CHANGE UP
-  AMPICILLIN/SULBACTAM: SIGNIFICANT CHANGE UP
-  AMPICILLIN: SIGNIFICANT CHANGE UP
-  AZTREONAM: SIGNIFICANT CHANGE UP
-  CEFAZOLIN: SIGNIFICANT CHANGE UP
-  CEFEPIME: SIGNIFICANT CHANGE UP
-  CEFOXITIN: SIGNIFICANT CHANGE UP
-  CEFTRIAXONE: SIGNIFICANT CHANGE UP
-  CEFUROXIME: SIGNIFICANT CHANGE UP
-  CIPROFLOXACIN: SIGNIFICANT CHANGE UP
-  ERTAPENEM: SIGNIFICANT CHANGE UP
-  GENTAMICIN: SIGNIFICANT CHANGE UP
-  IMIPENEM: SIGNIFICANT CHANGE UP
-  LEVOFLOXACIN: SIGNIFICANT CHANGE UP
-  MEROPENEM: SIGNIFICANT CHANGE UP
-  NITROFURANTOIN: SIGNIFICANT CHANGE UP
-  PIPERACILLIN/TAZOBACTAM: SIGNIFICANT CHANGE UP
-  TOBRAMYCIN: SIGNIFICANT CHANGE UP
-  TRIMETHOPRIM/SULFAMETHOXAZOLE: SIGNIFICANT CHANGE UP
ALBUMIN SERPL ELPH-MCNC: 2.7 G/DL — LOW (ref 3.3–5)
ALP SERPL-CCNC: 192 U/L — HIGH (ref 40–120)
ALT FLD-CCNC: 13 U/L — SIGNIFICANT CHANGE UP (ref 4–33)
ANION GAP SERPL CALC-SCNC: 12 MMOL/L — SIGNIFICANT CHANGE UP (ref 7–14)
ANION GAP SERPL CALC-SCNC: 19 MMOL/L — HIGH (ref 7–14)
AST SERPL-CCNC: 27 U/L — SIGNIFICANT CHANGE UP (ref 4–32)
BASE EXCESS BLDV CALC-SCNC: -2.4 MMOL/L — LOW (ref -2–3)
BASOPHILS # BLD AUTO: 0.02 K/UL — SIGNIFICANT CHANGE UP (ref 0–0.2)
BASOPHILS NFR BLD AUTO: 0.1 % — SIGNIFICANT CHANGE UP (ref 0–2)
BILIRUB SERPL-MCNC: 0.3 MG/DL — SIGNIFICANT CHANGE UP (ref 0.2–1.2)
BUN SERPL-MCNC: 27 MG/DL — HIGH (ref 7–23)
BUN SERPL-MCNC: 30 MG/DL — HIGH (ref 7–23)
CA-I SERPL-SCNC: 1.22 MMOL/L — SIGNIFICANT CHANGE UP (ref 1.15–1.33)
CALCIUM SERPL-MCNC: 8.3 MG/DL — LOW (ref 8.4–10.5)
CALCIUM SERPL-MCNC: 8.4 MG/DL — SIGNIFICANT CHANGE UP (ref 8.4–10.5)
CHLORIDE BLDV-SCNC: 110 MMOL/L — HIGH (ref 96–108)
CHLORIDE SERPL-SCNC: 108 MMOL/L — HIGH (ref 98–107)
CHLORIDE SERPL-SCNC: 110 MMOL/L — HIGH (ref 98–107)
CO2 BLDV-SCNC: 24.5 MMOL/L — SIGNIFICANT CHANGE UP (ref 22–26)
CO2 SERPL-SCNC: 17 MMOL/L — LOW (ref 22–31)
CO2 SERPL-SCNC: 21 MMOL/L — LOW (ref 22–31)
CREAT SERPL-MCNC: 1.06 MG/DL — SIGNIFICANT CHANGE UP (ref 0.5–1.3)
CREAT SERPL-MCNC: 1.16 MG/DL — SIGNIFICANT CHANGE UP (ref 0.5–1.3)
CULTURE RESULTS: ABNORMAL
EGFR: 47 ML/MIN/1.73M2 — LOW
EGFR: 53 ML/MIN/1.73M2 — LOW
EOSINOPHIL # BLD AUTO: 0.18 K/UL — SIGNIFICANT CHANGE UP (ref 0–0.5)
EOSINOPHIL NFR BLD AUTO: 0.5 % — SIGNIFICANT CHANGE UP (ref 0–6)
GAS PNL BLDV: 138 MMOL/L — SIGNIFICANT CHANGE UP (ref 136–145)
GAS PNL BLDV: SIGNIFICANT CHANGE UP
GLUCOSE BLDC GLUCOMTR-MCNC: 105 MG/DL — HIGH (ref 70–99)
GLUCOSE BLDC GLUCOMTR-MCNC: 142 MG/DL — HIGH (ref 70–99)
GLUCOSE BLDC GLUCOMTR-MCNC: 154 MG/DL — HIGH (ref 70–99)
GLUCOSE BLDC GLUCOMTR-MCNC: 169 MG/DL — HIGH (ref 70–99)
GLUCOSE BLDV-MCNC: 103 MG/DL — HIGH (ref 70–99)
GLUCOSE SERPL-MCNC: 8 MG/DL — CRITICAL LOW (ref 70–99)
GLUCOSE SERPL-MCNC: 93 MG/DL — SIGNIFICANT CHANGE UP (ref 70–99)
HCO3 BLDV-SCNC: 23 MMOL/L — SIGNIFICANT CHANGE UP (ref 22–29)
HCT VFR BLD CALC: 35.4 % — SIGNIFICANT CHANGE UP (ref 34.5–45)
HCT VFR BLD CALC: 36.2 % — SIGNIFICANT CHANGE UP (ref 34.5–45)
HCT VFR BLDA CALC: 38 % — SIGNIFICANT CHANGE UP (ref 34.5–46.5)
HGB BLD CALC-MCNC: 12.8 G/DL — SIGNIFICANT CHANGE UP (ref 11.7–16.1)
HGB BLD-MCNC: 12.4 G/DL — SIGNIFICANT CHANGE UP (ref 11.5–15.5)
HGB BLD-MCNC: 12.6 G/DL — SIGNIFICANT CHANGE UP (ref 11.5–15.5)
IANC: 31.35 K/UL — HIGH (ref 1.8–7.4)
IMM GRANULOCYTES NFR BLD AUTO: 1.4 % — HIGH (ref 0–0.9)
LACTATE BLDV-MCNC: 2.4 MMOL/L — HIGH (ref 0.5–2)
LYMPHOCYTES # BLD AUTO: 1.47 K/UL — SIGNIFICANT CHANGE UP (ref 1–3.3)
LYMPHOCYTES # BLD AUTO: 4.3 % — LOW (ref 13–44)
MAGNESIUM SERPL-MCNC: 2 MG/DL — SIGNIFICANT CHANGE UP (ref 1.6–2.6)
MAGNESIUM SERPL-MCNC: 2 MG/DL — SIGNIFICANT CHANGE UP (ref 1.6–2.6)
MCHC RBC-ENTMCNC: 29.9 PG — SIGNIFICANT CHANGE UP (ref 27–34)
MCHC RBC-ENTMCNC: 30 PG — SIGNIFICANT CHANGE UP (ref 27–34)
MCHC RBC-ENTMCNC: 34.8 GM/DL — SIGNIFICANT CHANGE UP (ref 32–36)
MCHC RBC-ENTMCNC: 35 GM/DL — SIGNIFICANT CHANGE UP (ref 32–36)
MCV RBC AUTO: 85.5 FL — SIGNIFICANT CHANGE UP (ref 80–100)
MCV RBC AUTO: 85.8 FL — SIGNIFICANT CHANGE UP (ref 80–100)
METHOD TYPE: SIGNIFICANT CHANGE UP
MONOCYTES # BLD AUTO: 0.83 K/UL — SIGNIFICANT CHANGE UP (ref 0–0.9)
MONOCYTES NFR BLD AUTO: 2.4 % — SIGNIFICANT CHANGE UP (ref 2–14)
NEUTROPHILS # BLD AUTO: 31.35 K/UL — HIGH (ref 1.8–7.4)
NEUTROPHILS NFR BLD AUTO: 91.3 % — HIGH (ref 43–77)
NRBC # BLD: 0 /100 WBCS — SIGNIFICANT CHANGE UP (ref 0–0)
NRBC # BLD: 0 /100 WBCS — SIGNIFICANT CHANGE UP (ref 0–0)
NRBC # FLD: 0 K/UL — SIGNIFICANT CHANGE UP (ref 0–0)
NRBC # FLD: 0 K/UL — SIGNIFICANT CHANGE UP (ref 0–0)
ORGANISM # SPEC MICROSCOPIC CNT: ABNORMAL
ORGANISM # SPEC MICROSCOPIC CNT: ABNORMAL
PCO2 BLDV: 42 MMHG — SIGNIFICANT CHANGE UP (ref 39–52)
PH BLDV: 7.35 — SIGNIFICANT CHANGE UP (ref 7.32–7.43)
PHOSPHATE SERPL-MCNC: 1.5 MG/DL — LOW (ref 2.5–4.5)
PHOSPHATE SERPL-MCNC: 1.7 MG/DL — LOW (ref 2.5–4.5)
PLATELET # BLD AUTO: 49 K/UL — LOW (ref 150–400)
PLATELET # BLD AUTO: 51 K/UL — LOW (ref 150–400)
PO2 BLDV: 51 MMHG — HIGH (ref 25–45)
POTASSIUM BLDV-SCNC: 3.4 MMOL/L — LOW (ref 3.5–5.1)
POTASSIUM SERPL-MCNC: 3.5 MMOL/L — SIGNIFICANT CHANGE UP (ref 3.5–5.3)
POTASSIUM SERPL-MCNC: 3.5 MMOL/L — SIGNIFICANT CHANGE UP (ref 3.5–5.3)
POTASSIUM SERPL-SCNC: 3.5 MMOL/L — SIGNIFICANT CHANGE UP (ref 3.5–5.3)
POTASSIUM SERPL-SCNC: 3.5 MMOL/L — SIGNIFICANT CHANGE UP (ref 3.5–5.3)
PROT SERPL-MCNC: 5.8 G/DL — LOW (ref 6–8.3)
RBC # BLD: 4.14 M/UL — SIGNIFICANT CHANGE UP (ref 3.8–5.2)
RBC # BLD: 4.22 M/UL — SIGNIFICANT CHANGE UP (ref 3.8–5.2)
RBC # FLD: 15.6 % — HIGH (ref 10.3–14.5)
RBC # FLD: 15.7 % — HIGH (ref 10.3–14.5)
SAO2 % BLDV: 83.6 % — SIGNIFICANT CHANGE UP (ref 67–88)
SODIUM SERPL-SCNC: 143 MMOL/L — SIGNIFICANT CHANGE UP (ref 135–145)
SODIUM SERPL-SCNC: 144 MMOL/L — SIGNIFICANT CHANGE UP (ref 135–145)
SPECIMEN SOURCE: SIGNIFICANT CHANGE UP
WBC # BLD: 34.33 K/UL — HIGH (ref 3.8–10.5)
WBC # BLD: 34.49 K/UL — HIGH (ref 3.8–10.5)
WBC # FLD AUTO: 34.33 K/UL — HIGH (ref 3.8–10.5)
WBC # FLD AUTO: 34.49 K/UL — HIGH (ref 3.8–10.5)

## 2024-04-17 PROCEDURE — 99232 SBSQ HOSP IP/OBS MODERATE 35: CPT | Mod: GC

## 2024-04-17 RX ORDER — ISOSORBIDE MONONITRATE 60 MG/1
30 TABLET, EXTENDED RELEASE ORAL DAILY
Refills: 0 | Status: DISCONTINUED | OUTPATIENT
Start: 2024-04-17 | End: 2024-04-18

## 2024-04-17 RX ORDER — IPRATROPIUM/ALBUTEROL SULFATE 18-103MCG
3 AEROSOL WITH ADAPTER (GRAM) INHALATION ONCE
Refills: 0 | Status: COMPLETED | OUTPATIENT
Start: 2024-04-17 | End: 2024-04-17

## 2024-04-17 RX ORDER — FUROSEMIDE 40 MG
20 TABLET ORAL ONCE
Refills: 0 | Status: COMPLETED | OUTPATIENT
Start: 2024-04-17 | End: 2024-04-17

## 2024-04-17 RX ADMIN — Medication 3 MILLILITER(S): at 05:34

## 2024-04-17 RX ADMIN — ISOSORBIDE MONONITRATE 30 MILLIGRAM(S): 60 TABLET, EXTENDED RELEASE ORAL at 12:22

## 2024-04-17 RX ADMIN — Medication 1: at 18:44

## 2024-04-17 RX ADMIN — ATORVASTATIN CALCIUM 10 MILLIGRAM(S): 80 TABLET, FILM COATED ORAL at 21:56

## 2024-04-17 RX ADMIN — Medication 1: at 13:56

## 2024-04-17 RX ADMIN — CLOPIDOGREL BISULFATE 75 MILLIGRAM(S): 75 TABLET, FILM COATED ORAL at 11:37

## 2024-04-17 RX ADMIN — HEPARIN SODIUM 5000 UNIT(S): 5000 INJECTION INTRAVENOUS; SUBCUTANEOUS at 05:25

## 2024-04-17 RX ADMIN — CEFTRIAXONE 100 MILLIGRAM(S): 500 INJECTION, POWDER, FOR SOLUTION INTRAMUSCULAR; INTRAVENOUS at 04:38

## 2024-04-17 RX ADMIN — Medication 85 MILLIMOLE(S): at 11:00

## 2024-04-17 RX ADMIN — CHLORHEXIDINE GLUCONATE 1 APPLICATION(S): 213 SOLUTION TOPICAL at 11:46

## 2024-04-17 RX ADMIN — Medication 20 MILLIGRAM(S): at 11:37

## 2024-04-17 NOTE — PROGRESS NOTE ADULT - PROBLEM SELECTOR PLAN 5
Home regimen: tradjenta 5mg qd, jardiance 10mg qd, ozempic (only took one dose - switched from glipizide 5mg last week)    Plan:  - Hold all while inpatient  - Glucose appears well controlled right now; will start with low ISS and can uptitrate to basal-bolus after determining requirements  - stop jardiance on DC sCr 1.48-->1.52, unclear baseline  - most likely prerenal/intrarenal in etiology   - continue maintenance fluids and trend sCr  - No mgmt change per urology

## 2024-04-17 NOTE — PHYSICAL THERAPY INITIAL EVALUATION ADULT - SIT-TO-STAND BALANCE
Routine Visit Note: OT EVAL VISIT    Skill/education provided: OT EVAL COMPLETED. OT INSTRUCTED PT ON SAFE KITCHEN/BATHROOM MOBILITY AND TRANSFERS.    Patient/caregiver response: PT RETURNED DEMO.    Plan for next visit: CONTINUE ADLS/TRANSFER RETRAINING. with rolling walker/fair plus

## 2024-04-17 NOTE — PROVIDER CONTACT NOTE (CRITICAL VALUE NOTIFICATION) - BACKGROUND
Patient with Dx of Sepsis, hx dm type 2, hypertension.
Patient with Dx of Sepsis, hx of Dm type 2, hypertension.

## 2024-04-17 NOTE — PROGRESS NOTE ADULT - PROBLEM SELECTOR PLAN 7
Home regimen: alendronate 70mg, calcitriol 0.25mg    - Hold for now Diet: consistent carb   DVT: heparin subq  Dispo: pedning medically optimization

## 2024-04-17 NOTE — PROGRESS NOTE ADULT - SUBJECTIVE AND OBJECTIVE BOX
***************************************************************  Terry Villasenor, PGY1  Internal Medicine   Preferred contact via [employer] Teams   ***************************************************************    CHENG KIM  83y  MRN: [id number]    Patient is a 83y old  Female who presents with a chief complaint of Sepsis (2024 13:59)      Interval/Overnight Events: no events ON.     Subjective: Pt seen and examined at bedside. Denies fever, CP, SOB, abn pain, N/V, dysuria. Tolerating diet.      MEDICATIONS  (STANDING):  atorvastatin 10 milliGRAM(s) Oral at bedtime  cefTRIAXone   IVPB      cefTRIAXone   IVPB 2000 milliGRAM(s) IV Intermittent every 24 hours  chlorhexidine 2% Cloths 1 Application(s) Topical daily  clopidogrel Tablet 75 milliGRAM(s) Oral daily  dextrose 10% Bolus 125 milliLiter(s) IV Bolus once  dextrose 5%. 1000 milliLiter(s) (50 mL/Hr) IV Continuous <Continuous>  dextrose 5%. 1000 milliLiter(s) (100 mL/Hr) IV Continuous <Continuous>  dextrose 50% Injectable 25 Gram(s) IV Push once  dextrose 50% Injectable 12.5 Gram(s) IV Push once  glucagon  Injectable 1 milliGRAM(s) IntraMuscular once  heparin   Injectable 5000 Unit(s) SubCutaneous every 8 hours  insulin lispro (ADMELOG) corrective regimen sliding scale   SubCutaneous at bedtime  insulin lispro (ADMELOG) corrective regimen sliding scale   SubCutaneous three times a day before meals    MEDICATIONS  (PRN):  dextrose Oral Gel 15 Gram(s) Oral once PRN Blood Glucose LESS THAN 70 milliGRAM(s)/deciliter      Objective:    Vitals: Vital Signs Last 24 Hrs  T(C): 36.5 (24 @ 05:16), Max: 37.2 (24 @ 09:30)  T(F): 97.7 (24 @ 05:16), Max: 98.9 (24 @ 09:30)  HR: 88 (24 @ 05:16) (50 - 88)  BP: 160/82 (24 @ 05:16) (112/63 - 160/82)  BP(mean): --  RR: 20 (24 @ 05:16) (18 - 25)  SpO2: 97% (24 @ 05:16) (97% - 99%)                I&O's Summary    2024 07:01  -  2024 07:00  --------------------------------------------------------  IN: 0 mL / OUT: 1900 mL / NET: -1900 mL        PHYSICAL EXAM:  GENERAL: NAD  HEAD:  Atraumatic, Normocephalic  EYES: EOMI, conjunctiva and sclera clear  CHEST/LUNG: Clear to auscultation bilaterally; No rales, rhonchi, wheezing, or rubs  HEART: Regular rate and rhythm; No murmurs, rubs, or gallops  ABDOMEN: Soft, Nontender, Nondistended;   SKIN: No rashes or lesions  NERVOUS SYSTEM:  Alert & Oriented X3, no focal deficits    LABS:                        12.3   29.09 )-----------( 54       ( 2024 05:35 )             34.6                         11.1   20.45 )-----------( 64       ( 15 Apr 2024 05:20 )             32.1                         12.5   8.60  )-----------( 112      ( 2024 13:52 )             34.9     -    137  |  103  |  39<H>  ----------------------------<  125<H>  3.3<L>   |  18<L>  |  1.86<H>  -15    138  |  104  |  34<H>  ----------------------------<  145<H>  4.2   |  21<L>  |  1.71<H>  04-15    137  |  100  |  33<H>  ----------------------------<  132<H>  2.6<LL>   |  17<L>  |  1.59<H>    Ca    7.8<L>      2024 05:35  Ca    8.5      15 Apr 2024 14:47  Ca    7.3<L>      15 Apr 2024 05:20  Phos  2.9     04-16  Mg     1.90     -16    TPro  5.4<L>  /  Alb  2.6<L>  /  TBili  <0.2  /  DBili  x   /  AST  36<H>  /  ALT  10  /  AlkPhos  114  04-16  TPro  5.3<L>  /  Alb  2.7<L>  /  TBili  0.2  /  DBili  x   /  AST  48<H>  /  ALT  17  /  AlkPhos  50  04-15  TPro  4.8<L>  /  Alb  2.6<L>  /  TBili  0.4  /  DBili  x   /  AST  43<H>  /  ALT  16  /  AlkPhos  44  04-14    CAPILLARY BLOOD GLUCOSE      POCT Blood Glucose.: 169 mg/dL (2024 21:39)  POCT Blood Glucose.: 171 mg/dL (2024 18:00)  POCT Blood Glucose.: 141 mg/dL (2024 12:32)  POCT Blood Glucose.: 106 mg/dL (2024 09:19)        Urinalysis Basic - ( 2024 13:55 )    Color: Yellow / Appearance: Cloudy / S.017 / pH: x  Gluc: x / Ketone: Negative mg/dL  / Bili: Negative / Urobili: 0.2 mg/dL   Blood: x / Protein: 30 mg/dL / Nitrite: Negative   Leuk Esterase: Moderate / RBC: 17 /HPF / WBC 30 /HPF   Sq Epi: x / Non Sq Epi: 2 /HPF / Bacteria: Negative /HPF          RADIOLOGY & ADDITIONAL TESTS:    Imaging Personally Reviewed:  [x ] YES  [ ] NO    Consultants involved in case:   Consultant(s) Notes Reviewed:  [ x] YES  [ ] NO:   Care Discussed with Consultants/Other Providers [x ] YES  [ ] NO         ***************************************************************  Terry Villasenor, PGY1  Internal Medicine   Preferred contact via [employer] Teams   ***************************************************************    CHENG KIM  83y  MRN: [id number]    Patient is a 83y old  Female who presents with a chief complaint of Sepsis (2024 13:59)    Interval/Overnight Events: Given duoneb overnight     Subjective: Pt pleasant resting calmly.     MEDICATIONS  (STANDING):  atorvastatin 10 milliGRAM(s) Oral at bedtime  cefTRIAXone   IVPB      cefTRIAXone   IVPB 2000 milliGRAM(s) IV Intermittent every 24 hours  chlorhexidine 2% Cloths 1 Application(s) Topical daily  clopidogrel Tablet 75 milliGRAM(s) Oral daily  dextrose 10% Bolus 125 milliLiter(s) IV Bolus once  dextrose 5%. 1000 milliLiter(s) (50 mL/Hr) IV Continuous <Continuous>  dextrose 5%. 1000 milliLiter(s) (100 mL/Hr) IV Continuous <Continuous>  dextrose 50% Injectable 25 Gram(s) IV Push once  dextrose 50% Injectable 12.5 Gram(s) IV Push once  glucagon  Injectable 1 milliGRAM(s) IntraMuscular once  heparin   Injectable 5000 Unit(s) SubCutaneous every 8 hours  insulin lispro (ADMELOG) corrective regimen sliding scale   SubCutaneous at bedtime  insulin lispro (ADMELOG) corrective regimen sliding scale   SubCutaneous three times a day before meals    MEDICATIONS  (PRN):  dextrose Oral Gel 15 Gram(s) Oral once PRN Blood Glucose LESS THAN 70 milliGRAM(s)/deciliter      Objective:    Vitals: Vital Signs Last 24 Hrs  T(C): 36.5 (24 @ 05:16), Max: 37.2 (24 @ 09:30)  T(F): 97.7 (24 @ 05:16), Max: 98.9 (24 @ 09:30)  HR: 88 (24 @ 05:16) (50 - 88)  BP: 160/82 (24 @ 05:16) (112/63 - 160/82)  BP(mean): --  RR: 20 (24 @ 05:16) (18 - 25)  SpO2: 97% (24 @ 05:16) (97% - 99%)                I&O's Summary    2024 07:01  -  2024 07:00  --------------------------------------------------------  IN: 0 mL / OUT: 1900 mL / NET: -1900 mL        PHYSICAL EXAM:  EYES: EOMI, conjunctiva and sclera clear  CHEST/LUNG: Clear to auscultation bilaterally; No rales, rhonchi, wheezing, or rubs  HEART: Regular rate and rhythm; No murmurs, rubs, or gallops  ABDOMEN: Soft, Nontender, Nondistended;   SKIN: No rashes or lesions  NERVOUS SYSTEM:  Alert & Oriented X3, no focal deficits    LABS:                        12.3   29.09 )-----------( 54       ( 2024 05:35 )             34.6                         11.1   20.45 )-----------( 64       ( 15 Apr 2024 05:20 )             32.1                         12.5   8.60  )-----------( 112      ( 2024 13:52 )             34.9         137  |  103  |  39<H>  ----------------------------<  125<H>  3.3<L>   |  18<L>  |  1.86<H>  04-15    138  |  104  |  34<H>  ----------------------------<  145<H>  4.2   |  21<L>  |  1.71<H>  04-15    137  |  100  |  33<H>  ----------------------------<  132<H>  2.6<LL>   |  17<L>  |  1.59<H>    Ca    7.8<L>      2024 05:35  Ca    8.5      15 Apr 2024 14:47  Ca    7.3<L>      15 Apr 2024 05:20  Phos  2.9     04-16  Mg     1.90     04-16    TPro  5.4<L>  /  Alb  2.6<L>  /  TBili  <0.2  /  DBili  x   /  AST  36<H>  /  ALT  10  /  AlkPhos  114  04-16  TPro  5.3<L>  /  Alb  2.7<L>  /  TBili  0.2  /  DBili  x   /  AST  48<H>  /  ALT  17  /  AlkPhos  50  04-15  TPro  4.8<L>  /  Alb  2.6<L>  /  TBili  0.4  /  DBili  x   /  AST  43<H>  /  ALT  16  /  AlkPhos  44  04-14    CAPILLARY BLOOD GLUCOSE      POCT Blood Glucose.: 169 mg/dL (2024 21:39)  POCT Blood Glucose.: 171 mg/dL (2024 18:00)  POCT Blood Glucose.: 141 mg/dL (2024 12:32)  POCT Blood Glucose.: 106 mg/dL (2024 09:19)        Urinalysis Basic - ( 2024 13:55 )    Color: Yellow / Appearance: Cloudy / S.017 / pH: x  Gluc: x / Ketone: Negative mg/dL  / Bili: Negative / Urobili: 0.2 mg/dL   Blood: x / Protein: 30 mg/dL / Nitrite: Negative   Leuk Esterase: Moderate / RBC: 17 /HPF / WBC 30 /HPF   Sq Epi: x / Non Sq Epi: 2 /HPF / Bacteria: Negative /HPF          RADIOLOGY & ADDITIONAL TESTS:    Imaging Personally Reviewed:  [x ] YES  [ ] NO    Consultants involved in case:   Consultant(s) Notes Reviewed:  [ x] YES  [ ] NO:   Care Discussed with Consultants/Other Providers [x ] YES  [ ] NO

## 2024-04-17 NOTE — PROGRESS NOTE ADULT - PROBLEM SELECTOR PLAN 3
Home regimen: HCTZ 12.5mg qd, Imdur ER 30mg qd, metoprolol succinate 25mg qd, nifedipine ER 60mg qd, Losartan 50mg qd    Plan:  - will resume AHs as tolerated in context of sepsis   - Losartan given one time 4/16 Home regimen: HCTZ 12.5mg qd, Imdur ER 30mg qd, metoprolol succinate 25mg qd, nifedipine ER 60mg qd, Losartan 50mg qd    Plan:  - will resume AHs as tolerated in context of sepsis

## 2024-04-17 NOTE — CHART NOTE - NSCHARTNOTEFT_GEN_A_CORE
Patient admitted with fevers, flank pain at home found to have mild R hydroureter on CT scan to questionable transition point in the R mid ureter with read saying possible ureteral stricture. Unlikely that patient has ureteral stricture given that she has no risk factors for stricture formation and hydronephrosis can be seen transiently in pyelonephritis. Patient is now stable on abx and Cr downtrened. Urology to sign off, please have patient f/u opt with Dr. Campbell and will need renal lasix scan to assess for possible obstruction in the R kidney outpatient.    [facility]  [address] [place] [address], [address] [address] [phone]

## 2024-04-17 NOTE — PROVIDER CONTACT NOTE (CRITICAL VALUE NOTIFICATION) - ASSESSMENT
Patient afebrile, vs stable at thia
Fingerstick 105, patient awake and alert, A&Ox4, no complaints offered.

## 2024-04-17 NOTE — PHYSICAL THERAPY INITIAL EVALUATION ADULT - GENERAL OBSERVATIONS, REHAB EVAL
Consult received, chart reviewed. Patient received in bed, no apparent distress, +NC, /73, HR 88 bpm, SpO2 100%, family present. Pt. agreeable to participate in PT.

## 2024-04-17 NOTE — PHYSICAL THERAPY INITIAL EVALUATION ADULT - ADDITIONAL COMMENTS
Pt. was left in bed post PT Evaluation, no apparent distress, all lines intact, family present. RN made aware.

## 2024-04-17 NOTE — PROGRESS NOTE ADULT - PROBLEM SELECTOR PLAN 6
sCr 1.48-->1.52, unclear baseline  - most likely prerenal/intrarenal in etiology   - continue maintenance fluids and trend sCr  - Urine stuides  - No mgmt change per urology Home regimen: alendronate 70mg, calcitriol 0.25mg    - Hold for now

## 2024-04-17 NOTE — PROGRESS NOTE ADULT - PROBLEM SELECTOR PLAN 2
CT showing R pyelonephritis  Urology consulted in ED --> if patient continues to have hydronephrosis would consider intervention with PCN vs stent given constricted appearance of R ureter  -No acute mgmt per urology for worsening LAYLA     Plan: Treat as above CT showing R pyelonephritis  Urology consulted in ED --> if patient continues to have hydronephrosis would consider intervention with PCN vs stent given constricted appearance of R ureter  -No acute mgmt per urology     Plan: Treat as above

## 2024-04-17 NOTE — PROGRESS NOTE ADULT - PROBLEM SELECTOR PLAN 4
- possibly in setting of HCTZ (K-wasting) and initial contraction alkalosis    Plan:  - trend K and aggressively replete  - likely hold HCTZ on discharge Home regimen: tradjenta 5mg qd, jardiance 10mg qd, ozempic (only took one dose - switched from glipizide 5mg last week)    Plan:  - Hold all while inpatient  - Glucose appears well controlled right now; will start with low ISS and can uptitrate to basal-bolus after determining requirements  - stop jardiance on DC

## 2024-04-17 NOTE — PROGRESS NOTE ADULT - PROBLEM SELECTOR PLAN 1
Tmax 104.5, BPlow 78/48, Lactate 2.9-->3.7-->2.6, UA positive, CT with evidence of pyelonephritis  History of recurrent UTIs  On Jardiance at home, which raises risk of UTI    Plan:  - f/u Ucx, Bcx-> Bcx ordered for clearance   - treat with ceftriaxone --> Bcx with E coli   - DC Jardiance on discharge  - continue maintenance fluids for BP support

## 2024-04-18 ENCOUNTER — TRANSCRIPTION ENCOUNTER (OUTPATIENT)
Age: 84
End: 2024-04-18

## 2024-04-18 VITALS
DIASTOLIC BLOOD PRESSURE: 74 MMHG | HEART RATE: 71 BPM | SYSTOLIC BLOOD PRESSURE: 168 MMHG | TEMPERATURE: 98 F | RESPIRATION RATE: 18 BRPM | OXYGEN SATURATION: 99 %

## 2024-04-18 LAB
ALBUMIN SERPL ELPH-MCNC: 2.9 G/DL — LOW (ref 3.3–5)
ALP SERPL-CCNC: 157 U/L — HIGH (ref 40–120)
ALT FLD-CCNC: 11 U/L — SIGNIFICANT CHANGE UP (ref 4–33)
ANION GAP SERPL CALC-SCNC: 13 MMOL/L — SIGNIFICANT CHANGE UP (ref 7–14)
AST SERPL-CCNC: 19 U/L — SIGNIFICANT CHANGE UP (ref 4–32)
BASE EXCESS BLDV CALC-SCNC: -0.4 MMOL/L — SIGNIFICANT CHANGE UP (ref -2–3)
BASOPHILS # BLD AUTO: 0.16 K/UL — SIGNIFICANT CHANGE UP (ref 0–0.2)
BASOPHILS NFR BLD AUTO: 0.8 % — SIGNIFICANT CHANGE UP (ref 0–2)
BILIRUB SERPL-MCNC: 0.2 MG/DL — SIGNIFICANT CHANGE UP (ref 0.2–1.2)
BUN SERPL-MCNC: 21 MG/DL — SIGNIFICANT CHANGE UP (ref 7–23)
CA-I SERPL-SCNC: 1.25 MMOL/L — SIGNIFICANT CHANGE UP (ref 1.15–1.33)
CALCIUM SERPL-MCNC: 8.5 MG/DL — SIGNIFICANT CHANGE UP (ref 8.4–10.5)
CHLORIDE BLDV-SCNC: 109 MMOL/L — HIGH (ref 96–108)
CHLORIDE SERPL-SCNC: 110 MMOL/L — HIGH (ref 98–107)
CO2 BLDV-SCNC: 27.4 MMOL/L — HIGH (ref 22–26)
CO2 SERPL-SCNC: 22 MMOL/L — SIGNIFICANT CHANGE UP (ref 22–31)
CREAT SERPL-MCNC: 0.92 MG/DL — SIGNIFICANT CHANGE UP (ref 0.5–1.3)
EGFR: 63 ML/MIN/1.73M2 — SIGNIFICANT CHANGE UP
EOSINOPHIL # BLD AUTO: 0.35 K/UL — SIGNIFICANT CHANGE UP (ref 0–0.5)
EOSINOPHIL NFR BLD AUTO: 1.7 % — SIGNIFICANT CHANGE UP (ref 0–6)
GAS PNL BLDV: 141 MMOL/L — SIGNIFICANT CHANGE UP (ref 136–145)
GAS PNL BLDV: SIGNIFICANT CHANGE UP
GLUCOSE BLDC GLUCOMTR-MCNC: 112 MG/DL — HIGH (ref 70–99)
GLUCOSE BLDC GLUCOMTR-MCNC: 162 MG/DL — HIGH (ref 70–99)
GLUCOSE BLDV-MCNC: 98 MG/DL — SIGNIFICANT CHANGE UP (ref 70–99)
GLUCOSE SERPL-MCNC: 94 MG/DL — SIGNIFICANT CHANGE UP (ref 70–99)
HCO3 BLDV-SCNC: 26 MMOL/L — SIGNIFICANT CHANGE UP (ref 22–29)
HCT VFR BLD CALC: 35.9 % — SIGNIFICANT CHANGE UP (ref 34.5–45)
HCT VFR BLDA CALC: 38 % — SIGNIFICANT CHANGE UP (ref 34.5–46.5)
HGB BLD CALC-MCNC: 12.8 G/DL — SIGNIFICANT CHANGE UP (ref 11.7–16.1)
HGB BLD-MCNC: 12.6 G/DL — SIGNIFICANT CHANGE UP (ref 11.5–15.5)
IANC: 16.18 K/UL — HIGH (ref 1.8–7.4)
IMM GRANULOCYTES NFR BLD AUTO: 1.6 % — HIGH (ref 0–0.9)
LACTATE BLDV-MCNC: 2.2 MMOL/L — HIGH (ref 0.5–2)
LYMPHOCYTES # BLD AUTO: 1.46 K/UL — SIGNIFICANT CHANGE UP (ref 1–3.3)
LYMPHOCYTES # BLD AUTO: 7.3 % — LOW (ref 13–44)
MAGNESIUM SERPL-MCNC: 1.9 MG/DL — SIGNIFICANT CHANGE UP (ref 1.6–2.6)
MCHC RBC-ENTMCNC: 30.1 PG — SIGNIFICANT CHANGE UP (ref 27–34)
MCHC RBC-ENTMCNC: 35.1 GM/DL — SIGNIFICANT CHANGE UP (ref 32–36)
MCV RBC AUTO: 85.7 FL — SIGNIFICANT CHANGE UP (ref 80–100)
MONOCYTES # BLD AUTO: 1.58 K/UL — HIGH (ref 0–0.9)
MONOCYTES NFR BLD AUTO: 7.9 % — SIGNIFICANT CHANGE UP (ref 2–14)
NEUTROPHILS # BLD AUTO: 16.18 K/UL — HIGH (ref 1.8–7.4)
NEUTROPHILS NFR BLD AUTO: 80.7 % — HIGH (ref 43–77)
NRBC # BLD: 0 /100 WBCS — SIGNIFICANT CHANGE UP (ref 0–0)
NRBC # FLD: 0.04 K/UL — HIGH (ref 0–0)
PCO2 BLDV: 48 MMHG — SIGNIFICANT CHANGE UP (ref 39–52)
PH BLDV: 7.34 — SIGNIFICANT CHANGE UP (ref 7.32–7.43)
PHOSPHATE SERPL-MCNC: 3 MG/DL — SIGNIFICANT CHANGE UP (ref 2.5–4.5)
PLATELET # BLD AUTO: 49 K/UL — LOW (ref 150–400)
PO2 BLDV: 43 MMHG — SIGNIFICANT CHANGE UP (ref 25–45)
POTASSIUM BLDV-SCNC: 3.3 MMOL/L — LOW (ref 3.5–5.1)
POTASSIUM SERPL-MCNC: 3.6 MMOL/L — SIGNIFICANT CHANGE UP (ref 3.5–5.3)
POTASSIUM SERPL-SCNC: 3.6 MMOL/L — SIGNIFICANT CHANGE UP (ref 3.5–5.3)
PROT SERPL-MCNC: 6.1 G/DL — SIGNIFICANT CHANGE UP (ref 6–8.3)
RBC # BLD: 4.19 M/UL — SIGNIFICANT CHANGE UP (ref 3.8–5.2)
RBC # FLD: 15.3 % — HIGH (ref 10.3–14.5)
SAO2 % BLDV: 72.5 % — SIGNIFICANT CHANGE UP (ref 67–88)
SODIUM SERPL-SCNC: 145 MMOL/L — SIGNIFICANT CHANGE UP (ref 135–145)
WBC # BLD: 20.05 K/UL — HIGH (ref 3.8–10.5)
WBC # FLD AUTO: 20.05 K/UL — HIGH (ref 3.8–10.5)

## 2024-04-18 PROCEDURE — 99239 HOSP IP/OBS DSCHRG MGMT >30: CPT | Mod: GC

## 2024-04-18 RX ORDER — EMPAGLIFLOZIN 10 MG/1
1 TABLET, FILM COATED ORAL
Refills: 0 | DISCHARGE

## 2024-04-18 RX ORDER — CIPROFLOXACIN LACTATE 400MG/40ML
1 VIAL (ML) INTRAVENOUS
Qty: 6 | Refills: 0
Start: 2024-04-18 | End: 2024-04-20

## 2024-04-18 RX ADMIN — CEFTRIAXONE 100 MILLIGRAM(S): 500 INJECTION, POWDER, FOR SOLUTION INTRAMUSCULAR; INTRAVENOUS at 03:23

## 2024-04-18 RX ADMIN — Medication 1: at 13:35

## 2024-04-18 RX ADMIN — ISOSORBIDE MONONITRATE 30 MILLIGRAM(S): 60 TABLET, EXTENDED RELEASE ORAL at 06:17

## 2024-04-18 NOTE — PROVIDER CONTACT NOTE (OTHER) - SITUATION
Pt's BP was 160/70, RR 26, O2 sat 95% on RA, temp 100.0
Pt RR 24
Pt's BP was 171/73  SBP>160
pt with new expiratory wheeze and SOB

## 2024-04-18 NOTE — PROGRESS NOTE ADULT - SUBJECTIVE AND OBJECTIVE BOX
***************************************************************  Terry Villasenor, PGY1  Internal Medicine   Preferred contact via Microsoft Teams   ***************************************************************    CHENG KIM  83y  MRN: [id number]    Patient is a 83y old  Female who presents with a chief complaint of Sepsis (17 Apr 2024 07:29)      Interval/Overnight Events: no events ON.     Subjective: Pt seen and examined at bedside. Denies fever, CP, SOB, abn pain, N/V, dysuria. Tolerating diet.      MEDICATIONS  (STANDING):  atorvastatin 10 milliGRAM(s) Oral at bedtime  cefTRIAXone   IVPB      cefTRIAXone   IVPB 2000 milliGRAM(s) IV Intermittent every 24 hours  chlorhexidine 2% Cloths 1 Application(s) Topical daily  clopidogrel Tablet 75 milliGRAM(s) Oral daily  dextrose 10% Bolus 125 milliLiter(s) IV Bolus once  dextrose 5%. 1000 milliLiter(s) (100 mL/Hr) IV Continuous <Continuous>  dextrose 5%. 1000 milliLiter(s) (50 mL/Hr) IV Continuous <Continuous>  dextrose 50% Injectable 25 Gram(s) IV Push once  dextrose 50% Injectable 12.5 Gram(s) IV Push once  glucagon  Injectable 1 milliGRAM(s) IntraMuscular once  insulin lispro (ADMELOG) corrective regimen sliding scale   SubCutaneous three times a day before meals  insulin lispro (ADMELOG) corrective regimen sliding scale   SubCutaneous at bedtime  isosorbide   mononitrate ER Tablet (IMDUR) 30 milliGRAM(s) Oral daily    MEDICATIONS  (PRN):  dextrose Oral Gel 15 Gram(s) Oral once PRN Blood Glucose LESS THAN 70 milliGRAM(s)/deciliter      Objective:    Vitals: Vital Signs Last 24 Hrs  T(C): 36.8 (04-18-24 @ 06:00), Max: 37.1 (04-17-24 @ 19:36)  T(F): 98.2 (04-18-24 @ 06:00), Max: 98.8 (04-17-24 @ 19:36)  HR: 73 (04-18-24 @ 06:00) (73 - 89)  BP: 171/73 (04-18-24 @ 06:00) (123/64 - 171/73)  BP(mean): --  RR: 20 (04-18-24 @ 06:00) (18 - 20)  SpO2: 100% (04-18-24 @ 06:00) (99% - 100%)                I&O's Summary    16 Apr 2024 07:01  -  17 Apr 2024 07:00  --------------------------------------------------------  IN: 0 mL / OUT: 1900 mL / NET: -1900 mL    17 Apr 2024 07:01  -  18 Apr 2024 06:49  --------------------------------------------------------  IN: 0 mL / OUT: 2000 mL / NET: -2000 mL        PHYSICAL EXAM:  GENERAL: NAD  HEAD:  Atraumatic, Normocephalic  EYES: EOMI, conjunctiva and sclera clear  CHEST/LUNG: Clear to auscultation bilaterally; No rales, rhonchi, wheezing, or rubs  HEART: Regular rate and rhythm; No murmurs, rubs, or gallops  ABDOMEN: Soft, Nontender, Nondistended;   SKIN: No rashes or lesions  NERVOUS SYSTEM:  Alert & Oriented X3, no focal deficits    LABS:                        12.4   34.49 )-----------( 51       ( 17 Apr 2024 09:48 )             35.4                         12.6   34.33 )-----------( 49       ( 17 Apr 2024 04:56 )             36.2                         12.3   29.09 )-----------( 54       ( 16 Apr 2024 05:35 )             34.6     04-17    143  |  110<H>  |  27<H>  ----------------------------<  93  3.5   |  21<L>  |  1.06  04-17    144  |  108<H>  |  30<H>  ----------------------------<  8<LL>  3.5   |  17<L>  |  1.16  04-16    137  |  103  |  39<H>  ----------------------------<  125<H>  3.3<L>   |  18<L>  |  1.86<H>    Ca    8.3<L>      17 Apr 2024 09:48  Ca    8.4      17 Apr 2024 04:56  Ca    7.8<L>      16 Apr 2024 05:35  Phos  1.7     04-17  Mg     2.00     04-17    TPro  5.8<L>  /  Alb  2.7<L>  /  TBili  0.3  /  DBili  x   /  AST  27  /  ALT  13  /  AlkPhos  192<H>  04-17  TPro  5.4<L>  /  Alb  2.6<L>  /  TBili  <0.2  /  DBili  x   /  AST  36<H>  /  ALT  10  /  AlkPhos  114  04-16    CAPILLARY BLOOD GLUCOSE      POCT Blood Glucose.: 142 mg/dL (17 Apr 2024 22:00)  POCT Blood Glucose.: 154 mg/dL (17 Apr 2024 17:47)  POCT Blood Glucose.: 169 mg/dL (17 Apr 2024 12:41)  POCT Blood Glucose.: 105 mg/dL (17 Apr 2024 09:14)        Urinalysis Basic - ( 17 Apr 2024 09:48 )    Color: x / Appearance: x / SG: x / pH: x  Gluc: 93 mg/dL / Ketone: x  / Bili: x / Urobili: x   Blood: x / Protein: x / Nitrite: x   Leuk Esterase: x / RBC: x / WBC x   Sq Epi: x / Non Sq Epi: x / Bacteria: x          RADIOLOGY & ADDITIONAL TESTS:    Imaging Personally Reviewed:  [x ] YES  [ ] NO    Consultants involved in case:   Consultant(s) Notes Reviewed:  [ x] YES  [ ] NO:   Care Discussed with Consultants/Other Providers [x ] YES  [ ] NO         ***************************************************************  Terry Villasenor, PGY1  Internal Medicine   Preferred contact via [employer] Teams   ***************************************************************    CHENG KIM  83y  MRN: [id number]    Patient is a 83y old  Female who presents with a chief complaint of Sepsis (17 Apr 2024 07:29)      Interval/Overnight Events: no events ON.     Subjective: Pt pleasant resting calmly.     MEDICATIONS  (STANDING):  atorvastatin 10 milliGRAM(s) Oral at bedtime  cefTRIAXone   IVPB      cefTRIAXone   IVPB 2000 milliGRAM(s) IV Intermittent every 24 hours  chlorhexidine 2% Cloths 1 Application(s) Topical daily  clopidogrel Tablet 75 milliGRAM(s) Oral daily  dextrose 10% Bolus 125 milliLiter(s) IV Bolus once  dextrose 5%. 1000 milliLiter(s) (100 mL/Hr) IV Continuous <Continuous>  dextrose 5%. 1000 milliLiter(s) (50 mL/Hr) IV Continuous <Continuous>  dextrose 50% Injectable 25 Gram(s) IV Push once  dextrose 50% Injectable 12.5 Gram(s) IV Push once  glucagon  Injectable 1 milliGRAM(s) IntraMuscular once  insulin lispro (ADMELOG) corrective regimen sliding scale   SubCutaneous three times a day before meals  insulin lispro (ADMELOG) corrective regimen sliding scale   SubCutaneous at bedtime  isosorbide   mononitrate ER Tablet (IMDUR) 30 milliGRAM(s) Oral daily    MEDICATIONS  (PRN):  dextrose Oral Gel 15 Gram(s) Oral once PRN Blood Glucose LESS THAN 70 milliGRAM(s)/deciliter      Objective:    Vitals: Vital Signs Last 24 Hrs  T(C): 36.8 (04-18-24 @ 06:00), Max: 37.1 (04-17-24 @ 19:36)  T(F): 98.2 (04-18-24 @ 06:00), Max: 98.8 (04-17-24 @ 19:36)  HR: 73 (04-18-24 @ 06:00) (73 - 89)  BP: 171/73 (04-18-24 @ 06:00) (123/64 - 171/73)  BP(mean): --  RR: 20 (04-18-24 @ 06:00) (18 - 20)  SpO2: 100% (04-18-24 @ 06:00) (99% - 100%)                I&O's Summary    16 Apr 2024 07:01  -  17 Apr 2024 07:00  --------------------------------------------------------  IN: 0 mL / OUT: 1900 mL / NET: -1900 mL    17 Apr 2024 07:01  -  18 Apr 2024 06:49  --------------------------------------------------------  IN: 0 mL / OUT: 2000 mL / NET: -2000 mL        PHYSICAL EXAM:  GENERAL: NAD  HEAD:  Atraumatic, Normocephalic  EYES: EOMI, conjunctiva and sclera clear  CHEST/LUNG: Clear to auscultation bilaterally; No rales, rhonchi, wheezing, or rubs  HEART: Regular rate and rhythm; No murmurs, rubs, or gallops  ABDOMEN: Soft, Nontender, Nondistended;   SKIN: No rashes or lesions  NERVOUS SYSTEM:  Alert & Oriented X3, no focal deficits    LABS:                        12.4   34.49 )-----------( 51       ( 17 Apr 2024 09:48 )             35.4                         12.6   34.33 )-----------( 49       ( 17 Apr 2024 04:56 )             36.2                         12.3   29.09 )-----------( 54       ( 16 Apr 2024 05:35 )             34.6     04-17    143  |  110<H>  |  27<H>  ----------------------------<  93  3.5   |  21<L>  |  1.06  04-17    144  |  108<H>  |  30<H>  ----------------------------<  8<LL>  3.5   |  17<L>  |  1.16  04-16    137  |  103  |  39<H>  ----------------------------<  125<H>  3.3<L>   |  18<L>  |  1.86<H>    Ca    8.3<L>      17 Apr 2024 09:48  Ca    8.4      17 Apr 2024 04:56  Ca    7.8<L>      16 Apr 2024 05:35  Phos  1.7     04-17  Mg     2.00     04-17    TPro  5.8<L>  /  Alb  2.7<L>  /  TBili  0.3  /  DBili  x   /  AST  27  /  ALT  13  /  AlkPhos  192<H>  04-17  TPro  5.4<L>  /  Alb  2.6<L>  /  TBili  <0.2  /  DBili  x   /  AST  36<H>  /  ALT  10  /  AlkPhos  114  04-16    CAPILLARY BLOOD GLUCOSE      POCT Blood Glucose.: 142 mg/dL (17 Apr 2024 22:00)  POCT Blood Glucose.: 154 mg/dL (17 Apr 2024 17:47)  POCT Blood Glucose.: 169 mg/dL (17 Apr 2024 12:41)  POCT Blood Glucose.: 105 mg/dL (17 Apr 2024 09:14)        Urinalysis Basic - ( 17 Apr 2024 09:48 )    Color: x / Appearance: x / SG: x / pH: x  Gluc: 93 mg/dL / Ketone: x  / Bili: x / Urobili: x   Blood: x / Protein: x / Nitrite: x   Leuk Esterase: x / RBC: x / WBC x   Sq Epi: x / Non Sq Epi: x / Bacteria: x          RADIOLOGY & ADDITIONAL TESTS:    Imaging Personally Reviewed:  [x ] YES  [ ] NO    Consultants involved in case:   Consultant(s) Notes Reviewed:  [ x] YES  [ ] NO:   Care Discussed with Consultants/Other Providers [x ] YES  [ ] NO

## 2024-04-18 NOTE — PROGRESS NOTE ADULT - PROBLEM SELECTOR PLAN 7
Diet: consistent carb   DVT: heparin subq  Dispo: pedning medically optimization Diet: consistent carb   DVT: heparin subq  Dispo: pending medical optimization

## 2024-04-18 NOTE — DISCHARGE NOTE NURSING/CASE MANAGEMENT/SOCIAL WORK - NSDCFUADDAPPT_GEN_ALL_CORE_FT
APPTS ARE READY TO BE MADE: [X ] YES    Best Family or Patient Contact (if needed):    Additional Information about above appointments (if needed):    1: Internal Medicine   2: Urology   3:     Met with the patient and family member face to face, however their family member advised they prefer to coordinate the care on their own.   Other comments or requests:

## 2024-04-18 NOTE — PROGRESS NOTE ADULT - ASSESSMENT
HPI: 84 Y/O F w/ PMH of DM, HTN, CVA w/ LT sided deficits presents to ER w/ fever. Per daughter has increasing lethargy, elevated blood sugar, episode of nausea today w/ vomiting prompting call to EMS. Associated chills and subjective fever. No hx of urological conditions/surgery. Denies dizziness, headache, chest pain, shortness of breath, dysuria or hematuria. Noted to be febrile to 104.5 rectal. During ER course no serum WBC count, Bandemia 25K, Cr 1.48, K+ 2.9, elevated lactate 3.7, UA demonstrating positive Leukocytes and Nitrites. CT concerning for Extensive right retroperitoneal stranding/inflammatory change felt to be secondary to possible infection/inflammation of the right kidney. Abrupt changing caliber of the right ureter as described above at the level of the mid right ureter; etiology indeterminate. It appears to be a stricture with resultant upstream hydronephrosis. Given IV Vancomycin, Zosyn, Tylenol and Potassium repleted.      No acute urological intervention  Recommend CT abd/pelvis non-contrast assess for obstruction at RT mid RT ureter which should catch delayed phase to see if contrast is going past area of questionable stricture -> delayed phase in the R kidney did not catch any contrast in the ureter likely because of duration from original scan, would continue with conservative management  Patient is unlikely to have ureteral stricture given that she has never had ureteral manipulation/instrumentation and no history of radiation  Mild hydronephrosis in R kidney can be seen transiently in setting of pyelonephritis altering peristalsis of the ureter  Broad spectrum antibiotics -> would broaden patient to zosyn for GNR bacteremia   If patient does not improve with conservative measures would plan to reimage and then if patient continues to have hydronephrosis would consider intervention with PCN vs stent (likely favor PCN given concern for possible stricture)  ABX per primary team  Trend CR  Follow up urine CX  [place] Maximum Drainage  Discussed with Dr. Campbell
Pt is an 82 yo woman with PMH of DM2, HTN, CVA w/ LT sided deficits, recurrent UTIs, who presents with sepsis in the setting of UTI. 

## 2024-04-18 NOTE — PROGRESS NOTE ADULT - ATTENDING COMMENTS
Agree with assessment and plan.  Trend creatinine  Await culture and sensitivities
83M w/ type 2 DM (on SGLT2), HTN, h/o CVA (L sided weakness), recurrent UTI, presenting with severe sepsis in setting of pyelonephritis w/ E coli bacteremia and CT w/ possible ureteral stricture. Symptoms improved and leukocytosis is improving today.    - F/up blood and urine cultures - E coli sensitive to CTX and cipro  - Ceftriaxone 2g IV daily, transition to cipro on discharge to complete 7d course  - Urology signed off, will need outpatient f/up for lasix scan   - Trend Cr daily - now improved  - Trend CBC daily given leukocytosis and thrombocytopenia, likely in setting of sepsis  - TTE with normal systolic and diastolic function  - Dyspnea resolved  - PT evaluated patient and recommend home PT  - Plan for discharge home today, patient and family at bedside in agreement.    Seen with mandarin 
83M w/ type 2 DM (on SGLT2), HTN, h/o CVA (L sided weakness), recurrent UTI, presenting with severe sepsis in setting of pyelonephritis w/ E coli bacteremia and CT w/ possible ureteral stricture. Course c/b episode of tachypnea and dyspnea this afternoon with SpO2 low/mid 90s, possibly volume overload after fluid resuscitation vs aspiration event    - F/up blood and urine cultures  - Ceftriaxone 2g IV daily  - Urology following, recommending medical management of infection for now  - CXR to evaluate new dyspnea  - Stop IVF  - Obtain TTE  - Severe hypokalemia - replete    Seen with mandarin    Discussed with patient's granddaughter at bedside    Time-based billing (NON-critical care).     50 minutes spent on total encounter; more than 50% of the visit was spent counseling and / or coordinating care by the attending physician.  The necessity of the time spent during the encounter on this date of service was due to:     documentation in [place], reviewing chart and coordinating care with patient/resident and interdisciplinary staff (such as , social workers, etc) as well as reviewing vitals, laboratory data, radiology, medication list, consultants' recommendations and prior records. Interventions were performed as documented above.
83M w/ type 2 DM (on SGLT2), HTN, h/o CVA (L sided weakness), recurrent UTI, presenting with severe sepsis in setting of pyelonephritis w/ E coli bacteremia and CT w/ possible ureteral stricture - course c/b episode of tachypnea/dyspnea on 4/15 possibly due to mild volume overload from resuscitation, now improved;     LAYLA worsening this morning - pre-renal vs ATN from sepsis/pyelo/IV contrast vs post-renal related to ?ureteral stricture    - F/up blood and urine cultures - E coli awaiting sensitivities  - Ceftriaxone 2g IV daily  - Urology following, recommending medical management of infection for now  - Trend Cr daily  - F/up TTE  - PT evaluation    Seen with mandarin  [id number]  Discussed with patient's granddaughter at bedside    Time-based billing (NON-critical care).     35 minutes spent on total encounter; more than 50% of the visit was spent counseling and / or coordinating care by the attending physician.  The necessity of the time spent during the encounter on this date of service was due to:     documentation in [place], reviewing chart and coordinating care with patient/resident and interdisciplinary staff (such as , social workers, etc) as well as reviewing vitals, laboratory data, radiology, medication list, consultants' recommendations and prior records. Interventions were performed as documented above.
83M w/ type 2 DM (on SGLT2), HTN, h/o CVA (L sided weakness), recurrent UTI, presenting with severe sepsis in setting of pyelonephritis w/ E coli bacteremia and CT w/ possible ureteral stricture.    Rising leukocytosis noted, although all other markers of sepsis and infection are improving.    - F/up blood and urine cultures - E coli sensitive to CTX and cipro  - Ceftriaxone 2g IV daily, transition to cipro on discharge to complete 7d course  - Urology signed off, will need outpatient f/up for lasix scan   - Trend Cr daily - now improved  - Trend CBC daily given leukocytosis and thrombocytopenia, likely in setting of sepsis  - TTE with normal systolic and diastolic function  - Give lasix 20 IV x1 today given persistent dyspnea and CXR with pulmonary vascular congestion  - PT evaluation for discharge planning pending    Seen with mandarin      Time-based billing (NON-critical care).     35 minutes spent on total encounter; more than 50% of the visit was spent counseling and / or coordinating care by the attending physician.  The necessity of the time spent during the encounter on this date of service was due to:     documentation in [place], reviewing chart and coordinating care with patient/resident and interdisciplinary staff (such as , social workers, etc) as well as reviewing vitals, laboratory data, radiology, medication list, consultants' recommendations and prior records. Interventions were performed as documented above.

## 2024-04-18 NOTE — PROGRESS NOTE ADULT - PROBLEM SELECTOR PROBLEM 5
Acute kidney injury superimposed on CKD What Type Of Note Output Would You Prefer (Optional)?: Standard Output How Severe Is Your Skin Lesion?: mild Has Your Skin Lesion Been Treated?: not been treated Is This A New Presentation, Or A Follow-Up?: Skin Lesions Which Family Member (Optional)?: Uncle

## 2024-04-18 NOTE — DISCHARGE NOTE NURSING/CASE MANAGEMENT/SOCIAL WORK - PATIENT PORTAL LINK FT
You can access the FollowMyHealth Patient Portal offered by [facility] by registering at the following website: [email or web address]. By joining [facility]s FollowMyHealth portal, you will also be able to view your health information using other applications (apps) compatible with our system.

## 2024-04-18 NOTE — PROGRESS NOTE ADULT - PROBLEM SELECTOR PLAN 2
CT showing R pyelonephritis  Urology consulted in ED --> if patient continues to have hydronephrosis would consider intervention with PCN vs stent given constricted appearance of R ureter  -No acute mgmt per urology     Plan: Treat as above CT showing R pyelonephritis  Urology consulted in ED --> if patient continues to have hydronephrosis would consider intervention with PCN vs stent given constricted appearance of R ureter  -can follow with urology outpt     Plan: Treat as above

## 2024-04-18 NOTE — PROGRESS NOTE ADULT - PROBLEM SELECTOR PLAN 3
Home regimen: HCTZ 12.5mg qd, Imdur ER 30mg qd, metoprolol succinate 25mg qd, nifedipine ER 60mg qd, Losartan 50mg qd    Plan:  - will resume AHs as tolerated in context of sepsis

## 2024-04-18 NOTE — PROGRESS NOTE ADULT - PROBLEM SELECTOR PLAN 5
sCr 1.48-->1.52, unclear baseline  - most likely prerenal/intrarenal in etiology   - continue maintenance fluids and trend sCr  - No mgmt change per urology

## 2024-04-18 NOTE — PROVIDER CONTACT NOTE (OTHER) - ACTION/TREATMENT ORDERED:
Isosorbide given as ordered. Pt resting comfortably in bed. Will continue to monitor
Aware of  above recommendations, will continue to monitor.
MD notified
pt seen and examined by MD. Pt placed on O2 inhalation 2LPM via NC, respiratory txt given by RT. no further orders made. Kept HOB elevated. Will continue to monitor

## 2024-04-18 NOTE — PROVIDER CONTACT NOTE (OTHER) - BACKGROUND
Patient with DX of Sepsis, hx of P Dm type 2, hypertension.
Pt admitted for sepsis, hx of HTN, DM
pt  admitted with n/v sepsis workup
Pt admitted for sepsis 2/2 UTI, Hx of DM, CVA, HTN

## 2024-04-18 NOTE — PROVIDER CONTACT NOTE (OTHER) - ASSESSMENT
Pt alert and responsive, no signs of acute distress. denies discomfort at this time
pt 96% on RA, feeling sob and having new expiratory wheeze on assessment
Patient with no complaints.
Pt alert and responsive, noted to be tachypneic, uses abdominal muscle, with expiratory wheezing

## 2024-04-19 PROBLEM — E11.9 TYPE 2 DIABETES MELLITUS WITHOUT COMPLICATIONS: Chronic | Status: ACTIVE | Noted: 2024-04-15

## 2024-04-19 PROBLEM — I10 ESSENTIAL (PRIMARY) HYPERTENSION: Chronic | Status: ACTIVE | Noted: 2024-04-15

## 2024-04-19 PROBLEM — I63.9 CEREBRAL INFARCTION, UNSPECIFIED: Chronic | Status: ACTIVE | Noted: 2024-04-15

## 2024-04-21 LAB
CULTURE RESULTS: SIGNIFICANT CHANGE UP
CULTURE RESULTS: SIGNIFICANT CHANGE UP
SPECIMEN SOURCE: SIGNIFICANT CHANGE UP
SPECIMEN SOURCE: SIGNIFICANT CHANGE UP

## 2024-04-25 PROBLEM — Z00.00 ENCOUNTER FOR PREVENTIVE HEALTH EXAMINATION: Status: ACTIVE | Noted: 2024-04-25

## 2024-04-28 ENCOUNTER — TRANSCRIPTION ENCOUNTER (OUTPATIENT)
Age: 84
End: 2024-04-28

## 2024-05-14 NOTE — PROGRESS NOTE ADULT - PROBLEM/PLAN-5
DISPLAY PLAN FREE TEXT
Wash with warm water and gentle soap like [place]'s baby shampoo.  Apply a little bit of antibiotic ointment like bacitracin or Polysporin to the area.  Leave it open.  No need for Band-Aids.    Cool compress or ice to help with swelling and pain.  Tylenol/ibuprofen as needed for pain.    If having signs of infection like redness or pus drainage then get her re-evaluated right away.    The laceration should heal over the next 1 week and scab over.  After the scab has fallen off you can use a scar reducing cream like Mederma (for kids) daily to help lessen the scarring.   
DISPLAY PLAN FREE TEXT

## 2024-06-25 ENCOUNTER — APPOINTMENT (OUTPATIENT)
Dept: UROLOGY | Facility: CLINIC | Age: 84
End: 2024-06-25
Payer: MEDICARE

## 2024-06-25 VITALS
WEIGHT: 119 LBS | BODY MASS INDEX: 21.9 KG/M2 | RESPIRATION RATE: 16 BRPM | DIASTOLIC BLOOD PRESSURE: 69 MMHG | OXYGEN SATURATION: 98 % | SYSTOLIC BLOOD PRESSURE: 160 MMHG | TEMPERATURE: 97.6 F | HEART RATE: 88 BPM | HEIGHT: 62 IN

## 2024-06-25 DIAGNOSIS — A41.9 SEPSIS, UNSPECIFIED ORGANISM: ICD-10-CM

## 2024-06-25 DIAGNOSIS — N39.0 SEPSIS, UNSPECIFIED ORGANISM: ICD-10-CM

## 2024-06-25 DIAGNOSIS — Z78.9 OTHER SPECIFIED HEALTH STATUS: ICD-10-CM

## 2024-06-25 PROCEDURE — G2211 COMPLEX E/M VISIT ADD ON: CPT

## 2024-06-25 PROCEDURE — 99214 OFFICE O/P EST MOD 30 MIN: CPT

## 2024-06-25 RX ORDER — GLIPIZIDE 5 MG/1
5 TABLET, FILM COATED, EXTENDED RELEASE ORAL
Refills: 0 | Status: ACTIVE | COMMUNITY

## 2024-06-25 RX ORDER — ISOSORBIDE MONONITRATE 30 MG/1
30 TABLET, EXTENDED RELEASE ORAL
Refills: 0 | Status: ACTIVE | COMMUNITY

## 2024-06-25 RX ORDER — LINAGLIPTIN 5 MG/1
5 TABLET, FILM COATED ORAL
Refills: 0 | Status: ACTIVE | COMMUNITY

## 2024-06-25 RX ORDER — CALCITRIOL 0.25 UG/1
0.25 CAPSULE, LIQUID FILLED ORAL
Refills: 0 | Status: ACTIVE | COMMUNITY

## 2024-06-25 RX ORDER — METOPROLOL SUCCINATE 25 MG/1
25 TABLET, EXTENDED RELEASE ORAL
Refills: 0 | Status: ACTIVE | COMMUNITY

## 2024-06-25 RX ORDER — SEMAGLUTIDE 1.34 MG/ML
INJECTION, SOLUTION SUBCUTANEOUS
Refills: 0 | Status: ACTIVE | COMMUNITY

## 2024-06-25 RX ORDER — HYDROCHLOROTHIAZIDE 12.5 MG/1
12.5 TABLET ORAL
Refills: 0 | Status: ACTIVE | COMMUNITY

## 2024-06-25 RX ORDER — ATORVASTATIN CALCIUM 10 MG/1
10 TABLET, FILM COATED ORAL
Refills: 0 | Status: ACTIVE | COMMUNITY

## 2024-06-25 RX ORDER — EMPAGLIFLOZIN 10 MG/1
10 TABLET, FILM COATED ORAL
Refills: 0 | Status: ACTIVE | COMMUNITY

## 2024-06-25 RX ORDER — CLOPIDOGREL 75 MG/1
75 TABLET, FILM COATED ORAL
Refills: 0 | Status: ACTIVE | COMMUNITY

## 2024-06-25 RX ORDER — ICOSAPENT ETHYL 500 MG/1
0.5 CAPSULE ORAL
Refills: 0 | Status: ACTIVE | COMMUNITY

## 2024-06-25 RX ORDER — LOSARTAN POTASSIUM 50 MG/1
50 TABLET, FILM COATED ORAL
Refills: 0 | Status: ACTIVE | COMMUNITY

## 2024-06-25 RX ORDER — MULTI VITAMIN/MINERAL SUPPLEMENT WITH ASCORBIC ACID, NIACIN, PYRIDOXINE, PANTOTHENIC ACID, FOLIC ACID, RIBOFLAVIN, THIAMIN, BIOTIN, COBALAMIN AND ZINC. 60; 20; 12.5; 10; 10; 1.7; 1.5; 1; .3; .006 MG/1; MG/1; MG/1; MG/1; MG/1; MG/1; MG/1; MG/1; MG/1; MG/1
TABLET, COATED ORAL
Refills: 0 | Status: ACTIVE | COMMUNITY

## 2024-06-25 RX ORDER — NIFEDIPINE 60 MG/1
60 TABLET, EXTENDED RELEASE ORAL
Refills: 0 | Status: ACTIVE | COMMUNITY

## 2024-06-25 RX ORDER — DOCUSATE SODIUM 100 MG/1
100 CAPSULE ORAL
Refills: 0 | Status: ACTIVE | COMMUNITY

## 2024-06-26 ENCOUNTER — APPOINTMENT (OUTPATIENT)
Dept: OPHTHALMOLOGY | Facility: CLINIC | Age: 84
End: 2024-06-26
Payer: MEDICARE

## 2024-06-26 ENCOUNTER — NON-APPOINTMENT (OUTPATIENT)
Age: 84
End: 2024-06-26

## 2024-06-26 LAB
APPEARANCE: CLEAR
BACTERIA: ABNORMAL /HPF
BILIRUBIN URINE: NEGATIVE
BLOOD URINE: NEGATIVE
CAST: 2 /LPF
COLOR: NORMAL
EPITHELIAL CELLS: 5 /HPF
GLUCOSE QUALITATIVE U: NEGATIVE MG/DL
KETONES URINE: NEGATIVE MG/DL
LEUKOCYTE ESTERASE URINE: ABNORMAL
MICROSCOPIC-UA: NORMAL
NITRITE URINE: POSITIVE
PH URINE: 6.5
PROTEIN URINE: NORMAL MG/DL
RED BLOOD CELLS URINE: 0 /HPF
SPECIFIC GRAVITY URINE: 1.02
UROBILINOGEN URINE: 0.2 MG/DL
WHITE BLOOD CELLS URINE: 14 /HPF

## 2024-06-26 PROCEDURE — 92004 COMPRE OPH EXAM NEW PT 1/>: CPT

## 2024-06-28 DIAGNOSIS — N12 TUBULO-INTERSTITIAL NEPHRITIS, NOT SPECIFIED AS ACUTE OR CHRONIC: ICD-10-CM

## 2024-07-01 LAB — BACTERIA UR CULT: ABNORMAL

## 2024-08-03 ENCOUNTER — NON-APPOINTMENT (OUTPATIENT)
Age: 84
End: 2024-08-03

## 2024-08-14 ENCOUNTER — NON-APPOINTMENT (OUTPATIENT)
Age: 84
End: 2024-08-14

## 2024-09-13 NOTE — PHYSICAL THERAPY INITIAL EVALUATION ADULT - ASR WT BEARING STATUS EVAL
- No controlled at this time   - Ordered Ipratropium monotherapy   - Pt did not like Flonase and Astelin   - Declined AIT   - Continue oral antihistamines  - Will continue to monitor and reassess    no weight-bearing restrictions

## 2025-04-11 ENCOUNTER — INPATIENT (INPATIENT)
Facility: HOSPITAL | Age: 85
LOS: 2 days | Discharge: HOME | End: 2025-04-14
Attending: INTERNAL MEDICINE | Admitting: INTERNAL MEDICINE
Payer: MEDICARE

## 2025-04-11 VITALS
SYSTOLIC BLOOD PRESSURE: 184 MMHG | DIASTOLIC BLOOD PRESSURE: 72 MMHG | OXYGEN SATURATION: 96 % | TEMPERATURE: 98 F | HEART RATE: 79 BPM | RESPIRATION RATE: 18 BRPM

## 2025-04-11 PROCEDURE — 99285 EMERGENCY DEPT VISIT HI MDM: CPT

## 2025-04-11 RX ORDER — ACETAMINOPHEN 500 MG/5ML
1000 LIQUID (ML) ORAL ONCE
Refills: 0 | Status: COMPLETED | OUTPATIENT
Start: 2025-04-11 | End: 2025-04-12

## 2025-04-11 RX ORDER — OXYCODONE HYDROCHLORIDE 30 MG/1
5 TABLET ORAL ONCE
Refills: 0 | Status: DISCONTINUED | OUTPATIENT
Start: 2025-04-11 | End: 2025-04-11

## 2025-04-11 RX ORDER — LIDOCAINE HYDROCHLORIDE 20 MG/ML
1 JELLY TOPICAL ONCE
Refills: 0 | Status: COMPLETED | OUTPATIENT
Start: 2025-04-11 | End: 2025-04-11

## 2025-04-11 NOTE — ED ADULT TRIAGE NOTE - CHIEF COMPLAINT QUOTE
Pt arrives via EMS accompanied by her daughter from home. Pt Mandarin speaking, requests her daughter to translate. Pt  c/o lower back pain x 1 week with pain worsening over the past 2-3 days. Pt had a slip and fall 2 weeks ago in her dining room and did not seek medical tx s.p fall but this past Monday did see her PMD and received an Xray and was told no fx is present. Pt now unable to ambulate due to pain. Pt has hx of HTN, DM and chronic UTIs. Pt also s/p stroke 10 years ago with residual L sided deficit. Pt denies CP/SOB/Nausea/Dizziness. EMS: [id number] en route to ER.

## 2025-04-11 NOTE — ED PROVIDER NOTE - ATTENDING CONTRIBUTION TO CARE
I performed a face-to-face evaluation of the patient and performed a history and physical examination. I agree with the history and physical examination. If this was a PA visit, I personally saw the patient with the PA and performed a substantive portion of the visit including all aspects of the medical decision making.    Recent treatment w/ Cipro for UTI. H/o fall. After fall, had new (since 8/24) L4 compression fx. C/o back pain interfering w/ ability to walk 2/2 pain. No change in baseline leg strength. Check CT. Pain meds. Likely admit for PT and back brace.

## 2025-04-11 NOTE — ED ADULT NURSE NOTE - OBJECTIVE STATEMENT
Pt received to spot 7a. Pt is an 84 year old female with Hx of CVA - L sided residual weakness, HTN, DM2. Pt presented to ED c/o lower back pain. Pt primarily Mandarin speaking, requesting daughter to translate. States she fell earlier this week, denies LOC, head strike - endorsing increased leg pain and lower back pain since fall. Unable to ambulate as well as previously able to with cane d/t pain. Denies chest pain, SOB, headache, dizziness, abdominal pain, n/v/d, urinary symptoms, fevers/chills, numbness/tingling.   Pt is A&Ox4, ambulatory with cane, 1 assist. neuro/sensory intact. airway patent, speaking clearly in full sentences. breathing is even and unlabored. abdomen is soft, nontender, nondistended. no edema noted. skin is intact. spontaneous movement of all extremities. Comfort measures provided. stretcher set in lowest position, call bell within reach, safety maintained. No acute distress noted. Awaiting orders.

## 2025-04-11 NOTE — ED ADULT NURSE NOTE - NSFALLRISKINTERV_ED_ALL_ED

## 2025-04-11 NOTE — ED PROVIDER NOTE - CLINICAL SUMMARY MEDICAL DECISION MAKING FREE TEXT BOX
Lorri: Recent treatment w/ Cipro for UTI. H/o fall. After fall, had new (since 8/24) L4 compression fx. C/o back pain interfering w/ ability to walk 2/2 pain. No change in baseline leg strength. Check CT. Pain meds. Likely admit for PT and back brace. Flavio PGY2: 84-year-old female, history hypertension, diabetes, chronic UTIs, on Lasix for swelling, CVA with residual right-sided deficit, presents to ED with complaints of worsening low back pain over the past 2 weeks.  Per daughter at bedside, patient had mechanical fall 2 weeks ago, was not evaluated medically at the time.  Has had continued low back pain, seen 4 days ago at PCP, had x-ray showing L4 compression fracture prescribed oxycodone and tramadol.  Daughter reports patient has only taken 1 tablet of tramadol has otherwise been taking just Tylenol.  Over the past few days patient with worsening pain and difficulty ambulating specifically in the evening.  Daughter reports for the day patient able to ambulate well but patient has worsening pain when lying flat and trying to sleep.  Denies new numbness\weakness\tingling, no bowel\bladder incontinence, saddle anesthesia.  Vital signs stable.  Patient elderly appearing, no acute distress, heart regular rate and rhythm, lungs clear, abdomen soft and nontender, midline and paraspinal tenderness noted to lower lumbar spine, 5 out of 5 strength to bilateral lower extremities, distal neurovascular intact.  Pain likely secondary to known fracture and likely under medication of pain medication.  No red flag neurologic symptoms.  Will obtain CT L-spine to assess for severity of compression fracture or complications including retropulsion.  Will give analgesics, reassess.    Lorri: Recent treatment w/ Cipro for UTI. H/o fall. After fall, had new (since 8/24) L4 compression fx. C/o back pain interfering w/ ability to walk 2/2 pain. No change in baseline leg strength. Check CT. Pain meds. Likely admit for PT and back brace.

## 2025-04-11 NOTE — ED ADULT NURSE NOTE - CHIEF COMPLAINT QUOTE
Pt arrives via EMS accompanied by her daughter from home. Pt Mandarin speaking, requests her daughter to translate. Pt  c/o lower back pain x 1 week with pain worsening over the past 2-3 days. Pt had a slip and fall 2 weeks ago in her dining room and did not seek medical tx s.p fall but this past Monday did see her PMD and received an Xray and was told no fx is present. Pt now unable to ambulate due to pain. Pt has hx of HTN, DM and chronic UTIs. Pt also s/p stroke 10 years ago with residual L sided deficit. Pt denies CP/SOB/Nausea/Dizziness. EMS: [id number] en route to ER.

## 2025-04-12 DIAGNOSIS — E78.5 HYPERLIPIDEMIA, UNSPECIFIED: ICD-10-CM

## 2025-04-12 DIAGNOSIS — E87.6 HYPOKALEMIA: ICD-10-CM

## 2025-04-12 DIAGNOSIS — S32.000A WEDGE COMPRESSION FRACTURE OF UNSPECIFIED LUMBAR VERTEBRA, INITIAL ENCOUNTER FOR CLOSED FRACTURE: ICD-10-CM

## 2025-04-12 DIAGNOSIS — Z29.9 ENCOUNTER FOR PROPHYLACTIC MEASURES, UNSPECIFIED: ICD-10-CM

## 2025-04-12 DIAGNOSIS — N18.9 CHRONIC KIDNEY DISEASE, UNSPECIFIED: ICD-10-CM

## 2025-04-12 DIAGNOSIS — M79.89 OTHER SPECIFIED SOFT TISSUE DISORDERS: ICD-10-CM

## 2025-04-12 DIAGNOSIS — I10 ESSENTIAL (PRIMARY) HYPERTENSION: ICD-10-CM

## 2025-04-12 DIAGNOSIS — N39.0 URINARY TRACT INFECTION, SITE NOT SPECIFIED: ICD-10-CM

## 2025-04-12 DIAGNOSIS — M54.50 LOW BACK PAIN, UNSPECIFIED: ICD-10-CM

## 2025-04-12 DIAGNOSIS — Z86.73 PERSONAL HISTORY OF TRANSIENT ISCHEMIC ATTACK (TIA), AND CEREBRAL INFARCTION WITHOUT RESIDUAL DEFICITS: ICD-10-CM

## 2025-04-12 DIAGNOSIS — E11.9 TYPE 2 DIABETES MELLITUS WITHOUT COMPLICATIONS: ICD-10-CM

## 2025-04-12 DIAGNOSIS — M81.0 AGE-RELATED OSTEOPOROSIS WITHOUT CURRENT PATHOLOGICAL FRACTURE: ICD-10-CM

## 2025-04-12 LAB
ALBUMIN SERPL ELPH-MCNC: 3.7 G/DL — SIGNIFICANT CHANGE UP (ref 3.3–5)
ALBUMIN SERPL ELPH-MCNC: 4.1 G/DL — SIGNIFICANT CHANGE UP (ref 3.3–5)
ALP SERPL-CCNC: 236 U/L — HIGH (ref 40–120)
ALP SERPL-CCNC: 87 U/L — SIGNIFICANT CHANGE UP (ref 40–120)
ALT FLD-CCNC: 22 U/L — SIGNIFICANT CHANGE UP (ref 4–33)
ALT FLD-CCNC: 374 U/L — HIGH (ref 4–33)
ANION GAP SERPL CALC-SCNC: 11 MMOL/L — SIGNIFICANT CHANGE UP (ref 7–14)
ANION GAP SERPL CALC-SCNC: 14 MMOL/L — SIGNIFICANT CHANGE UP (ref 7–14)
APTT BLD: 29.5 SEC — SIGNIFICANT CHANGE UP (ref 24.5–35.6)
AST SERPL-CCNC: 39 U/L — HIGH (ref 4–32)
AST SERPL-CCNC: 834 U/L — HIGH (ref 4–32)
BASOPHILS # BLD AUTO: 0.03 K/UL — SIGNIFICANT CHANGE UP (ref 0–0.2)
BASOPHILS NFR BLD AUTO: 0.4 % — SIGNIFICANT CHANGE UP (ref 0–2)
BILIRUB SERPL-MCNC: 0.4 MG/DL — SIGNIFICANT CHANGE UP (ref 0.2–1.2)
BILIRUB SERPL-MCNC: 0.4 MG/DL — SIGNIFICANT CHANGE UP (ref 0.2–1.2)
BUN SERPL-MCNC: 19 MG/DL — SIGNIFICANT CHANGE UP (ref 7–23)
BUN SERPL-MCNC: 27 MG/DL — HIGH (ref 7–23)
CALCIUM SERPL-MCNC: 9.2 MG/DL — SIGNIFICANT CHANGE UP (ref 8.4–10.5)
CALCIUM SERPL-MCNC: 9.8 MG/DL — SIGNIFICANT CHANGE UP (ref 8.4–10.5)
CHLORIDE SERPL-SCNC: 104 MMOL/L — SIGNIFICANT CHANGE UP (ref 98–107)
CHLORIDE SERPL-SCNC: 106 MMOL/L — SIGNIFICANT CHANGE UP (ref 98–107)
CO2 SERPL-SCNC: 24 MMOL/L — SIGNIFICANT CHANGE UP (ref 22–31)
CO2 SERPL-SCNC: 26 MMOL/L — SIGNIFICANT CHANGE UP (ref 22–31)
CREAT SERPL-MCNC: 0.82 MG/DL — SIGNIFICANT CHANGE UP (ref 0.5–1.3)
CREAT SERPL-MCNC: 0.94 MG/DL — SIGNIFICANT CHANGE UP (ref 0.5–1.3)
EGFR: 61 ML/MIN/1.73M2 — SIGNIFICANT CHANGE UP
EGFR: 61 ML/MIN/1.73M2 — SIGNIFICANT CHANGE UP
EGFR: 71 ML/MIN/1.73M2 — SIGNIFICANT CHANGE UP
EGFR: 71 ML/MIN/1.73M2 — SIGNIFICANT CHANGE UP
EOSINOPHIL # BLD AUTO: 0.16 K/UL — SIGNIFICANT CHANGE UP (ref 0–0.5)
EOSINOPHIL NFR BLD AUTO: 2.1 % — SIGNIFICANT CHANGE UP (ref 0–6)
GLUCOSE BLDC GLUCOMTR-MCNC: 107 MG/DL — HIGH (ref 70–99)
GLUCOSE BLDC GLUCOMTR-MCNC: 118 MG/DL — HIGH (ref 70–99)
GLUCOSE BLDC GLUCOMTR-MCNC: 155 MG/DL — HIGH (ref 70–99)
GLUCOSE BLDC GLUCOMTR-MCNC: 157 MG/DL — HIGH (ref 70–99)
GLUCOSE BLDC GLUCOMTR-MCNC: 238 MG/DL — HIGH (ref 70–99)
GLUCOSE SERPL-MCNC: 190 MG/DL — HIGH (ref 70–99)
GLUCOSE SERPL-MCNC: 95 MG/DL — SIGNIFICANT CHANGE UP (ref 70–99)
HCT VFR BLD CALC: 33 % — LOW (ref 34.5–45)
HCT VFR BLD CALC: 35.1 % — SIGNIFICANT CHANGE UP (ref 34.5–45)
HGB BLD-MCNC: 11.6 G/DL — SIGNIFICANT CHANGE UP (ref 11.5–15.5)
HGB BLD-MCNC: 12.3 G/DL — SIGNIFICANT CHANGE UP (ref 11.5–15.5)
IANC: 5.21 K/UL — SIGNIFICANT CHANGE UP (ref 1.8–7.4)
IMM GRANULOCYTES NFR BLD AUTO: 0.3 % — SIGNIFICANT CHANGE UP (ref 0–0.9)
INR BLD: 0.91 RATIO — SIGNIFICANT CHANGE UP (ref 0.85–1.16)
LYMPHOCYTES # BLD AUTO: 1.43 K/UL — SIGNIFICANT CHANGE UP (ref 1–3.3)
LYMPHOCYTES # BLD AUTO: 19.1 % — SIGNIFICANT CHANGE UP (ref 13–44)
MAGNESIUM SERPL-MCNC: 2.1 MG/DL — SIGNIFICANT CHANGE UP (ref 1.6–2.6)
MCHC RBC-ENTMCNC: 30.8 PG — SIGNIFICANT CHANGE UP (ref 27–34)
MCHC RBC-ENTMCNC: 30.9 PG — SIGNIFICANT CHANGE UP (ref 27–34)
MCHC RBC-ENTMCNC: 35 G/DL — SIGNIFICANT CHANGE UP (ref 32–36)
MCHC RBC-ENTMCNC: 35.2 G/DL — SIGNIFICANT CHANGE UP (ref 32–36)
MCV RBC AUTO: 87.5 FL — SIGNIFICANT CHANGE UP (ref 80–100)
MCV RBC AUTO: 88.2 FL — SIGNIFICANT CHANGE UP (ref 80–100)
MONOCYTES # BLD AUTO: 0.65 K/UL — SIGNIFICANT CHANGE UP (ref 0–0.9)
MONOCYTES NFR BLD AUTO: 8.7 % — SIGNIFICANT CHANGE UP (ref 2–14)
NEUTROPHILS # BLD AUTO: 5.21 K/UL — SIGNIFICANT CHANGE UP (ref 1.8–7.4)
NEUTROPHILS NFR BLD AUTO: 69.4 % — SIGNIFICANT CHANGE UP (ref 43–77)
NRBC # BLD AUTO: 0 K/UL — SIGNIFICANT CHANGE UP (ref 0–0)
NRBC # BLD AUTO: 0 K/UL — SIGNIFICANT CHANGE UP (ref 0–0)
NRBC # FLD: 0 K/UL — SIGNIFICANT CHANGE UP (ref 0–0)
NRBC # FLD: 0 K/UL — SIGNIFICANT CHANGE UP (ref 0–0)
NRBC BLD AUTO-RTO: 0 /100 WBCS — SIGNIFICANT CHANGE UP (ref 0–0)
NRBC BLD AUTO-RTO: 0 /100 WBCS — SIGNIFICANT CHANGE UP (ref 0–0)
PHOSPHATE SERPL-MCNC: 2.2 MG/DL — LOW (ref 2.5–4.5)
PLATELET # BLD AUTO: 194 K/UL — SIGNIFICANT CHANGE UP (ref 150–400)
PLATELET # BLD AUTO: 205 K/UL — SIGNIFICANT CHANGE UP (ref 150–400)
POTASSIUM SERPL-MCNC: 2.9 MMOL/L — CRITICAL LOW (ref 3.5–5.3)
POTASSIUM SERPL-MCNC: 3.8 MMOL/L — SIGNIFICANT CHANGE UP (ref 3.5–5.3)
POTASSIUM SERPL-SCNC: 2.9 MMOL/L — CRITICAL LOW (ref 3.5–5.3)
POTASSIUM SERPL-SCNC: 3.8 MMOL/L — SIGNIFICANT CHANGE UP (ref 3.5–5.3)
PROT SERPL-MCNC: 7.5 G/DL — SIGNIFICANT CHANGE UP (ref 6–8.3)
PROT SERPL-MCNC: 8.2 G/DL — SIGNIFICANT CHANGE UP (ref 6–8.3)
PROTHROM AB SERPL-ACNC: 10.8 SEC — SIGNIFICANT CHANGE UP (ref 9.9–13.4)
RBC # BLD: 3.77 M/UL — LOW (ref 3.8–5.2)
RBC # BLD: 3.98 M/UL — SIGNIFICANT CHANGE UP (ref 3.8–5.2)
RBC # FLD: 13.5 % — SIGNIFICANT CHANGE UP (ref 10.3–14.5)
RBC # FLD: 13.5 % — SIGNIFICANT CHANGE UP (ref 10.3–14.5)
SODIUM SERPL-SCNC: 141 MMOL/L — SIGNIFICANT CHANGE UP (ref 135–145)
SODIUM SERPL-SCNC: 144 MMOL/L — SIGNIFICANT CHANGE UP (ref 135–145)
WBC # BLD: 6.28 K/UL — SIGNIFICANT CHANGE UP (ref 3.8–10.5)
WBC # BLD: 7.5 K/UL — SIGNIFICANT CHANGE UP (ref 3.8–10.5)
WBC # FLD AUTO: 6.28 K/UL — SIGNIFICANT CHANGE UP (ref 3.8–10.5)
WBC # FLD AUTO: 7.5 K/UL — SIGNIFICANT CHANGE UP (ref 3.8–10.5)

## 2025-04-12 PROCEDURE — 99223 1ST HOSP IP/OBS HIGH 75: CPT | Mod: GC

## 2025-04-12 PROCEDURE — 72131 CT LUMBAR SPINE W/O DYE: CPT | Mod: 26

## 2025-04-12 PROCEDURE — 93010 ELECTROCARDIOGRAM REPORT: CPT

## 2025-04-12 RX ORDER — DEXTROSE 50 % IN WATER 50 %
25 SYRINGE (ML) INTRAVENOUS ONCE
Refills: 0 | Status: DISCONTINUED | OUTPATIENT
Start: 2025-04-12 | End: 2025-04-14

## 2025-04-12 RX ORDER — ATORVASTATIN CALCIUM 80 MG/1
1 TABLET, FILM COATED ORAL
Refills: 0 | DISCHARGE

## 2025-04-12 RX ORDER — INSULIN LISPRO 100 U/ML
INJECTION, SOLUTION INTRAVENOUS; SUBCUTANEOUS AT BEDTIME
Refills: 0 | Status: DISCONTINUED | OUTPATIENT
Start: 2025-04-12 | End: 2025-04-14

## 2025-04-12 RX ORDER — CLOPIDOGREL BISULFATE 75 MG/1
75 TABLET, FILM COATED ORAL DAILY
Refills: 0 | Status: DISCONTINUED | OUTPATIENT
Start: 2025-04-12 | End: 2025-04-14

## 2025-04-12 RX ORDER — ISOSORBIDE MONONITRATE 60 MG/1
30 TABLET, EXTENDED RELEASE ORAL DAILY
Refills: 0 | Status: DISCONTINUED | OUTPATIENT
Start: 2025-04-12 | End: 2025-04-14

## 2025-04-12 RX ORDER — NIFEDIPINE 30 MG
1 TABLET, EXTENDED RELEASE 24 HR ORAL
Refills: 0 | DISCHARGE

## 2025-04-12 RX ORDER — MAGNESIUM, ALUMINUM HYDROXIDE 200-200 MG
30 TABLET,CHEWABLE ORAL EVERY 4 HOURS
Refills: 0 | Status: DISCONTINUED | OUTPATIENT
Start: 2025-04-12 | End: 2025-04-14

## 2025-04-12 RX ORDER — NIFEDIPINE 30 MG
60 TABLET, EXTENDED RELEASE 24 HR ORAL DAILY
Refills: 0 | Status: DISCONTINUED | OUTPATIENT
Start: 2025-04-12 | End: 2025-04-14

## 2025-04-12 RX ORDER — LOSARTAN POTASSIUM 100 MG/1
1 TABLET, FILM COATED ORAL
Refills: 0 | DISCHARGE

## 2025-04-12 RX ORDER — OXYCODONE HYDROCHLORIDE 30 MG/1
2.5 TABLET ORAL EVERY 6 HOURS
Refills: 0 | Status: DISCONTINUED | OUTPATIENT
Start: 2025-04-12 | End: 2025-04-14

## 2025-04-12 RX ORDER — SODIUM CHLORIDE 9 G/1000ML
1000 INJECTION, SOLUTION INTRAVENOUS
Refills: 0 | Status: DISCONTINUED | OUTPATIENT
Start: 2025-04-12 | End: 2025-04-14

## 2025-04-12 RX ORDER — CALCITRIOL 0.5 UG/1
0.25 CAPSULE, GELATIN COATED ORAL DAILY
Refills: 0 | Status: DISCONTINUED | OUTPATIENT
Start: 2025-04-12 | End: 2025-04-14

## 2025-04-12 RX ORDER — ICOSAPENT ETHYL 500 MG/1
2 CAPSULE ORAL
Refills: 0 | DISCHARGE

## 2025-04-12 RX ORDER — DEXTROSE 50 % IN WATER 50 %
12.5 SYRINGE (ML) INTRAVENOUS ONCE
Refills: 0 | Status: DISCONTINUED | OUTPATIENT
Start: 2025-04-12 | End: 2025-04-14

## 2025-04-12 RX ORDER — METOPROLOL SUCCINATE 50 MG/1
1 TABLET, EXTENDED RELEASE ORAL
Refills: 0 | DISCHARGE

## 2025-04-12 RX ORDER — FINERENONE 10 MG/1
1 TABLET, FILM COATED ORAL
Refills: 0 | DISCHARGE

## 2025-04-12 RX ORDER — ATORVASTATIN CALCIUM 80 MG/1
20 TABLET, FILM COATED ORAL AT BEDTIME
Refills: 0 | Status: DISCONTINUED | OUTPATIENT
Start: 2025-04-12 | End: 2025-04-13

## 2025-04-12 RX ORDER — CALCIUM CARBONATE/VITAMIN D3 500MG-5MCG
1 TABLET ORAL
Refills: 0 | DISCHARGE

## 2025-04-12 RX ORDER — ACETAMINOPHEN 500 MG/5ML
650 LIQUID (ML) ORAL EVERY 6 HOURS
Refills: 0 | Status: DISCONTINUED | OUTPATIENT
Start: 2025-04-12 | End: 2025-04-14

## 2025-04-12 RX ORDER — CLOPIDOGREL BISULFATE 75 MG/1
1 TABLET, FILM COATED ORAL
Refills: 0 | DISCHARGE

## 2025-04-12 RX ORDER — SOD PHOS DI, MONO/K PHOS MONO 250 MG
2 TABLET ORAL
Refills: 0 | Status: DISCONTINUED | OUTPATIENT
Start: 2025-04-12 | End: 2025-04-13

## 2025-04-12 RX ORDER — DEXTROSE 50 % IN WATER 50 %
15 SYRINGE (ML) INTRAVENOUS ONCE
Refills: 0 | Status: DISCONTINUED | OUTPATIENT
Start: 2025-04-12 | End: 2025-04-14

## 2025-04-12 RX ORDER — INSULIN LISPRO 100 U/ML
INJECTION, SOLUTION INTRAVENOUS; SUBCUTANEOUS
Refills: 0 | Status: DISCONTINUED | OUTPATIENT
Start: 2025-04-12 | End: 2025-04-14

## 2025-04-12 RX ORDER — OXYCODONE HYDROCHLORIDE 30 MG/1
5 TABLET ORAL EVERY 6 HOURS
Refills: 0 | Status: DISCONTINUED | OUTPATIENT
Start: 2025-04-12 | End: 2025-04-14

## 2025-04-12 RX ORDER — GLUCAGON 3 MG/1
1 POWDER NASAL ONCE
Refills: 0 | Status: DISCONTINUED | OUTPATIENT
Start: 2025-04-12 | End: 2025-04-14

## 2025-04-12 RX ORDER — ISOSORBIDE MONONITRATE 60 MG/1
1 TABLET, EXTENDED RELEASE ORAL
Refills: 0 | DISCHARGE

## 2025-04-12 RX ORDER — LOSARTAN POTASSIUM 100 MG/1
25 TABLET, FILM COATED ORAL DAILY
Refills: 0 | Status: DISCONTINUED | OUTPATIENT
Start: 2025-04-12 | End: 2025-04-14

## 2025-04-12 RX ORDER — METOPROLOL SUCCINATE 50 MG/1
25 TABLET, EXTENDED RELEASE ORAL DAILY
Refills: 0 | Status: DISCONTINUED | OUTPATIENT
Start: 2025-04-12 | End: 2025-04-14

## 2025-04-12 RX ADMIN — Medication 2 PACKET(S): at 21:59

## 2025-04-12 RX ADMIN — Medication 100 MILLIEQUIVALENT(S): at 04:29

## 2025-04-12 RX ADMIN — Medication 100 MILLIEQUIVALENT(S): at 01:53

## 2025-04-12 RX ADMIN — OXYCODONE HYDROCHLORIDE 5 MILLIGRAM(S): 30 TABLET ORAL at 00:49

## 2025-04-12 RX ADMIN — ATORVASTATIN CALCIUM 20 MILLIGRAM(S): 80 TABLET, FILM COATED ORAL at 22:00

## 2025-04-12 RX ADMIN — INSULIN LISPRO 1: 100 INJECTION, SOLUTION INTRAVENOUS; SUBCUTANEOUS at 17:56

## 2025-04-12 RX ADMIN — OXYCODONE HYDROCHLORIDE 5 MILLIGRAM(S): 30 TABLET ORAL at 23:00

## 2025-04-12 RX ADMIN — Medication 1000 MILLIGRAM(S): at 02:00

## 2025-04-12 RX ADMIN — Medication 100 MILLIEQUIVALENT(S): at 03:19

## 2025-04-12 RX ADMIN — LIDOCAINE HYDROCHLORIDE 1 PATCH: 20 JELLY TOPICAL at 12:06

## 2025-04-12 RX ADMIN — OXYCODONE HYDROCHLORIDE 5 MILLIGRAM(S): 30 TABLET ORAL at 22:00

## 2025-04-12 RX ADMIN — Medication 400 MILLIGRAM(S): at 00:48

## 2025-04-12 RX ADMIN — INSULIN LISPRO 2: 100 INJECTION, SOLUTION INTRAVENOUS; SUBCUTANEOUS at 12:42

## 2025-04-12 RX ADMIN — Medication 40 MILLIEQUIVALENT(S): at 01:50

## 2025-04-12 RX ADMIN — LIDOCAINE HYDROCHLORIDE 1 PATCH: 20 JELLY TOPICAL at 00:49

## 2025-04-12 RX ADMIN — OXYCODONE HYDROCHLORIDE 5 MILLIGRAM(S): 30 TABLET ORAL at 02:00

## 2025-04-12 NOTE — ED ADULT NURSE REASSESSMENT NOTE - STATUS
Pt arrives with daughter Pt ranjeet speaking as per daughter pt has back pain for past week last few days worsening. She had a fall 2 weeks ago.

## 2025-04-12 NOTE — H&P ADULT - PROBLEM SELECTOR PLAN 1
Patient w/mechanical fall 2 weeks ago. Found to have L4 compression fracture on Xray. Did not take oxycodone and tramadol as PCP prescribed. Has been experiencing worsening pain while walking and laying down. CT L-spine confirmed L4 compression fracture    Plan  - Call Ok for TLSO Brace  - Pain regimen  Mild: Tylenol 650 q6hrs  Moderate: Oxycodone 2.5 q6hrs prn  Severe: Oxycodone 5 q6hrs prn   - PT eval   - Lidocaine patch as necessary Patient w/mechanical fall 2 weeks ago. Found to have L4 compression fracture on Xray. Did not take oxycodone and tramadol as PCP prescribed. Pain worsened and walking became very difficult. CT L-spine confirmed L4 compression fracture. Pain 5-6/10 when laying flat and very severe when sitting up (says unable to sit up because of this). Pain improved with Oxy 5 in ED    Plan  - Call Ok for TLSO Brace  - Pain regimen  Mild: Tylenol 650 q6hrs  Moderate: Oxycodone 2.5 q6hrs prn  Severe: Oxycodone 5 q6hrs prn   - PT eval   - Lidocaine patch Patient w/mechanical fall 2 weeks ago. Found to have L4 compression fracture on Xray. Did not take oxycodone and tramadol as PCP prescribed. Pain worsened and walking became very difficult. CT L-spine confirmed L4 compression fracture. Pain 5-6/10 when laying flat and very severe when sitting up (says unable to sit up because of this). Pain improved with Oxy 5 in ED    Plan  - Spine consult regarding surgical intervention  - Call Ok for TLSO Brace  - Pain regimen  Mild: Tylenol 650 q6hrs  Moderate: Oxycodone 2.5 q6hrs prn  Severe: Oxycodone 5 q6hrs prn   - PT eval   - Lidocaine patch

## 2025-04-12 NOTE — H&P ADULT - PROBLEM SELECTOR PLAN 2
Hx of T2DM Patient w/HTN on home meds Nifedipine 60 ER qd, Imdur 30 qd, Losartan 25 qd, Metoprolol Succinate 25 qd. BP elevated in ED >160 systolic. BP elevated upon admission    Plan  - Restart home BP meds  - Trend BP

## 2025-04-12 NOTE — PATIENT PROFILE ADULT - FALL HARM RISK - HARM RISK INTERVENTIONS

## 2025-04-12 NOTE — CONSULT NOTE ADULT - ASSESSMENT
ASSESSMENT & PLAN  84yFemale w/ L4 VCF.    -WBAT  -obtain CT C and Tsp  -pain control  -incentive spirometry  -PT/OT  -no acute ortho spine surgery at this time      Rena Lindsay, PGY-2  Orthopedic Surgery    [facility] [place]: [id number]  [facility]: [id number]  [facility]:  [id number]/ [phone]

## 2025-04-12 NOTE — H&P ADULT - PROBLEM SELECTOR PLAN 8
Hx of recurrent UTI. Last UTI in January    Plan  - Monitor ?Hx of Osteoporosis on Alendronate 70 weekly and Calcitriol 0.25 qd    Plan  - Hold Alendronate Hx of Osteoporosis on Alendronate 70 weekly and Calcitriol 0.25 qd    Plan  - Hold Alendronate  - c/w Calcitriol

## 2025-04-12 NOTE — H&P ADULT - NSHPSOCIALHISTORY_GEN_ALL_CORE
Vanicream--deodorant, shampoo    ___________    XEROSIS (DRY SKIN)        Definition    Xerosis is the term for dry skin.  We all have a natural oil coating over our skin produced by the skin oil glands.  If this oil is removed, the skin becomes dry which can lead to cracking, which can lead to inflammation.  Xerosis is usually a long-term problem that recurs often, especially in the winter.    Cause    Long hot baths or showers can remove our natural oil and lead to xerosis.  One should never take more than one bath or shower a day and for no longer than ten minutes.  Use of harsh soaps such as Zest, Dial, and Ivory can worsen and cause xerosis.  Cold winter weather worsens xerosis because the amount of moisture contained in cold air is much less than the amount of moisture in warm air.    Treatment    Treatment is intended to restore the natural oil to your skin.  Keep the skin lubricated.    Do not take more than one bath or shower a day.  Use lukewarm water, not hot.  Hot water dries out the skin.    Use a gentle moisturizing soap such as Cetaphil soap, Oil of Olay, Dove, Basis, Ivory moisture care, Restoraderm cleanser.    When toweling dry, dont rub.  Blot the skin so there is still some water left on the skin.  You should apply a moisturizing cream to all of the skin such as Cerave cream, Cetaphil cream, Lipikar O'Brien AP+ Intense Repair Moisturizing Cream or Restoraderm or Eucerin Original Formula cream.   Alpha hydroxyacid lotions, i.e., AmLactin, also work very well for preventing dry skin, but may burn when used on inflamed or reddened skin.    If you like to swim during the winter months, you should not use soap when getting out of the pool.  When you have finished swimming, rinse off the chlorine with cool to warm water.  If this will be the only shower of the day, then you may use Cetaphil or another mild soap to cleanse your skin.  After the shower, apply a moisturizing cream to all of the skin as  above.        [address] [place] [address]/ [phone] FAX/ [email or web address]    Daughter takes care of her

## 2025-04-12 NOTE — ED ADULT NURSE REASSESSMENT NOTE - COMFORT CARE
Pt wheelchair to bathroom dose use cane./assisted to bathroom/plan of care explained/repositioned/side rails up

## 2025-04-12 NOTE — H&P ADULT - PROBLEM SELECTOR PLAN 4
Hx of CVA 15 years ago w/residual L sided deficits. On Plavix    Plan  - c/w Plavix K at 2.9 in ED. s/p repletion w/KCL 40 and Potassium Phosphate 10.     Plan  - Check BMP at 6 PM  - Trend BMP K at 2.9 in ED. s/p repletion w/KCL 40 and Potassium Phosphate 10.     Plan  - Trend BMP

## 2025-04-12 NOTE — H&P ADULT - NSHPPHYSICALEXAM_GEN_ALL_CORE
T(C): 36.8 (04-12-25 @ 07:53), Max: 37 (04-12-25 @ 05:41)  HR: 68 (04-12-25 @ 07:53) (66 - 79)  BP: 160/66 (04-12-25 @ 07:53) (138/63 - 184/72)  RR: 17 (04-12-25 @ 07:53) (16 - 18)  SpO2: 99% (04-12-25 @ 07:53) (96% - 99%)    CONSTITUTIONAL: Well groomed, no apparent distress  EYES: PERRLA and symmetric, EOMI, No conjunctival or scleral injection, non-icteric  RESP: No respiratory distress, no use of accessory muscles; CTA b/l, no WRR  CV: RRR, +S1S2, no MRG; no JVD; no peripheral edema  Back: Spinal tenderness at L4  GI: Soft, NT, ND  NEURO:  Mild LUE and LLE weakness  PSYCH: AO3, Mandarin speaking

## 2025-04-12 NOTE — CONSULT NOTE ADULT - SUBJECTIVE AND OBJECTIVE BOX
HPI  84yFemale w/ c/o lower back pain s/p MF a week ago. Notes that she fell from standing height and hit her head. Denies LOC. Denies focal weakness, numbness/tingling, or radicular sxs. Denies fevers/chills, acute changes in bowel/bladder function, or saddle anesthesia. Of note, pt has chronic LLE residual weakness from a prior CVA. Community ambulator w cane at baseline and pt has been able to ambulate at her baseline since the fall.    ROS  Negative unless otherwise specified in HPI.    PAST MEDICAL & SURGICAL Hx  PAST MEDICAL & SURGICAL HISTORY:  Hypertension      Type 2 diabetes mellitus      CVA (cerebral vascular accident)      No significant past surgical history          MEDICATIONS  Home Medications:  alendronate 70 mg oral tablet: 1 tab(s) orally once a week (12 Apr 2025 16:26)  calcitriol 0.25 mcg oral capsule: 1 cap(s) orally once a day (12 Apr 2025 16:26)  clopidogrel 75 mg oral tablet: 1 tab(s) orally once a day (12 Apr 2025 16:26)  glipiZIDE 10 mg oral tablet, extended release: 1 tab(s) orally once a day (12 Apr 2025 16:25)  Imdur 30 mg oral tablet, extended release: 1 tab(s) orally once a day (12 Apr 2025 16:26)  Imdur 30 mg oral tablet, extended release: 1 tab(s) orally once a day (12 Apr 2025 16:25)  Kerendia 10 mg oral tablet: 1 tab(s) orally once a day (12 Apr 2025 16:25)  Lasix 20 mg oral tablet: 1 tab(s) orally once a day (12 Apr 2025 16:25)  Linzess 145 mcg oral capsule: 1 cap(s) orally once a day (12 Apr 2025 16:26)  Lipitor 20 mg oral tablet: 1 tab(s) orally (12 Apr 2025 16:25)  losartan 25 mg oral tablet: 1 tab(s) orally once a day (12 Apr 2025 16:28)  metoprolol succinate 25 mg oral tablet, extended release: 1 tab(s) orally once a day (12 Apr 2025 16:26)  Nephplex Rx oral tablet: 1 tab(s) orally (12 Apr 2025 16:26)  Tradjenta 5 mg oral tablet: 1 tab(s) orally (12 Apr 2025 16:25)  Vascepa 1 g oral capsule: 2 cap(s) orally 2 times a day (12 Apr 2025 16:26)      ALLERGIES  Allergy Status Unknown      FAMILY Hx  FAMILY HISTORY:  FH: type 2 diabetes (Sibling)        SOCIAL Hx  Social History:  Daughter takes care of her (12 Apr 2025 11:53)      VITALS  Vital Signs Last 24 Hrs  T(C): 37.2 (12 Apr 2025 15:30), Max: 37.2 (12 Apr 2025 15:30)  T(F): 99 (12 Apr 2025 15:30), Max: 99 (12 Apr 2025 15:30)  HR: 68 (12 Apr 2025 15:30) (66 - 79)  BP: 180/91 (12 Apr 2025 15:30) (138/63 - 184/72)  BP(mean): --  RR: 17 (12 Apr 2025 15:30) (16 - 18)  SpO2: 98% (12 Apr 2025 15:30) (96% - 99%)    Parameters below as of 12 Apr 2025 15:30  Patient On (Oxygen Delivery Method): room air        PHYSICAL EXAM  Gen: Lying in bed, NAD  Resp: No increased WOB  Spine:  Skin intact  mild bony ttp over Lsp, No other bony TTP or step-offs along c-, t-, l-spine, or sacrum    Motor:                   C5                C6              C7               C8           T1   R            5/5                5/5            5/5             5/5          5/5  L             5/5               5/5             5/5             5/5          5/5                L2             L3             L4               L5            S1  R         5/5           5/5          5/5             5/5           5/5  L          4/5          4/5         3+/5           3+/5         3+/5    Sensory:            C5         C6         C7      C8       T1        (0=absent, 1=impaired, 2=normal, NT=not testable)  R         2            2           2        2         2  L          2            2           2        2         2               L2          L3         L4      L5       S1         (0=absent, 1=impaired, 2=normal, NT=not testable)  R         2            2            2        2        2  L          2            2           2        2         2    (+) Rectal tone, saddle/perianal SILT  (-) Santacruz test b/l  (-) Straight leg raise test b/l  (-) Babinski sign b/l  (-) Ankle clonus b/l      LABS                        12.3   6.28  )-----------( 194      ( 12 Apr 2025 16:49 )             35.1     04-12    141  |  106  |  19  ----------------------------<  190[H]  3.8   |  24  |  0.82    Ca    9.2      12 Apr 2025 17:14  Phos  2.2     04-12  Mg     2.10     04-12    TPro  7.5  /  Alb  3.7  /  TBili  0.4  /  DBili  x   /  AST  834[H]  /  ALT  374[H]  /  AlkPhos  236[H]  04-12    PT/INR - ( 12 Apr 2025 16:45 )   PT: 10.8 sec;   INR: 0.91 ratio         PTT - ( 12 Apr 2025 16:45 )  PTT:29.5 sec    IMAGING  < from: CT Lumbar Spine No Cont (04.12.25 @ 01:13) >  IMPRESSION:    Recent L4 compression fracture with approximately 40% height loss and   minimal osseous retropulsion without significant central canal stenosis.   Spondylolisthesis.    Partially imaged mild pelvic ascites, nonspecific.    < end of copied text >

## 2025-04-12 NOTE — H&P ADULT - PROBLEM SELECTOR PLAN 9
Hx of Chronic Leg swelling on Lasix 20 qd. She has GDMT HF meds, may have hx of HF?    Plan  - c/w Lasix  - c/w GDMT? Hx of recurrent UTI. Last UTI in January    Plan  - Monitor

## 2025-04-12 NOTE — H&P ADULT - HISTORY OF PRESENT ILLNESS
84-year-old female, history hypertension, diabetes, chronic UTIs, on Lasix for swelling, CVA with residual right-sided deficit who presents to ED  84-year-old female, history hypertension, diabetes, chronic UTIs, on Lasix for swelling, CVA with residual right-sided deficit who presents to ED        Upon arrival to the ED, VS significant for /72, which improved down to 138/63. She received Oxycodone, Tylenol, and Lidocaine patch for pain. Labs showed WBC 7.5, Hgb 11.6, K 2.9, SCr 0.94. Potassium was repleted. CT Lumbar Spine w/o contrast showed recent L4 compression fracture with approximately 40% height loss and minimal osseous retropulsion without significant central canal stenosis, as well as sponylolithesis.     Upon admission, the patient 84-year-old female, history hypertension, diabetes, chronic UTIs, on Lasix for swelling, CVA with residual right-sided deficit who presents to ED with worsening low back pain over the past 2 weeks. He had a fall 2 weeks ago, ..........  4 days ago he went to his PCP, and he was found to have an L4 compression fracture. He was given oxycodone and tramadol. However, he only took Tramadol once and Tylenol. In the ED, she denied new numbness, no bowel incontinence, and no saddle anesthesia.     Upon arrival to the ED, VS significant for /72, which improved down to 138/63. She received Oxycodone, Tylenol, and Lidocaine patch for pain. Labs showed WBC 7.5, Hgb 11.6, K 2.9, SCr 0.94. Potassium was repleted. CT Lumbar Spine w/o contrast showed recent L4 compression fracture with approximately 40% height loss and minimal osseous retropulsion without significant central canal stenosis, as well as sponylolithesis.     Upon admission, the patient 84-year-old female, history hypertension, diabetes, chronic UTIs, on Lasix for swelling, CVA with residual right-sided deficit who presents to ED with worsening low back pain over the past 2 weeks. She had a fall 2 weeks ago. She slipped and fell on her bottom, hitting her head along the way. She experienced some pain and swelling of head, which improved after two days. She initially did not experience much back pain, but the pain progressed over the past week. 4 days ago, her daughter took her he to the PCP, and she was found to have an L4 compression fracture. She was given oxycodone and tramadol. However, she only took Tramadol once and Tylenol. Over the past few days, she has been unable to walk as usual and the pain has worsened. The pain was 8/10 yesterday and her BP was >160 systolic. Therefore, her daughter took her to the ED.     Upon arrival to the ED, VS significant for /72, which improved down to 138/63. She received Oxycodone, Tylenol, and Lidocaine patch for pain. Labs showed WBC 7.5, Hgb 11.6, K 2.9, SCr 0.94. Potassium was repleted. CT Lumbar Spine w/o contrast showed recent L4 compression fracture with approximately 40% height loss and minimal osseous retropulsion without significant central canal stenosis, as well as sponylolithesis.     Upon admission, the patient says she is experiencing 5-6/10 pain in her lower back while laying flat. The pain does not radiate anywhere. When she sits up, the pain becomes very severe, and she is thus unable to sit up. When she rolls to the site, the pain also worsens. She is not experiencing any other symptoms. She denies any trouble holding in her urine or bowels. She denies any saddle anesthesia.  84-year-old female, history hypertension, diabetes, chronic UTIs, on Lasix for swelling, CVA with residual L sided deficits who presents to ED with worsening low back pain over the past 2 weeks. She had a fall 2 weeks ago. She slipped and fell on her bottom, hitting her head along the way. She experienced some pain and swelling of head, which improved after two days. She initially did not experience much back pain, but the pain progressed over the past week. 4 days ago, her daughter took her he to the PCP, and she was found to have an L4 compression fracture. She was given oxycodone and tramadol. However, she only took Tramadol once and Tylenol. Over the past few days, she has been unable to walk as usual and the pain has worsened. The pain was 8/10 yesterday and her BP was >160 systolic. Therefore, her daughter took her to the ED.     Upon arrival to the ED, VS significant for /72, which improved down to 138/63. She received Oxycodone, Tylenol, and Lidocaine patch for pain. Labs showed WBC 7.5, Hgb 11.6, K 2.9, SCr 0.94. Potassium was repleted. CT Lumbar Spine w/o contrast showed recent L4 compression fracture with approximately 40% height loss and minimal osseous retropulsion without significant central canal stenosis, as well as sponylolithesis.     Upon admission, the patient says she is experiencing 5-6/10 pain in her lower back while laying flat. The pain does not radiate anywhere. When she sits up, the pain becomes very severe, and she is thus unable to sit up. When she rolls to the site, the pain also worsens. She is not experiencing any other symptoms. She denies any trouble holding in her urine or bowels. She denies any saddle anesthesia. Her pain improved after given Oxycodone in ED.  84-year-old female, history hypertension, diabetes, recent UTIs, on Lasix for swelling, CVA with residual L sided deficits who presents to ED with worsening low back pain over the past 2 weeks. She had a fall 2 weeks ago. She slipped and fell on her bottom, hitting her head along the way. She experienced some pain and swelling of head, which improved after two days. She initially did not experience much back pain, but the pain progressed over the past week. 4 days ago, her daughter took her he to the PCP, and she was found to have an L4 compression fracture. She was given oxycodone and tramadol. However, she only took Tramadol once and Tylenol. Over the past few days, she has been unable to walk as usual and the pain has worsened. The pain was 8/10 yesterday and her BP was >160 systolic. Therefore, her daughter took her to the ED.     Upon arrival to the ED, VS significant for /72, which improved down to 138/63. She received Oxycodone, Tylenol, and Lidocaine patch for pain. Labs showed WBC 7.5, Hgb 11.6, K 2.9, SCr 0.94. Potassium was repleted. CT Lumbar Spine w/o contrast showed recent L4 compression fracture with approximately 40% height loss and minimal osseous retropulsion without significant central canal stenosis, as well as sponylolithesis.     Upon admission, the patient says she is experiencing 5-6/10 pain in her lower back while laying flat. The pain does not radiate anywhere. When she sits up, the pain becomes very severe, and she is thus unable to sit up. When she rolls to the site, the pain also worsens. She is not experiencing any other symptoms. She denies any trouble holding in her urine or bowels. She denies any saddle anesthesia. Her pain improved after given Oxycodone in ED.

## 2025-04-12 NOTE — H&P ADULT - PROBLEM SELECTOR PLAN 3
Hx of T2DM. On Tradjenta 5 qd, Glipizide ER 10mg qd.     Plan  - A1C in AM  - Low ISS  - Hold home PO meds

## 2025-04-12 NOTE — H&P ADULT - PROBLEM SELECTOR PLAN 6
DVT: Hx of CKD. SCr on admission 0.94. On Kerendia.    Plan  - Trend SCr  - c/w Kerendia ?hx of HLD on Lipitor 20 qd and Vascepa 1g 2 caps BID    Plan  - Lipid Profile in AM  - c/w Lipitor   - c/w Vascepa (if in formulary). Otherwise, patient's home med. ?hx of HLD on Lipitor 20 qd and Vascepa 1g 2 caps BID    Plan  - Lipid Profile in AM  - c/w Lipitor   - c/w Vascepa (if in formulary).

## 2025-04-12 NOTE — H&P ADULT - PATIENT'S PREFERRED PRONOUN
[facility] [address]   Phone: [phone]    Occupational Therapy    [x] Initial Evaluation            [] Daily Treatment Note         [] Discharge Summary      Patient: Grady Jones   : 1957   MRN: [id number]   Date of Service:  10/19/2022    Admitting Diagnosis:  Acute embolic stroke [facility])  Current Admission Summary: 72-year-old male with a history of diabetes, MI, HTN, HLD, diabetic neuropathy, and nonrheumatic aortic valve stenosis who was admitted to  on 10/11 for a scheduled TAVR procedure. The procedure was complicated by an episode of V. 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 Work-up including TTE was unremarkable for PFO. He was initiated on Eliquis for prophylaxis. Patient requires significant insulin at baseline with his normal home regimen of 80 units of Lantus with 20 units Humalog 3 times daily. A1c was 8.6. Due to decreased p.o. intake his regimen was adjusted. He was evaluated by therapy and suggested to continue in an inpatient setting prior to returning home  Past Medical History:  has a past medical history of Carbuncle, DM2 (diabetes mellitus, type 2) ([address] ED (erectile dysfunction), History of heart attack, HTN (hypertension), Hyperlipidemia, Peripheral neuropathy, Sleep apnea, and Wears glasses. Past Surgical History:  has a past surgical history that includes Cardiac catheterization (); Pilonidal cyst excision; cyst removal; cardiovascular stress test (2012); cardiovascular stress test (2019); back surgery; Colonoscopy (2021); and Colonoscopy (N/A, 2021).     Discharge Recommendations: [facility] OT S3    DME Required For Discharge: DME to be determined pending patient progress; likely to require shower chair    Precautions/Restrictions: high fall risk, up as tolerated  Weight Bearing Restrictions: no restrictions    Required Braces/Orthotics: no braces required  Positional Restrictions:no positional restrictions    Pre-Admission Information   Lives With: spouse                  Type of Home: house  Home Layout: one level  Home Access:  5 step to enter without rails   Bathroom Layout: walker accessible, wheelchair accessible, tub/shower only  Bathroom Equipment: hand held shower head  Toilet Height: standard height- low  Home Equipment: single point cane, manual wheelchair  Transfer Assistance: Independent without use of device  Ambulation Assistance:Independent without use of device  ADL Assistance: independent with all ADL's with exception of assist to don socks- pt is IND w/ shoes pending shoe style   IADL Assistance: requires assistance with most homemaking tasks; pt IND to mow lawn on riding mower; IND with medications and light meal prep (microwave use/snacks)  Active :        [x] Yes                 [] No  Hand Dominance: [] Left                 [x] Right  Current Employment: retired.   Occupation: Engineering  Hobbies: Drive, fish, working on the car, yard work  [employer]: once a week in yard, has slipped a few times in shower    Examination   Vision:   Vision Corrective Device: wears glasses for reading  Hearing:   [facility]  Perception:   WFL  Observation:   General Observation:  fair posture; rounded shoulders/head forward; IV in R UE   Sensation:   reports numbness and tingling in (B) UE, (B) LE; baseline neuropathy, reports no increased deficits compared to his norm   Proprioception:    diminished proprioception in (R) UE  Tone:   Normotonic  Coordination Testing:   Coordination and Movement Description: (R) UE, fine motor impairments, gross motor impairments, decreased speed, decreased accuracy ; L hand WFL, R hand: unable to complete thumb opposition, light palmar grasp achieved   ROM:   L UE WFL; R UE: shoulder flexion to ~90, elbow WFL, slight flex/ext of wrist, limited digit/hand extension Strength:   L UE WFL; R UE roughly 2/5, elbow 3+/5, poor strength in hand/wrist     Decision Making: medium complexity  Clinical Presentation: evolving      Subjective  General: Pt in bed at entry, pleasant and agreeable to session, denied pain; pt's SO present throughout . Pain: 0/10  Pain Interventions: not applicable        Activities of Daily Living  Basic Activities of Daily Living  Grooming: minimal assistance  Grooming Comments: oral care w/c level- assist to open mouth wash container   Upper Extremity Bathing: moderate assistance  Lower Extremity Bathing: maximum assistance   Bathing Comments: assist to wash/dry R UE, assist for thoroughness of L UE; assist for LE blow knee<>feet + posterior roberto care  Upper Extremity Dressing: moderate assistance  Lower Extremity Dressing: dependent  Dressing Comments: assist to thread shirt on R UE and adjust down trunk; TD for socks, assist to thread feet into pants/brief, pt able to assist in ankle>over knees of clothing only, assist for clothing over hips in stance + balance   General Comments: Pt ambulated to shower- declined toileting needs; UB/LB bathing completed seated on TTB w/ cut-out seat; IV in R UE water-proofed and remained dry/intact post shower   Instrumental Activities of Daily Living  No IADL completed on this date.     Functional Mobility  Bed Mobility  Supine to Sit: minimal assistance  Comments:assist to trunk, HOB flat  Transfers  Sit to stand transfer:minimal assistance  Stand to sit transfer: minimal assistance  Car transfer: minimal assistance  Shower transfer: minimal assistance  Shower transfer equipment: tub transfer bench, walker  Shower transfer comments: VC for sequence d/t novelty, pt baking up + turn w/ RW into shower, Min-CGA for safety   Comments: slightly impulsive at times; VC for hand placement and safety; intermittent Baron for facilitation of forward flexion for stances, periods of Baron to control descent to surfaces  Functional Mobility:  Sitting Balance: stand by assistance, contact guard assistance. Standing Balance: contact guard assistance, minimal assistance. Functional Mobility: .  minimal assistance  Functional Mobility Activity: EOB> shower; 42ft + 20ft in hallway  Functional Mobility Device Use: rolling walker  Functional Mobility Comment: use of R hand RW splint for mobility in hallway d/t poor ; dorsi-flex assist to R LE during 2nd walk in hallway w/ increased clearance noted.  Intermittent assist for RW management and proximity throughout    Other Therapeutic Interventions         Cognition  Overall Cognitive Status: WFL  Arousal/Alterness: appropriate responses to stimuli  Following Commands: follows all commands without difficulty  Attention Span: attends with cues to redirect  Safety Judgement: decreased awareness of need for assistance, decreased awareness of need for safety  Problem Solving: assistance required to implement solutions  Insights: fully aware of deficits  Initiation: requires cues for some  Sequencing: requires cues for some  Comment: cog appears overall WFL based on functional tasks completed during eval; dysarthric speech  Orientation:    alert and oriented x 4  Command Following:   [facility]     Education  Barriers To Learning: none  Patient Education: patient educated on goals, OT role and benefits, plan of care, transfer training, discharge recommendations  Learning Assessment:  patient verbalizes understanding, would benefit from continued reinforcement    Assessment  Activity Tolerance: pt tolerated session well; no complaints of SOB or dizziness; rest breaks incorporated throughout   Impairments Requiring Therapeutic Intervention: decreased functional mobility, decreased ADL status, decreased ROM, decreased strength, decreased safety awareness, decreased endurance, decreased balance, decreased IADL, decreased fine motor control, decreased coordination  Prognosis: good  Clinical Assessment: Pt is a 63 yr old male who presents post CVA w/ primarily R side and speech/language deficits. Pt is below baseline level of function d/t above deficits. Pt does have hx of recent recurring falls at baseline and received some assist from S.O for IADLs- pt typically IND with mobility and all ADLs. Skilled OT services are indicated in an inpatient setting in order to maximize pt's IND and safety with all occupational pursuits.    Safety Interventions: patient left in chair, chair alarm in place, call light within reach, gait belt, patient at risk for falls, nurse notified, and family/caregiver present    Plan  Frequency: 5 x/week, 60 min/day  Current Treatment Recommendations: strengthening, ROM, balance training, functional mobility training, transfer training, endurance training, neuromuscular re-education, ADL/self-care training, IADL training, home management training, safety education, equipment evaluation/education, and positioning    Goals  Patient Goals: \"get back to driving\", regain IND with mobility    Short Term Goals:  Time Frame: 10-14 days  Patient will complete upper body ADL at supervision   Patient will complete lower body ADL at minimal assistance   Patient will complete toileting at minimal assistance   Patient will complete self-feeding at modified independent   Patient will complete grooming at modified independent   Patient will complete functional transfers at supervision     Long Term Goals:  Time Frame: 2-3 weeks   Patient will complete upper body ADL at modified independent   Patient will complete lower body ADL at modified independent   Patient will complete toileting at modified independent   Patient will complete functional transfers at modified independent   Patient will complete functional mobility at modified independent   Patient to gather and transport IADL items at supervision      Therapy Session Time     Individual Group Co-treatment   Time In     0830   Time Out     0945   Minutes     75 Timed Code Treatment Minutes:   60  Total Treatment Minutes:  75       Electronically Signed By: Roscoe Bradford, [address] [facility], OTR/L #[id number] Her/She

## 2025-04-12 NOTE — ED ADULT NURSE REASSESSMENT NOTE - NS ED NURSE REASSESS COMMENT FT1
Pt awake and alert, A&OX4, respirations even and unlabored. Denies CP, SOB, HA, dizziness, palpitations, blurry vision. Resting comfortably. Medications administered per orders. Pt endorsing improvement in pain at this time. Safety and comfort maintained. No acute distress noted.

## 2025-04-12 NOTE — H&P ADULT - PROBLEM SELECTOR PLAN 10
DVT: Heparin subq  Diet: DASH/TLC  Dispo: Pending clinical course and PT eval Hx of Chronic Leg swelling on Lasix 20 qd. She has GDMT HF meds, may have hx of HF?    Plan  - c/w Lasix  - c/w GDMT? Hx of Chronic Leg swelling on Lasix 20 qd.     Plan  - Lasix prn if patient experiences leg swelling

## 2025-04-12 NOTE — H&P ADULT - ATTENDING COMMENTS
84 year old F with hx of HTN, DM, recurrent UTIs, CVA (with residual left sided weakness) and osteoporosis presents for low back pain s/p fall. Language Line  #[id number] provided Mandarin translation. Collateral also obtained from patient's daughter. Patient states that she has been having severe low back pain ever since her fall about 2 weeks ago. Patient hit her head and had a small bump that resolved after a few days. She saw her PCP about 4 days ago and obtained an x-ray which showed an L4 compression fracture. She was prescribed oxycodone and tramadol. She only tried taking one tramadol tablet and has been most using Tylenol. The pain has become worse over the past few days and she reports difficulty ambulating. The pain is worse upon movement and improves when she is still. . However,  she reports now that the pain is worse when she is lying flat and trying to sleep. At its worse the pain was 8/10. The back pain is currently a 5/10 after receiving pain medications.     Physical exam notable for lumbar spinal tenderness.     #Low Back Pain  #Compression Fracture  #S/p fall   #hx of Osteoporosis  CT lumbar spine: Recent L4 compression fracture with approximately 40% height loss and minimal osseous retropulsion without significant central canal stenosis. Spondylolisthesis.  -pain control   -call orthodontics for brace  -PT eval   -c/w calcitriol 0.25 mcg daily   -spine eval   -will clarify day of alendronate       #Type 2 Diabetes  -hold oral DM medications   -f/u A1C  -ISS      #HTN  BP elevated  -resume home BP meds     #Hypokalemia  K:2.9 on admission  repleted in ED  -check BMP in evening     #CVA  -c/w Plavix   -c/w atorvastatin         Rest of care as stated above.

## 2025-04-12 NOTE — ED ADULT NURSE REASSESSMENT NOTE - NS ED NURSE REASSESS COMMENT FT1
Pt awake and alert, A&OX4, respirations even and unlabored. Daughter remains at bedside to translate. Denies chest pain, SOB, HA, dizziness, palpitations, blurry vision. Resting comfortably. Safety and comfort maintained. Vitals as documetned, no acute distress noted. Awaiting admission.

## 2025-04-12 NOTE — H&P ADULT - PROBLEM SELECTOR PLAN 5
?hx of HLD on Lipitor 20 qd and Vascepa 1g 2 caps BID    Plan  - Lipid Profile in AM  - c/w Lipitor   - c/w Vascepa (if in formulary). Otherwise, patient's home med. Hx of CVA 15 years ago w/residual L sided deficits. On Plavix    Plan  - c/w Plavix

## 2025-04-12 NOTE — H&P ADULT - ASSESSMENT
84-year-old female, history hypertension, diabetes, chronic UTIs, on Lasix for swelling, CVA with residual L-sided deficit who presented to the ED with worsening back pain. Fell 2 weeks ago, pain worsened, gait impaired, went to PCP. Found to have L4 compression fracture on XR. Pain worsened over past few days and patient was unable to walk. In ED, CT L Spine confirmed L4 compression fracture. Here for pain management and TLSO brace. Pending PT eval.  84-year-old female, history hypertension, diabetes, recent UTIs, on Lasix for swelling, CVA with residual L-sided deficit who presented to the ED with worsening back pain. Fell 2 weeks ago, pain worsened, gait impaired, went to PCP. Found to have L4 compression fracture on XR. Pain worsened over past few days and patient was unable to walk. In ED, CT L Spine confirmed L4 compression fracture. Here for pain management and TLSO brace. Pending PT eval.

## 2025-04-13 DIAGNOSIS — R74.01 ELEVATION OF LEVELS OF LIVER TRANSAMINASE LEVELS: ICD-10-CM

## 2025-04-13 LAB
A1C WITH ESTIMATED AVERAGE GLUCOSE RESULT: 6.3 % — HIGH (ref 4–5.6)
ADD ON TEST-SPECIMEN IN LAB: SIGNIFICANT CHANGE UP
ALBUMIN SERPL ELPH-MCNC: 3.5 G/DL — SIGNIFICANT CHANGE UP (ref 3.3–5)
ALP SERPL-CCNC: 207 U/L — HIGH (ref 40–120)
ALT FLD-CCNC: 264 U/L — HIGH (ref 4–33)
ANION GAP SERPL CALC-SCNC: 12 MMOL/L — SIGNIFICANT CHANGE UP (ref 7–14)
AST SERPL-CCNC: 329 U/L — HIGH (ref 4–32)
BILIRUB SERPL-MCNC: 0.4 MG/DL — SIGNIFICANT CHANGE UP (ref 0.2–1.2)
BUN SERPL-MCNC: 18 MG/DL — SIGNIFICANT CHANGE UP (ref 7–23)
CALCIUM SERPL-MCNC: 9.3 MG/DL — SIGNIFICANT CHANGE UP (ref 8.4–10.5)
CHLORIDE SERPL-SCNC: 108 MMOL/L — HIGH (ref 98–107)
CHOLEST SERPL-MCNC: 142 MG/DL — SIGNIFICANT CHANGE UP
CK SERPL-CCNC: 86 U/L — SIGNIFICANT CHANGE UP (ref 25–170)
CO2 SERPL-SCNC: 24 MMOL/L — SIGNIFICANT CHANGE UP (ref 22–31)
CREAT SERPL-MCNC: 0.78 MG/DL — SIGNIFICANT CHANGE UP (ref 0.5–1.3)
EGFR: 76 ML/MIN/1.73M2 — SIGNIFICANT CHANGE UP
EGFR: 76 ML/MIN/1.73M2 — SIGNIFICANT CHANGE UP
ESTIMATED AVERAGE GLUCOSE: 134 — SIGNIFICANT CHANGE UP
GGT SERPL-CCNC: 209 U/L — HIGH (ref 5–36)
GLUCOSE BLDC GLUCOMTR-MCNC: 154 MG/DL — HIGH (ref 70–99)
GLUCOSE BLDC GLUCOMTR-MCNC: 161 MG/DL — HIGH (ref 70–99)
GLUCOSE BLDC GLUCOMTR-MCNC: 191 MG/DL — HIGH (ref 70–99)
GLUCOSE BLDC GLUCOMTR-MCNC: 200 MG/DL — HIGH (ref 70–99)
GLUCOSE BLDC GLUCOMTR-MCNC: 201 MG/DL — HIGH (ref 70–99)
GLUCOSE SERPL-MCNC: 135 MG/DL — HIGH (ref 70–99)
HCT VFR BLD CALC: 36.3 % — SIGNIFICANT CHANGE UP (ref 34.5–45)
HDLC SERPL-MCNC: 56 MG/DL — SIGNIFICANT CHANGE UP
HGB BLD-MCNC: 12.5 G/DL — SIGNIFICANT CHANGE UP (ref 11.5–15.5)
LDLC SERPL-MCNC: 70 MG/DL — SIGNIFICANT CHANGE UP
LIPID PNL WITH DIRECT LDL SERPL: 70 MG/DL — SIGNIFICANT CHANGE UP
MAGNESIUM SERPL-MCNC: 2.2 MG/DL — SIGNIFICANT CHANGE UP (ref 1.6–2.6)
MCHC RBC-ENTMCNC: 30.6 PG — SIGNIFICANT CHANGE UP (ref 27–34)
MCHC RBC-ENTMCNC: 34.4 G/DL — SIGNIFICANT CHANGE UP (ref 32–36)
MCV RBC AUTO: 89 FL — SIGNIFICANT CHANGE UP (ref 80–100)
NONHDLC SERPL-MCNC: 86 MG/DL — SIGNIFICANT CHANGE UP
NRBC # BLD AUTO: 0 K/UL — SIGNIFICANT CHANGE UP (ref 0–0)
NRBC # FLD: 0 K/UL — SIGNIFICANT CHANGE UP (ref 0–0)
NRBC BLD AUTO-RTO: 0 /100 WBCS — SIGNIFICANT CHANGE UP (ref 0–0)
PHOSPHATE SERPL-MCNC: 3.1 MG/DL — SIGNIFICANT CHANGE UP (ref 2.5–4.5)
PLATELET # BLD AUTO: 202 K/UL — SIGNIFICANT CHANGE UP (ref 150–400)
POTASSIUM SERPL-MCNC: 3.5 MMOL/L — SIGNIFICANT CHANGE UP (ref 3.5–5.3)
POTASSIUM SERPL-SCNC: 3.5 MMOL/L — SIGNIFICANT CHANGE UP (ref 3.5–5.3)
PROT SERPL-MCNC: 7.3 G/DL — SIGNIFICANT CHANGE UP (ref 6–8.3)
RBC # BLD: 4.08 M/UL — SIGNIFICANT CHANGE UP (ref 3.8–5.2)
RBC # FLD: 13.7 % — SIGNIFICANT CHANGE UP (ref 10.3–14.5)
SODIUM SERPL-SCNC: 144 MMOL/L — SIGNIFICANT CHANGE UP (ref 135–145)
TRIGL SERPL-MCNC: 87 MG/DL — SIGNIFICANT CHANGE UP
WBC # BLD: 6.67 K/UL — SIGNIFICANT CHANGE UP (ref 3.8–10.5)
WBC # FLD AUTO: 6.67 K/UL — SIGNIFICANT CHANGE UP (ref 3.8–10.5)

## 2025-04-13 PROCEDURE — 72125 CT NECK SPINE W/O DYE: CPT | Mod: 26

## 2025-04-13 PROCEDURE — 99232 SBSQ HOSP IP/OBS MODERATE 35: CPT

## 2025-04-13 PROCEDURE — 99222 1ST HOSP IP/OBS MODERATE 55: CPT

## 2025-04-13 PROCEDURE — 76705 ECHO EXAM OF ABDOMEN: CPT | Mod: 26

## 2025-04-13 PROCEDURE — 72128 CT CHEST SPINE W/O DYE: CPT | Mod: 26

## 2025-04-13 RX ORDER — BISACODYL 5 MG
10 TABLET, DELAYED RELEASE (ENTERIC COATED) ORAL ONCE
Refills: 0 | Status: COMPLETED | OUTPATIENT
Start: 2025-04-13 | End: 2025-04-13

## 2025-04-13 RX ORDER — HEPARIN SODIUM 1000 [USP'U]/ML
5000 INJECTION INTRAVENOUS; SUBCUTANEOUS EVERY 12 HOURS
Refills: 0 | Status: DISCONTINUED | OUTPATIENT
Start: 2025-04-13 | End: 2025-04-14

## 2025-04-13 RX ORDER — SOD PHOS DI, MONO/K PHOS MONO 250 MG
2 TABLET ORAL ONCE
Refills: 0 | Status: COMPLETED | OUTPATIENT
Start: 2025-04-13 | End: 2025-04-13

## 2025-04-13 RX ADMIN — CALCITRIOL 0.25 MICROGRAM(S): 0.5 CAPSULE, GELATIN COATED ORAL at 11:54

## 2025-04-13 RX ADMIN — INSULIN LISPRO 1: 100 INJECTION, SOLUTION INTRAVENOUS; SUBCUTANEOUS at 17:41

## 2025-04-13 RX ADMIN — Medication 2 PACKET(S): at 01:08

## 2025-04-13 RX ADMIN — CLOPIDOGREL BISULFATE 75 MILLIGRAM(S): 75 TABLET, FILM COATED ORAL at 11:55

## 2025-04-13 RX ADMIN — INSULIN LISPRO 1: 100 INJECTION, SOLUTION INTRAVENOUS; SUBCUTANEOUS at 09:03

## 2025-04-13 RX ADMIN — Medication 60 MILLIGRAM(S): at 06:54

## 2025-04-13 RX ADMIN — HEPARIN SODIUM 5000 UNIT(S): 1000 INJECTION INTRAVENOUS; SUBCUTANEOUS at 17:46

## 2025-04-13 RX ADMIN — METOPROLOL SUCCINATE 25 MILLIGRAM(S): 50 TABLET, EXTENDED RELEASE ORAL at 06:54

## 2025-04-13 RX ADMIN — ISOSORBIDE MONONITRATE 30 MILLIGRAM(S): 60 TABLET, EXTENDED RELEASE ORAL at 11:54

## 2025-04-13 RX ADMIN — INSULIN LISPRO 1: 100 INJECTION, SOLUTION INTRAVENOUS; SUBCUTANEOUS at 12:50

## 2025-04-13 RX ADMIN — Medication 650 MILLIGRAM(S): at 22:35

## 2025-04-13 RX ADMIN — Medication 650 MILLIGRAM(S): at 23:35

## 2025-04-13 RX ADMIN — LOSARTAN POTASSIUM 25 MILLIGRAM(S): 100 TABLET, FILM COATED ORAL at 06:54

## 2025-04-13 RX ADMIN — Medication 10 MILLIGRAM(S): at 15:51

## 2025-04-13 NOTE — PROGRESS NOTE ADULT - PROBLEM SELECTOR PLAN 1
Patient w/mechanical fall 2 weeks ago. Found to have L4 compression fracture on Xray. Did not take oxycodone and tramadol as PCP prescribed. Pain worsened and walking became very difficult. CT L-spine confirmed L4 compression fracture. Pain 5-6/10 when laying flat and very severe when sitting up (says unable to sit up because of this). Pain improved with Oxy 5 in ED    Plan  - Spine consult regarding surgical intervention  - Call Ok for TLSO Brace  - Pain regimen  Mild: Tylenol 650 q6hrs  Moderate: Oxycodone 2.5 q6hrs prn  Severe: Oxycodone 5 q6hrs prn   - PT eval   - Lidocaine patch Patient w/mechanical fall 2 weeks ago. Found to have L4 compression fracture on Xray. Did not take oxycodone and tramadol as PCP prescribed. Pain worsened and walking became very difficult. CT L-spine confirmed L4 compression fracture. Pain 5-6/10 when laying flat and very severe when sitting up (says unable to sit up because of this). Pain improved with Oxy 5 in ED    Plan  - f/u Spine recs. Pending CT C and T spine  - Pending TLSO brace  - c/w Pain regimen  Mild: Tylenol 650 q6hrs  Moderate: Oxycodone 2.5 q6hrs prn  Severe: Oxycodone 5 q6hrs prn   - PT eval

## 2025-04-13 NOTE — DISCHARGE NOTE PROVIDER - NSFOLLOWUPCLINICS_GEN_ALL_ED_FT
Patient
[facility] Orthopedic Surgery  Orthopedic Surgery  [address], 3rd & 4th [place] [address]  Phone: [phone]  Fax:     [facility] Orthopedic [place]  Orthopedics  .  [place]   Phone: [phone]  Fax:     [facility]  Orthopedic Surgery  [address] [address]  Phone: [phone]  Fax:

## 2025-04-13 NOTE — PROGRESS NOTE ADULT - PROBLEM SELECTOR PLAN 5
Hx of CVA 15 years ago w/residual L sided deficits. On Plavix    Plan  - c/w Plavix K at 2.9 in ED. s/p repletion w/KCL 40 and Potassium Phosphate 10.  Improved to 3.5    Plan  - Trend BMP

## 2025-04-13 NOTE — PHYSICAL THERAPY INITIAL EVALUATION ADULT - MANUAL MUSCLE TESTING RESULTS, REHAB EVAL
left side muscle strength grossly at least 3/5 Throughout; right side muscle strength grossly at least 3+/5 throughout.

## 2025-04-13 NOTE — PROGRESS NOTE ADULT - PROBLEM SELECTOR PLAN 10
Hx of Chronic Leg swelling on Lasix 20 qd.     Plan  - Lasix prn if patient experiences leg swelling Hx of recurrent UTI. Last UTI in January    Plan  - Monitor

## 2025-04-13 NOTE — PROGRESS NOTE ADULT - PROBLEM SELECTOR PLAN 11
DVT: Heparin subq  Diet: DASH/TLC  Dispo: Pending clinical course and PT eval Hx of Chronic Leg swelling on Lasix 20 qd.     Plan  - Lasix prn if patient experiences leg swelling

## 2025-04-13 NOTE — OCCUPATIONAL THERAPY INITIAL EVALUATION ADULT - GENERAL OBSERVATIONS, REHAB EVAL
Patient found semi-reclined in bed, NAD, and able to follow simple directions. Vitals: HR 58 BPM. Patient agreeable to participate in skilled OT evaluation.

## 2025-04-13 NOTE — PROGRESS NOTE ADULT - SUBJECTIVE AND OBJECTIVE BOX
Patient is a 84y old  Female who presents with a chief complaint of Mechanical Fall w/known L4 Compression Fracture (12 Apr 2025 18:34)      SUBJECTIVE / OVERNIGHT EVENTS:    MEDICATIONS  (STANDING):  atorvastatin 20 milliGRAM(s) Oral at bedtime  calcitriol   Capsule 0.25 MICROGram(s) Oral daily  clopidogrel Tablet 75 milliGRAM(s) Oral daily  dextrose 5%. 1000 milliLiter(s) (50 mL/Hr) IV Continuous <Continuous>  dextrose 5%. 1000 milliLiter(s) (100 mL/Hr) IV Continuous <Continuous>  dextrose 50% Injectable 25 Gram(s) IV Push once  dextrose 50% Injectable 12.5 Gram(s) IV Push once  dextrose 50% Injectable 25 Gram(s) IV Push once  glucagon  Injectable 1 milliGRAM(s) IntraMuscular once  insulin lispro (ADMELOG) corrective regimen sliding scale   SubCutaneous three times a day before meals  insulin lispro (ADMELOG) corrective regimen sliding scale   SubCutaneous at bedtime  isosorbide   mononitrate ER Tablet (IMDUR) 30 milliGRAM(s) Oral daily  losartan 25 milliGRAM(s) Oral daily  metoprolol succinate ER 25 milliGRAM(s) Oral daily  NIFEdipine XL 60 milliGRAM(s) Oral daily    MEDICATIONS  (PRN):  acetaminophen     Tablet .. 650 milliGRAM(s) Oral every 6 hours PRN Temp greater or equal to 38C (100.4F), Mild Pain (1 - 3)  aluminum hydroxide/magnesium hydroxide/simethicone Suspension 30 milliLiter(s) Oral every 4 hours PRN Dyspepsia  dextrose Oral Gel 15 Gram(s) Oral once PRN Blood Glucose LESS THAN 70 milliGRAM(s)/deciliter  oxyCODONE    IR 2.5 milliGRAM(s) Oral every 6 hours PRN Moderate Pain (4 - 6)  oxyCODONE    IR 5 milliGRAM(s) Oral every 6 hours PRN Severe Pain (7 - 10)      CAPILLARY BLOOD GLUCOSE      POCT Blood Glucose.: 157 mg/dL (12 Apr 2025 23:00)  POCT Blood Glucose.: 155 mg/dL (12 Apr 2025 17:47)  POCT Blood Glucose.: 238 mg/dL (12 Apr 2025 12:30)  POCT Blood Glucose.: 107 mg/dL (12 Apr 2025 08:42)    I&O's Summary      Vital Signs Last 24 Hrs  T(C): 36.6 (13 Apr 2025 05:45), Max: 37.2 (12 Apr 2025 15:30)  T(F): 97.8 (13 Apr 2025 05:45), Max: 99 (12 Apr 2025 15:30)  HR: 68 (13 Apr 2025 05:45) (68 - 70)  BP: 159/77 (13 Apr 2025 05:45) (153/65 - 180/91)  BP(mean): --  RR: 18 (13 Apr 2025 05:45) (17 - 18)  SpO2: 100% (13 Apr 2025 05:45) (98% - 100%)    Parameters below as of 13 Apr 2025 05:45  Patient On (Oxygen Delivery Method): room air        PHYSICAL EXAM:  GENERAL: NAD, well-developed  HEAD: Atraumatic, Normocephalic  EYES: EOMI, PERRLA, conjunctiva and sclera clear  NECK: Supple, No JVD  CHEST/LUNG: Clear to auscultation bilaterally; No wheezes or crackles  HEART: Normal S1/S2; Regular rate and rhythm; No murmurs, rubs, or gallops  ABDOMEN: Soft, Nontender, Nondistended; Bowel sounds present  EXTREMITIES: 2+ Peripheral Pulses; No clubbing, cyanosis, or edema  PSYCH: A&Ox3  NEUROLOGY: no focal neurologic deficit  SKIN: No rashes or lesions    LABS:                        12.5   6.67  )-----------( 202      ( 13 Apr 2025 05:35 )             36.3      04-12    141  |  106  |  19  ----------------------------<  190[H]  3.8   |  24  |  0.82    Ca    9.2      12 Apr 2025 17:14  Phos  2.2     04-12  Mg     2.10     04-12    TPro  7.5  /  Alb  3.7  /  TBili  0.4  /  DBili  x   /  AST  834[H]  /  ALT  374[H]  /  AlkPhos  236[H]  04-12    PT/INR - ( 12 Apr 2025 16:45 )   PT: 10.8 sec;   INR: 0.91 ratio         PTT - ( 12 Apr 2025 16:45 )  PTT:29.5 sec      Urinalysis Basic - ( 12 Apr 2025 17:14 )    Color: x / Appearance: x / SG: x / pH: x  Gluc: 190 mg/dL / Ketone: x  / Bili: x / Urobili: x   Blood: x / Protein: x / Nitrite: x   Leuk Esterase: x / RBC: x / WBC x   Sq Epi: x / Non Sq Epi: x / Bacteria: x        RADIOLOGY & ADDITIONAL TESTS:    Imaging Personally Reviewed:    Consultant(s) Notes Reviewed:      Care Discussed with Consultants/Other Providers:   Patient is a 84y old  Female who presents with a chief complaint of Mechanical Fall w/known L4 Compression Fracture (12 Apr 2025 18:34)      SUBJECTIVE / OVERNIGHT EVENTS:    No overnight events. Pain is improving    Patient says pain is improved to 4/10.    MEDICATIONS  (STANDING):  atorvastatin 20 milliGRAM(s) Oral at bedtime  calcitriol   Capsule 0.25 MICROGram(s) Oral daily  clopidogrel Tablet 75 milliGRAM(s) Oral daily  dextrose 5%. 1000 milliLiter(s) (50 mL/Hr) IV Continuous <Continuous>  dextrose 5%. 1000 milliLiter(s) (100 mL/Hr) IV Continuous <Continuous>  dextrose 50% Injectable 25 Gram(s) IV Push once  dextrose 50% Injectable 12.5 Gram(s) IV Push once  dextrose 50% Injectable 25 Gram(s) IV Push once  glucagon  Injectable 1 milliGRAM(s) IntraMuscular once  insulin lispro (ADMELOG) corrective regimen sliding scale   SubCutaneous three times a day before meals  insulin lispro (ADMELOG) corrective regimen sliding scale   SubCutaneous at bedtime  isosorbide   mononitrate ER Tablet (IMDUR) 30 milliGRAM(s) Oral daily  losartan 25 milliGRAM(s) Oral daily  metoprolol succinate ER 25 milliGRAM(s) Oral daily  NIFEdipine XL 60 milliGRAM(s) Oral daily    MEDICATIONS  (PRN):  acetaminophen     Tablet .. 650 milliGRAM(s) Oral every 6 hours PRN Temp greater or equal to 38C (100.4F), Mild Pain (1 - 3)  aluminum hydroxide/magnesium hydroxide/simethicone Suspension 30 milliLiter(s) Oral every 4 hours PRN Dyspepsia  dextrose Oral Gel 15 Gram(s) Oral once PRN Blood Glucose LESS THAN 70 milliGRAM(s)/deciliter  oxyCODONE    IR 2.5 milliGRAM(s) Oral every 6 hours PRN Moderate Pain (4 - 6)  oxyCODONE    IR 5 milliGRAM(s) Oral every 6 hours PRN Severe Pain (7 - 10)      CAPILLARY BLOOD GLUCOSE      POCT Blood Glucose.: 157 mg/dL (12 Apr 2025 23:00)  POCT Blood Glucose.: 155 mg/dL (12 Apr 2025 17:47)  POCT Blood Glucose.: 238 mg/dL (12 Apr 2025 12:30)  POCT Blood Glucose.: 107 mg/dL (12 Apr 2025 08:42)    I&O's Summary      Vital Signs Last 24 Hrs  T(C): 36.6 (13 Apr 2025 05:45), Max: 37.2 (12 Apr 2025 15:30)  T(F): 97.8 (13 Apr 2025 05:45), Max: 99 (12 Apr 2025 15:30)  HR: 68 (13 Apr 2025 05:45) (68 - 70)  BP: 159/77 (13 Apr 2025 05:45) (153/65 - 180/91)  BP(mean): --  RR: 18 (13 Apr 2025 05:45) (17 - 18)  SpO2: 100% (13 Apr 2025 05:45) (98% - 100%)    Parameters below as of 13 Apr 2025 05:45  Patient On (Oxygen Delivery Method): room air        PHYSICAL EXAM:  GENERAL: NAD, well-developed  HEAD: Atraumatic, Normocephalic  EYES: EOMI, PERRLA, conjunctiva and sclera clear  NECK: Supple, No JVD  CHEST/LUNG: Clear to auscultation bilaterally; No wheezes or crackles  HEART: Normal S1/S2; Regular rate and rhythm; No murmurs, rubs, or gallops  ABDOMEN: Soft, Nontender, Nondistended; Bowel sounds present  Back: Spinal tenderness at L4  EXTREMITIES: 2+ Peripheral Pulses; No clubbing, cyanosis, or edema  PSYCH: A&Ox3  NEUROLOGY: no focal neurologic deficit  SKIN: No rashes or lesions    LABS:                        12.5   6.67  )-----------( 202      ( 13 Apr 2025 05:35 )             36.3      04-12    141  |  106  |  19  ----------------------------<  190[H]  3.8   |  24  |  0.82    Ca    9.2      12 Apr 2025 17:14  Phos  2.2     04-12  Mg     2.10     04-12    TPro  7.5  /  Alb  3.7  /  TBili  0.4  /  DBili  x   /  AST  834[H]  /  ALT  374[H]  /  AlkPhos  236[H]  04-12    PT/INR - ( 12 Apr 2025 16:45 )   PT: 10.8 sec;   INR: 0.91 ratio         PTT - ( 12 Apr 2025 16:45 )  PTT:29.5 sec      Urinalysis Basic - ( 12 Apr 2025 17:14 )    Color: x / Appearance: x / SG: x / pH: x  Gluc: 190 mg/dL / Ketone: x  / Bili: x / Urobili: x   Blood: x / Protein: x / Nitrite: x   Leuk Esterase: x / RBC: x / WBC x   Sq Epi: x / Non Sq Epi: x / Bacteria: x        RADIOLOGY & ADDITIONAL TESTS:    Imaging Personally Reviewed:    Consultant(s) Notes Reviewed:      Care Discussed with Consultants/Other Providers:   Patient is a 84y old  Female who presents with a chief complaint of Mechanical Fall w/known L4 Compression Fracture (12 Apr 2025 18:34)    SUBJECTIVE / OVERNIGHT EVENTS:    No overnight events. Pain is improving    Patient says pain is improved to 4/10.    MEDICATIONS  (STANDING):  atorvastatin 20 milliGRAM(s) Oral at bedtime  calcitriol   Capsule 0.25 MICROGram(s) Oral daily  clopidogrel Tablet 75 milliGRAM(s) Oral daily  dextrose 5%. 1000 milliLiter(s) (50 mL/Hr) IV Continuous <Continuous>  dextrose 5%. 1000 milliLiter(s) (100 mL/Hr) IV Continuous <Continuous>  dextrose 50% Injectable 25 Gram(s) IV Push once  dextrose 50% Injectable 12.5 Gram(s) IV Push once  dextrose 50% Injectable 25 Gram(s) IV Push once  glucagon  Injectable 1 milliGRAM(s) IntraMuscular once  insulin lispro (ADMELOG) corrective regimen sliding scale   SubCutaneous three times a day before meals  insulin lispro (ADMELOG) corrective regimen sliding scale   SubCutaneous at bedtime  isosorbide   mononitrate ER Tablet (IMDUR) 30 milliGRAM(s) Oral daily  losartan 25 milliGRAM(s) Oral daily  metoprolol succinate ER 25 milliGRAM(s) Oral daily  NIFEdipine XL 60 milliGRAM(s) Oral daily    MEDICATIONS  (PRN):  acetaminophen     Tablet .. 650 milliGRAM(s) Oral every 6 hours PRN Temp greater or equal to 38C (100.4F), Mild Pain (1 - 3)  aluminum hydroxide/magnesium hydroxide/simethicone Suspension 30 milliLiter(s) Oral every 4 hours PRN Dyspepsia  dextrose Oral Gel 15 Gram(s) Oral once PRN Blood Glucose LESS THAN 70 milliGRAM(s)/deciliter  oxyCODONE    IR 2.5 milliGRAM(s) Oral every 6 hours PRN Moderate Pain (4 - 6)  oxyCODONE    IR 5 milliGRAM(s) Oral every 6 hours PRN Severe Pain (7 - 10)      CAPILLARY BLOOD GLUCOSE  POCT Blood Glucose.: 157 mg/dL (12 Apr 2025 23:00)  POCT Blood Glucose.: 155 mg/dL (12 Apr 2025 17:47)  POCT Blood Glucose.: 238 mg/dL (12 Apr 2025 12:30)  POCT Blood Glucose.: 107 mg/dL (12 Apr 2025 08:42)    Vital Signs Last 24 Hrs  T(C): 36.6 (13 Apr 2025 05:45), Max: 37.2 (12 Apr 2025 15:30)  T(F): 97.8 (13 Apr 2025 05:45), Max: 99 (12 Apr 2025 15:30)  HR: 68 (13 Apr 2025 05:45) (68 - 70)  BP: 159/77 (13 Apr 2025 05:45) (153/65 - 180/91)  RR: 18 (13 Apr 2025 05:45) (17 - 18)  SpO2: 100% (13 Apr 2025 05:45) (98% - 100%)    Parameters below as of 13 Apr 2025 05:45  Patient On (Oxygen Delivery Method): room air    PHYSICAL EXAM:  GENERAL: NAD, well-developed  HEAD: Atraumatic, Normocephalic  EYES: EOMI, PERRLA, conjunctiva and sclera clear  NECK: Supple, No JVD  CHEST/LUNG: Clear to auscultation bilaterally; No wheezes or crackles  HEART: Normal S1/S2; Regular rate and rhythm; No murmurs, rubs, or gallops  ABDOMEN: Soft, Nontender, Nondistended; Bowel sounds present  Back: Spinal tenderness at L4  EXTREMITIES: 2+ Peripheral Pulses; No clubbing, cyanosis, or edema  PSYCH: A&Ox3  NEUROLOGY: no focal neurologic deficit  SKIN: No rashes or lesions    LABS:                        12.5   6.67  )-----------( 202      ( 13 Apr 2025 05:35 )             36.3      04-12    141  |  106  |  19  ----------------------------<  190[H]  3.8   |  24  |  0.82    Ca    9.2      12 Apr 2025 17:14  Phos  2.2     04-12  Mg     2.10     04-12    TPro  7.5  /  Alb  3.7  /  TBili  0.4  /  DBili  x   /  AST  834[H]  /  ALT  374[H]  /  AlkPhos  236[H]  04-12    PT/INR - ( 12 Apr 2025 16:45 )   PT: 10.8 sec;   INR: 0.91 ratio    PTT - ( 12 Apr 2025 16:45 )  PTT:29.5 sec      Care Discussed with Consultants/Other Providers:

## 2025-04-13 NOTE — PROGRESS NOTE ADULT - PROBLEM SELECTOR PLAN 4
K at 2.9 in ED. s/p repletion w/KCL 40 and Potassium Phosphate 10.     Plan  - Trend BMP Hx of T2DM. On Tradjenta 5 qd, Glipizide ER 10mg qd.     Plan  - A1C in AM  - Low ISS  - Hold home PO meds

## 2025-04-13 NOTE — PHYSICAL THERAPY INITIAL EVALUATION ADULT - GENERAL OBSERVATIONS, REHAB EVAL
Consult received, chart reviewed. Patient received in semi-supine, no apparent distress, HR 68. Pt. agreeable to participate in PT.

## 2025-04-13 NOTE — DISCHARGE NOTE PROVIDER - NSDCMRMEDTOKEN_GEN_ALL_CORE_FT
alendronate 70 mg oral tablet: 1 tab(s) orally once a week  calcitriol 0.25 mcg oral capsule: 1 cap(s) orally once a day  Calcium Plus Vitamin D3 600 mg-10 mcg (400 intl units) oral tablet: 1 tab(s) orally once a day  clopidogrel 75 mg oral tablet: 1 tab(s) orally once a day  glipiZIDE 10 mg oral tablet, extended release: 1 tab(s) orally once a day  Imdur 30 mg oral tablet, extended release: 1 tab(s) orally once a day  Kerendia 10 mg oral tablet: 1 tab(s) orally once a day  Lasix 20 mg oral tablet: 1 tab(s) orally once a day  Linzess 145 mcg oral capsule: 1 cap(s) orally once a day  Lipitor 20 mg oral tablet: 1 tab(s) orally  losartan 25 mg oral tablet: 1 tab(s) orally once a day  metoprolol succinate 25 mg oral tablet, extended release: 1 tab(s) orally once a day  Nephplex Rx oral tablet: 1 tab(s) orally  Tradjenta 5 mg oral tablet: 1 tab(s) orally  Vascepa 1 g oral capsule: 2 cap(s) orally 2 times a day   alendronate 70 mg oral tablet: 1 tab(s) orally once a week  calcitriol 0.25 mcg oral capsule: 1 cap(s) orally once a day  Calcium Plus Vitamin D3 600 mg-10 mcg (400 intl units) oral tablet: 1 tab(s) orally once a day  clopidogrel 75 mg oral tablet: 1 tab(s) orally once a day  glipiZIDE 10 mg oral tablet, extended release: 1 tab(s) orally once a day  Imdur 30 mg oral tablet, extended release: 1 tab(s) orally once a day  Kerendia 10 mg oral tablet: 1 tab(s) orally once a day  Linzess 145 mcg oral capsule: 1 cap(s) orally once a day  losartan 25 mg oral tablet: 1 tab(s) orally once a day  metoprolol succinate 25 mg oral tablet, extended release: 1 tab(s) orally once a day  Nephplex Rx oral tablet: 1 tab(s) orally  NIFEdipine 60 mg oral tablet, extended release: 1 tab(s) orally once a day  Vascepa 1 g oral capsule: 2 cap(s) orally 2 times a day   alendronate 70 mg oral tablet: 1 tab(s) orally once a week  calcitriol 0.25 mcg oral capsule: 1 cap(s) orally once a day  Calcium Plus Vitamin D3 600 mg-10 mcg (400 intl units) oral tablet: 1 tab(s) orally once a day  clopidogrel 75 mg oral tablet: 1 tab(s) orally once a day  glipiZIDE 10 mg oral tablet, extended release: 1 tab(s) orally once a day  Home Physical Therapy: Evaluate and Treat  Imdur 30 mg oral tablet, extended release: 1 tab(s) orally once a day  Kerendia 10 mg oral tablet: 1 tab(s) orally once a day  Linzess 145 mcg oral capsule: 1 cap(s) orally once a day  losartan 25 mg oral tablet: 1 tab(s) orally once a day  metoprolol succinate 25 mg oral tablet, extended release: 1 tab(s) orally once a day  Nephplex Rx oral tablet: 1 tab(s) orally  NIFEdipine 60 mg oral tablet, extended release: 1 tab(s) orally once a day  Oxaydo 5 mg oral tablet: 1 tab(s) orally every 6 hours as needed for Severe Pain (7 - 10) MDD: 20 mg  Vascepa 1 g oral capsule: 2 cap(s) orally 2 times a day

## 2025-04-13 NOTE — PROGRESS NOTE ADULT - PROBLEM SELECTOR PLAN 8
Hx of Osteoporosis on Alendronate 70 weekly and Calcitriol 0.25 qd    Plan  - Hold Alendronate  - c/w Calcitriol Hx of CKD. SCr on admission 0.94. On Kerendia.    Plan  - Trend SCr  - c/w Kerendia (if in formulary)

## 2025-04-13 NOTE — PROGRESS NOTE ADULT - PROBLEM SELECTOR PLAN 9
Hx of recurrent UTI. Last UTI in January    Plan  - Monitor Hx of Osteoporosis on Alendronate 70 weekly and Calcitriol 0.25 qd    Plan  - Hold Alendronate  - c/w Calcitriol

## 2025-04-13 NOTE — OCCUPATIONAL THERAPY INITIAL EVALUATION ADULT - PERTINENT HX OF CURRENT PROBLEM, REHAB EVAL
Pt is an 84-year-old female, who presented to the ED with worsening back pain. Pt fell 2 weeks ago, pain worsened, gait impaired, went to PCP. Found to have L4 compression fracture on XR. Pain worsened over past few days and patient was unable to walk. In ED, CT L Spine confirmed L4 compression fracture. Here for pain management and TLSO brace.

## 2025-04-13 NOTE — DISCHARGE NOTE PROVIDER - NSDCCPCAREPLAN_GEN_ALL_CORE_FT
PRINCIPAL DISCHARGE DIAGNOSIS  Diagnosis: Compression fracture of L4 vertebra  Assessment and Plan of Treatment: You came to the hospital after a fall two weeks ago that caused worsening back pain. You were found to have a compression fracture of your Lumbar Spine. You received pain medication to help with the back pain. You received a brace to help with your lumbar fracture. You were evaluated by the Orthopedic team, who recommended CT scan of the rest of your spine, which did not show any other fractures. You were evaluated by Physical Therapy, who indicated that you should undergo home physical therapy. Please follow up with your PCP regarding this. If you experience worsening back pain, loss of sensation near your hips and buttocks, inability to hold in urine or stool, please go to the nearest Emergency Room.     PRINCIPAL DISCHARGE DIAGNOSIS  Diagnosis: Compression fracture of L4 vertebra  Assessment and Plan of Treatment: You came to the hospital after a fall two weeks ago that caused worsening back pain. You were found to have a compression fracture of your Lumbar Spine. You received pain medication to help with the back pain. You received a brace to help with your lumbar fracture. You were evaluated by the Orthopedic team, who recommended CT scan of the rest of your spine, which did not show any other fractures. You were evaluated by Physical Therapy, who indicated that you should undergo home physical therapy. Please follow up with your PCP regarding this. If you experience worsening back pain, loss of sensation near your hips and buttocks, inability to hold in urine or stool, please go to the nearest Emergency Room.      SECONDARY DISCHARGE DIAGNOSES  Diagnosis: Transaminitis  Assessment and Plan of Treatment: During this hospitalization your liver enzymes became elevated. An ultrasound was performed that showed no acute liver abnormalitiy. This could have been due to the acute illness combined with medications such as tylenol and statin medicaiton for cholesterol.   Your liver enzymes are now improving  Please follow up with your PCP monitor your live enyzmes.   For now please stop taking lipitor/atorvastatin and be sure to follow up with your PCP Dr. Shelton to monitor your live enyzmes and adjust medications.    Diagnosis: Hypertension  Assessment and Plan of Treatment: You had slightly elevated blood pressure during this admission. This could be due to a combination of elevated blood pressure from pain and your diuretic lasix was also stopped.   Lasix was stopped because you have decreased potassium, which is a sideeffect of lasix. Please follow up with your PCP to monitor blood pressure and make changes to your hypertension medication.  If you experience significant headache, blurred vision, chest pain, shortness of breath, dizziness, signficant nausea/vomitting, or confussion please come back to the emergency room.     PRINCIPAL DISCHARGE DIAGNOSIS  Diagnosis: Compression fracture of L4 vertebra  Assessment and Plan of Treatment: You came to the hospital after a fall two weeks ago that caused worsening back pain. You were found to have a compression fracture of your Lumbar Spine. You received pain medication to help with the back pain. You received a brace to help with your lumbar fracture. You were evaluated by the Orthopedic team, who recommended CT scan of the rest of your spine, which did not show any other fractures. You were evaluated by Physical Therapy, who indicated that you should undergo home physical therapy. Please follow up with your PCP regarding this. If you experience worsening back pain, loss of sensation near your hips and buttocks, inability to hold in urine or stool, please go to the nearest Emergency Room.      SECONDARY DISCHARGE DIAGNOSES  Diagnosis: Transaminitis  Assessment and Plan of Treatment: During this hospitalization your liver enzymes became elevated. An ultrasound was performed that showed no acute liver abnormalitiy. This could have been due to the acute illness combined with medications such as tylenol and statin medicaiton for cholesterol.   Your liver enzymes are now improving  Please follow up with your PCP monitor your live enyzmes.   For now please stop taking lipitor/atorvastatin and be sure to follow up with your PCP Dr. Shelton in 1-2 weeks to monitor your liver enyzmes and adjust medications.    Diagnosis: Hypertension  Assessment and Plan of Treatment: You had slightly elevated blood pressure during this admission. This could be due to a combination of elevated blood pressure from pain and your diuretic lasix was also stopped.   Lasix was stopped because you have decreased potassium, which is a sideeffect of lasix. Please follow up with your PCP to monitor blood pressure and make changes to your hypertension medication.  If you experience significant headache, blurred vision, chest pain, shortness of breath, dizziness, signficant nausea/vomitting, or confussion please come back to the emergency room.

## 2025-04-13 NOTE — OCCUPATIONAL THERAPY INITIAL EVALUATION ADULT - LUE MMT, REHAB EVAL
[facility] [place]  Nik Calles DO    Name: Paul Win  YOB: 3+/5

## 2025-04-13 NOTE — DISCHARGE NOTE PROVIDER - HOSPITAL COURSE
HPI:  84-year-old female, history hypertension, diabetes, recent UTIs, on Lasix for swelling, CVA with residual L sided deficits who presents to ED with worsening low back pain over the past 2 weeks. She had a fall 2 weeks ago. She slipped and fell on her bottom, hitting her head along the way. She experienced some pain and swelling of head, which improved after two days. She initially did not experience much back pain, but the pain progressed over the past week. 4 days ago, her daughter took her he to the PCP, and she was found to have an L4 compression fracture. She was given oxycodone and tramadol. However, she only took Tramadol once and Tylenol. Over the past few days, she has been unable to walk as usual and the pain has worsened. The pain was 8/10 yesterday and her BP was >160 systolic. Therefore, her daughter took her to the ED.     Upon arrival to the ED, VS significant for /72, which improved down to 138/63. She received Oxycodone, Tylenol, and Lidocaine patch for pain. Labs showed WBC 7.5, Hgb 11.6, K 2.9, SCr 0.94. Potassium was repleted. CT Lumbar Spine w/o contrast showed recent L4 compression fracture with approximately 40% height loss and minimal osseous retropulsion without significant central canal stenosis, as well as sponylolithesis.     Upon admission, the patient says she is experiencing 5-6/10 pain in her lower back while laying flat. The pain does not radiate anywhere. When she sits up, the pain becomes very severe, and she is thus unable to sit up. When she rolls to the site, the pain also worsens. She is not experiencing any other symptoms. She denies any trouble holding in her urine or bowels. She denies any saddle anesthesia. Her pain improved after given Oxycodone in ED.  (12 Apr 2025 11:53)    Hospital Course:  The patient was admitted to medicine and started on a pain regimen of Tylenol PO 650mg q6 for mild, Oxy 2.5 q6 for moderate, and Oxy 5 q6hrs for severe. Ortho spine was consulted, and they recommended CT C-spine and L-spine which were negative. The patient is pending TLSO brace. The patient's pain improved the following day. PT indicated that the patient should undergo home PT. The patient had transaminitis, and underwent RUQ US, which was significant only for past Cholecystectomy. The patient denied any abdominal pain. Her statin was held.      Important Medication Changes and Reason:  Hold Statin i/s/o Transamintis    Active or Pending Issues Requiring Follow-up:  [ ] f/u L4 Compression fracture w/PCP  [ ] f/u elevated LFTs w/PCP    Advanced Directives:   [X ] Full code  [ ] DNR  [ ] Hospice    Discharge Diagnoses:  L4 Compression Fracture         HPI:  84-year-old female, history hypertension, diabetes, recent UTIs, on Lasix for swelling, CVA with residual L sided deficits who presents to ED with worsening low back pain over the past 2 weeks. She had a fall 2 weeks ago. She slipped and fell on her bottom, hitting her head along the way. She experienced some pain and swelling of head, which improved after two days. She initially did not experience much back pain, but the pain progressed over the past week. 4 days ago, her daughter took her he to the PCP, and she was found to have an L4 compression fracture. She was given oxycodone and tramadol. However, she only took Tramadol once and Tylenol. Over the past few days, she has been unable to walk as usual and the pain has worsened. The pain was 8/10 yesterday and her BP was >160 systolic. Therefore, her daughter took her to the ED.     Upon arrival to the ED, VS significant for /72, which improved down to 138/63. She received Oxycodone, Tylenol, and Lidocaine patch for pain. Labs showed WBC 7.5, Hgb 11.6, K 2.9, SCr 0.94. Potassium was repleted. CT Lumbar Spine w/o contrast showed recent L4 compression fracture with approximately 40% height loss and minimal osseous retropulsion without significant central canal stenosis, as well as sponylolithesis.     Upon admission, the patient says she is experiencing 5-6/10 pain in her lower back while laying flat. The pain does not radiate anywhere. When she sits up, the pain becomes very severe, and she is thus unable to sit up. When she rolls to the site, the pain also worsens. She is not experiencing any other symptoms. She denies any trouble holding in her urine or bowels. She denies any saddle anesthesia. Her pain improved after given Oxycodone in ED.  (12 Apr 2025 11:53)    Hospital Course:  The patient was admitted to medicine and started on a pain regimen of Tylenol PO 650mg q6 for mild, Oxy 2.5 q6 for moderate, and Oxy 5 q6hrs for severe. Ortho spine was consulted, and they recommended CT C-spine and L-spine which were negative. The patient is pending TLSO brace. The patient's pain improved the following day. PT indicated that the patient should undergo home PT. The patient had transaminitis, and underwent RUQ US, which was significant only for past Cholecystectomy. The patient denied any abdominal pain. Her statin was held.      Important Medication Changes and Reason:  Hold Statin i/s/o Transamintis    Active or Pending Issues Requiring Follow-up:  [ ] f/u L4 Compression fracture w/PCP  [ ] f/u elevated LFTs w/PCP    Advanced Directives:   [X ] Full code  [ ] DNR  [ ] Hospice    Discharge Diagnoses:  L4 Compression Fracture  Transaminitis          HPI:  84-year-old female, history hypertension, diabetes, recent UTIs, on Lasix for swelling, CVA with residual L sided deficits who presents to ED with worsening low back pain over the past 2 weeks. She had a fall 2 weeks ago. She slipped and fell on her bottom, hitting her head along the way. She experienced some pain and swelling of head, which improved after two days. She initially did not experience much back pain, but the pain progressed over the past week. 4 days ago, her daughter took her he to the PCP, and she was found to have an L4 compression fracture. She was given oxycodone and tramadol. However, she only took Tramadol once and Tylenol. Over the past few days, she has been unable to walk as usual and the pain has worsened. The pain was 8/10 yesterday and her BP was >160 systolic. Therefore, her daughter took her to the ED.     Upon arrival to the ED, VS significant for /72, which improved down to 138/63. She received Oxycodone, Tylenol, and Lidocaine patch for pain. Labs showed WBC 7.5, Hgb 11.6, K 2.9, SCr 0.94. Potassium was repleted. CT Lumbar Spine w/o contrast showed recent L4 compression fracture with approximately 40% height loss and minimal osseous retropulsion without significant central canal stenosis, as well as sponylolithesis.     Upon admission, the patient says she is experiencing 5-6/10 pain in her lower back while laying flat. The pain does not radiate anywhere. When she sits up, the pain becomes very severe, and she is thus unable to sit up. When she rolls to the site, the pain also worsens. She is not experiencing any other symptoms. She denies any trouble holding in her urine or bowels. She denies any saddle anesthesia. Her pain improved after given Oxycodone in ED.  (12 Apr 2025 11:53)    Hospital Course:  The patient was admitted to medicine and started on a pain regimen of Tylenol PO 650mg q6 for mild, Oxy 2.5 q6 for moderate, and Oxy 5 q6hrs for severe. Ortho spine was consulted, and they recommended CT C-spine and L-spine which were negative. The patient is pending TLSO brace. The patient's pain improved the following day. PT indicated that the patient should undergo home PT. The patient had transaminitis, and underwent RUQ US, which was significant only for past Cholecystectomy. The patient denied any abdominal pain. Her statin was held and her LFTs downtrended.       Important Medication Changes and Reason:  Hold Statin i/s/o Transaminitis    Active or Pending Issues Requiring Follow-up:  [ ] f/u L4 Compression fracture w/PCP  [ ] f/u elevated LFTs w/PCP    Advanced Directives:   [X ] Full code  [ ] DNR  [ ] Hospice    Discharge Diagnoses:  L4 Compression Fracture  Transaminitis

## 2025-04-13 NOTE — PROGRESS NOTE ADULT - PROBLEM SELECTOR PLAN 7
Hx of CKD. SCr on admission 0.94. On Kerendia.    Plan  - Trend SCr  - c/w Kerendia (if in formulary) ?hx of HLD on Lipitor 20 qd and Vascepa 1g 2 caps BID    Plan  - Lipid Profile in AM  - c/w Lipitor   - c/w Vascepa (if in formulary).

## 2025-04-13 NOTE — DISCHARGE NOTE PROVIDER - NSDCCPTREATMENT_GEN_ALL_CORE_FT
PRINCIPAL PROCEDURE  Procedure: CT lumbar spine  Findings and Treatment:   There is a recent compression fracture involving the L4 superior endplate   with approximately 40% height loss. Minimal osseous retropulsion is seen   at this level without significant central canal stenosis. The disc space   and remaining vertebral body heights are grossly preserved. No   spondylolisthesis is identified. Mild L4 prevertebral soft tissue   infiltration is present. Multilevel anterior osteophytosis is noted.  No large disc herniation or significant central canalstenosis is   identified. No significant neural foraminal narrowing is appreciated.  Mild pelvic ascites is partially imaged. Nonobstructive right   nephrolithiasis and atherosclerotic changes are seen.  IMPRESSION:  Recent L4 compression fracture with approximately 40% height loss and   minimal osseous retropulsion without significant central canal stenosis.   Spondylolisthesis.  Partially imaged mild pelvic ascites, nonspecific.        SECONDARY PROCEDURE  Procedure: US abdomen RUQ  Findings and Treatment: TECHNIQUE: Sonography of the right upper quadrant.  FINDINGS:  Liver: Within normal limits.  Bile ducts: Common bile duct measures 9 mm, within normal limits for a   patient this age with prior cholecystectomy..  Gallbladder: Cholecystectomy.  Pancreas: Visualized portions are within normallimits.  Right kidney: 8.3 cm. No hydronephrosis. Cortical scarring.  Ascites: None.  IVC: Not well visualized.  IMPRESSION:  No acute abnormality.       PRINCIPAL PROCEDURE  Procedure: CT lumbar spine  Findings and Treatment:   There is a recent compression fracture involving the L4 superior endplate   with approximately 40% height loss. Minimal osseous retropulsion is seen   at this level without significant central canal stenosis. The disc space   and remaining vertebral body heights are grossly preserved. No   spondylolisthesis is identified. Mild L4 prevertebral soft tissue   infiltration is present. Multilevel anterior osteophytosis is noted.  No large disc herniation or significant central canalstenosis is   identified. No significant neural foraminal narrowing is appreciated.  Mild pelvic ascites is partially imaged. Nonobstructive right   nephrolithiasis and atherosclerotic changes are seen.  IMPRESSION:  Recent L4 compression fracture with approximately 40% height loss and   minimal osseous retropulsion without significant central canal stenosis.   Spondylolisthesis.  Partially imaged mild pelvic ascites, nonspecific.        SECONDARY PROCEDURE  Procedure: US abdomen RUQ  Findings and Treatment: TECHNIQUE: Sonography of the right upper quadrant.  FINDINGS:  Liver: Within normal limits.  Bile ducts: Common bile duct measures 9 mm, within normal limits for a   patient this age with prior cholecystectomy..  Gallbladder: Cholecystectomy.  Pancreas: Visualized portions are within normallimits.  Right kidney: 8.3 cm. No hydronephrosis. Cortical scarring.  Ascites: None.  IVC: Not well visualized.  IMPRESSION:  No acute abnormality.      Procedure: XR knee, 2 views  Findings and Treatment: Generalized osteopenia. No acute fracture. No dislocation. Joint spaces   are maintained. No knee effusion. Vascular calcification is present.

## 2025-04-13 NOTE — PROGRESS NOTE ADULT - PROBLEM SELECTOR PLAN 3
Hx of T2DM. On Tradjenta 5 qd, Glipizide ER 10mg qd.     Plan  - A1C in AM  - Low ISS  - Hold home PO meds Patient w/transaminitis. Patient did not endorse overusing Tylenol at home.    Plan  - Pending RUQ US  - Trend CMP

## 2025-04-13 NOTE — DISCHARGE NOTE PROVIDER - PROVIDER TOKENS
FREE:[LAST:[Cat],FIRST:[Jessenia],PHONE:[phone]],FAX:[phone]   [phone]    -],ADDRESS:[[address] [place] [address]],FOLLOWUP:[2 weeks],ESTABLISHEDPATIENT:[T]]

## 2025-04-13 NOTE — PHYSICAL THERAPY INITIAL EVALUATION ADULT - PERTINENT HX OF CURRENT PROBLEM, REHAB EVAL
84-year-old female, who presented to the ED with worsening back pain. Fell 2 weeks ago, pain worsened, gait impaired, went to PCP. Found to have L4 compression fracture on XR. Pain worsened over past few days and patient was unable to walk. In ED, CT L Spine confirmed L4 compression fracture. Here for pain management and TLSO brace. Pending PT eval.

## 2025-04-13 NOTE — PROGRESS NOTE ADULT - PROBLEM SELECTOR PLAN 2
Patient w/HTN on home meds Nifedipine 60 ER qd, Imdur 30 qd, Losartan 25 qd, Metoprolol Succinate 25 qd. BP elevated in ED >160 systolic. BP elevated upon admission    Plan  - Restart home BP meds  - Trend BP

## 2025-04-13 NOTE — PROGRESS NOTE ADULT - PROBLEM SELECTOR PLAN 6
?hx of HLD on Lipitor 20 qd and Vascepa 1g 2 caps BID    Plan  - Lipid Profile in AM  - c/w Lipitor   - c/w Vascepa (if in formulary). Hx of CVA 15 years ago w/residual L sided deficits. On Plavix    Plan  - c/w Plavix

## 2025-04-13 NOTE — PROGRESS NOTE ADULT - ASSESSMENT
84-year-old female, history hypertension, diabetes, recent UTIs, on Lasix for swelling, CVA with residual L-sided deficit who presented to the ED with worsening back pain. Fell 2 weeks ago, pain worsened, gait impaired, went to PCP. Found to have L4 compression fracture on XR. Pain worsened over past few days and patient was unable to walk. In ED, CT L Spine confirmed L4 compression fracture. Here for pain management and TLSO brace. Pending PT eval.  84-year-old female, history hypertension, diabetes, recent UTIs, on Lasix for swelling, CVA with residual L-sided deficit who presented to the ED with worsening back pain. Fell 2 weeks ago, pain worsened, gait impaired, went to PCP. Found to have L4 compression fracture on XR. Pain worsened over past few days and patient was unable to walk. In ED, CT L Spine confirmed L4 compression fracture. Here for pain management and TLSO brace. Pending PT eval. Pending CT C and T spine. Pending RUQ US for transaminitis.

## 2025-04-13 NOTE — DISCHARGE NOTE PROVIDER - CARE PROVIDER_API CALL
Jessenia Shelton  [address] [place] [address]  Phone: [phone]  Fax: (   )    -  Established Patient  Follow Up Time: 2 weeks

## 2025-04-14 ENCOUNTER — TRANSCRIPTION ENCOUNTER (OUTPATIENT)
Age: 85
End: 2025-04-14

## 2025-04-14 VITALS
HEART RATE: 70 BPM | SYSTOLIC BLOOD PRESSURE: 170 MMHG | DIASTOLIC BLOOD PRESSURE: 70 MMHG | OXYGEN SATURATION: 100 % | TEMPERATURE: 98 F | RESPIRATION RATE: 18 BRPM

## 2025-04-14 LAB
ALBUMIN SERPL ELPH-MCNC: 3.8 G/DL — SIGNIFICANT CHANGE UP (ref 3.3–5)
ALP SERPL-CCNC: 195 U/L — HIGH (ref 40–120)
ALT FLD-CCNC: 166 U/L — HIGH (ref 4–33)
ANION GAP SERPL CALC-SCNC: 12 MMOL/L — SIGNIFICANT CHANGE UP (ref 7–14)
AST SERPL-CCNC: 96 U/L — HIGH (ref 4–32)
BILIRUB SERPL-MCNC: 0.5 MG/DL — SIGNIFICANT CHANGE UP (ref 0.2–1.2)
BUN SERPL-MCNC: 21 MG/DL — SIGNIFICANT CHANGE UP (ref 7–23)
CALCIUM SERPL-MCNC: 9.5 MG/DL — SIGNIFICANT CHANGE UP (ref 8.4–10.5)
CHLORIDE SERPL-SCNC: 106 MMOL/L — SIGNIFICANT CHANGE UP (ref 98–107)
CO2 SERPL-SCNC: 23 MMOL/L — SIGNIFICANT CHANGE UP (ref 22–31)
CREAT SERPL-MCNC: 0.76 MG/DL — SIGNIFICANT CHANGE UP (ref 0.5–1.3)
EGFR: 78 ML/MIN/1.73M2 — SIGNIFICANT CHANGE UP
EGFR: 78 ML/MIN/1.73M2 — SIGNIFICANT CHANGE UP
GLUCOSE BLDC GLUCOMTR-MCNC: 162 MG/DL — HIGH (ref 70–99)
GLUCOSE BLDC GLUCOMTR-MCNC: 176 MG/DL — HIGH (ref 70–99)
GLUCOSE BLDC GLUCOMTR-MCNC: 196 MG/DL — HIGH (ref 70–99)
GLUCOSE SERPL-MCNC: 163 MG/DL — HIGH (ref 70–99)
HCT VFR BLD CALC: 37.3 % — SIGNIFICANT CHANGE UP (ref 34.5–45)
HGB BLD-MCNC: 13 G/DL — SIGNIFICANT CHANGE UP (ref 11.5–15.5)
MAGNESIUM SERPL-MCNC: 2.2 MG/DL — SIGNIFICANT CHANGE UP (ref 1.6–2.6)
MCHC RBC-ENTMCNC: 30.8 PG — SIGNIFICANT CHANGE UP (ref 27–34)
MCHC RBC-ENTMCNC: 34.9 G/DL — SIGNIFICANT CHANGE UP (ref 32–36)
MCV RBC AUTO: 88.4 FL — SIGNIFICANT CHANGE UP (ref 80–100)
NRBC # BLD AUTO: 0 K/UL — SIGNIFICANT CHANGE UP (ref 0–0)
NRBC # FLD: 0 K/UL — SIGNIFICANT CHANGE UP (ref 0–0)
NRBC BLD AUTO-RTO: 0 /100 WBCS — SIGNIFICANT CHANGE UP (ref 0–0)
PHOSPHATE SERPL-MCNC: 3 MG/DL — SIGNIFICANT CHANGE UP (ref 2.5–4.5)
PLATELET # BLD AUTO: 229 K/UL — SIGNIFICANT CHANGE UP (ref 150–400)
POTASSIUM SERPL-MCNC: 3.7 MMOL/L — SIGNIFICANT CHANGE UP (ref 3.5–5.3)
POTASSIUM SERPL-SCNC: 3.7 MMOL/L — SIGNIFICANT CHANGE UP (ref 3.5–5.3)
PROT SERPL-MCNC: 7.7 G/DL — SIGNIFICANT CHANGE UP (ref 6–8.3)
RBC # BLD: 4.22 M/UL — SIGNIFICANT CHANGE UP (ref 3.8–5.2)
RBC # FLD: 13.4 % — SIGNIFICANT CHANGE UP (ref 10.3–14.5)
SODIUM SERPL-SCNC: 141 MMOL/L — SIGNIFICANT CHANGE UP (ref 135–145)
WBC # BLD: 8.63 K/UL — SIGNIFICANT CHANGE UP (ref 3.8–10.5)
WBC # FLD AUTO: 8.63 K/UL — SIGNIFICANT CHANGE UP (ref 3.8–10.5)

## 2025-04-14 PROCEDURE — 99239 HOSP IP/OBS DSCHRG MGMT >30: CPT | Mod: GC

## 2025-04-14 PROCEDURE — 73560 X-RAY EXAM OF KNEE 1 OR 2: CPT | Mod: 26,LT

## 2025-04-14 RX ORDER — LINAGLIPTIN 5 MG/1
1 TABLET, FILM COATED ORAL
Refills: 0 | DISCHARGE

## 2025-04-14 RX ORDER — MUPIROCIN CALCIUM 20 MG/G
1 CREAM TOPICAL
Refills: 0 | Status: DISCONTINUED | OUTPATIENT
Start: 2025-04-14 | End: 2025-04-14

## 2025-04-14 RX ORDER — OXYCODONE HYDROCHLORIDE 30 MG/1
1 TABLET ORAL
Qty: 12 | Refills: 0
Start: 2025-04-14 | End: 2025-04-16

## 2025-04-14 RX ORDER — ATORVASTATIN CALCIUM 80 MG/1
1 TABLET, FILM COATED ORAL
Refills: 0 | DISCHARGE

## 2025-04-14 RX ORDER — FUROSEMIDE 10 MG/ML
1 INJECTION INTRAMUSCULAR; INTRAVENOUS
Refills: 0 | DISCHARGE

## 2025-04-14 RX ORDER — NIFEDIPINE 30 MG
1 TABLET, EXTENDED RELEASE 24 HR ORAL
Qty: 0 | Refills: 0 | DISCHARGE
Start: 2025-04-14

## 2025-04-14 RX ADMIN — CLOPIDOGREL BISULFATE 75 MILLIGRAM(S): 75 TABLET, FILM COATED ORAL at 12:32

## 2025-04-14 RX ADMIN — Medication 60 MILLIGRAM(S): at 06:19

## 2025-04-14 RX ADMIN — INSULIN LISPRO 1: 100 INJECTION, SOLUTION INTRAVENOUS; SUBCUTANEOUS at 12:32

## 2025-04-14 RX ADMIN — METOPROLOL SUCCINATE 25 MILLIGRAM(S): 50 TABLET, EXTENDED RELEASE ORAL at 06:19

## 2025-04-14 RX ADMIN — LOSARTAN POTASSIUM 25 MILLIGRAM(S): 100 TABLET, FILM COATED ORAL at 06:18

## 2025-04-14 RX ADMIN — INSULIN LISPRO 1: 100 INJECTION, SOLUTION INTRAVENOUS; SUBCUTANEOUS at 09:08

## 2025-04-14 RX ADMIN — HEPARIN SODIUM 5000 UNIT(S): 1000 INJECTION INTRAVENOUS; SUBCUTANEOUS at 06:20

## 2025-04-14 RX ADMIN — CALCITRIOL 0.25 MICROGRAM(S): 0.5 CAPSULE, GELATIN COATED ORAL at 12:32

## 2025-04-14 RX ADMIN — INSULIN LISPRO 1: 100 INJECTION, SOLUTION INTRAVENOUS; SUBCUTANEOUS at 17:42

## 2025-04-14 RX ADMIN — ISOSORBIDE MONONITRATE 30 MILLIGRAM(S): 60 TABLET, EXTENDED RELEASE ORAL at 12:33

## 2025-04-14 NOTE — PROGRESS NOTE ADULT - PROBLEM SELECTOR PLAN 8
Hx of CKD. SCr on admission 0.94. On Kerendia.    Plan  - Trend SCr  - c/w Kerendia (if in formulary)

## 2025-04-14 NOTE — DISCHARGE NOTE NURSING/CASE MANAGEMENT/SOCIAL WORK - PATIENT PORTAL LINK FT
You can access the FollowMyHealth Patient Portal offered by [facility] by registering at the following website: [email or web address]. By joining [facility]’s FollowMyHealth portal, you will also be able to view your health information using other applications (apps) compatible with our system.

## 2025-04-14 NOTE — PROGRESS NOTE ADULT - PROBLEM SELECTOR PLAN 2
Patient w/HTN on home meds Nifedipine 60 ER qd, Imdur 30 qd, Losartan 25 qd, Metoprolol Succinate 25 qd. BP elevated in ED >160 systolic. BP elevated upon admission    Plan  - Restart home BP meds  - Trend BP Patient w/HTN on home meds Nifedipine 60 ER qd, Imdur 30 qd, Losartan 25 qd, Metoprolol Succinate 25 qd. BP elevated in ED >160 systolic. BP elevated upon admission    Plan  - Continue with home BP meds  - Trend BP, recommend outpatient PCP follow-up to adjust regimen

## 2025-04-14 NOTE — PROGRESS NOTE ADULT - PROBLEM SELECTOR PLAN 1
Patient w/mechanical fall 2 weeks ago. Found to have L4 compression fracture on Xray. Did not take oxycodone and tramadol as PCP prescribed. Pain worsened and walking became very difficult. CT L-spine confirmed L4 compression fracture. Pain 5-6/10 when laying flat and very severe when sitting up (says unable to sit up because of this). Pain improved with Oxy 5 in ED    Plan  - f/u Spine recs. Pending CT C and T spine  - Pending TLSO brace  - c/w Pain regimen  Mild: Tylenol 650 q6hrs  Moderate: Oxycodone 2.5 q6hrs prn  Severe: Oxycodone 5 q6hrs prn   - PT eval Patient w/mechanical fall 2 weeks ago. Found to have L4 compression fracture on Xray. Did not take oxycodone and tramadol as PCP prescribed. Pain worsened and walking became very difficult. CT L-spine confirmed L4 compression fracture. Pain 5-6/10 when laying flat and very severe when sitting up (says unable to sit up because of this). Pain improved with Oxy 5 in ED    Plan  - Seen by ortho, C and T spine CT unremarkable   - TLSO brace   - c/w Pain regimen  Mild: Tylenol 650 q6hrs  Moderate: Oxycodone 2.5 q6hrs prn  Severe: Oxycodone 5 q6hrs prn   - PT eval- home PT

## 2025-04-14 NOTE — PROGRESS NOTE ADULT - TIME BILLING
Ulcerative colitis  - continue on current medications  - colonoscopy with iv sedation, will have to hold coumadin 2 days before with stat pt/inr day of   - avoid NSAIDs  - lab work and follow up every 6 months  - call Dr. Suyapa Kinsey about blood pressure
Time spent includes direct patient care  (interview and examination of patient), discussion with other providers, support staff and/or patient's family members, review of medical records, ordering diagnostic tests and analyzing results, and documentation.
Time spent includes direct patient care  (interview and examination of patient), discussion with other providers, support staff and/or patient's family members, review of medical records, ordering diagnostic tests and analyzing results, and documentation.

## 2025-04-14 NOTE — PROGRESS NOTE ADULT - PROBLEM SELECTOR PLAN 3
Patient w/transaminitis. Patient did not endorse overusing Tylenol at home.    Plan  - Pending RUQ US  - Trend CMP Patient w/transaminitis. Patient did not endorse overusing Tylenol at home.    Plan  - RUQ US reviewed and unremarkable   - Trend CMP - LFTs improving   - Recommend outpatient follow-up with PMD within 1 week to trend LFTs

## 2025-04-14 NOTE — DISCHARGE NOTE NURSING/CASE MANAGEMENT/SOCIAL WORK - NSDCFUADDAPPT_GEN_ALL_CORE_FT
APPTS ARE READY TO BE MADE: [ X] YES    Best Family or Patient Contact (if needed):    Additional Information about above appointments (if needed):    1: PCP  2:   3:     Other comments or requests:   Patient advises they do not want our assistance and prefer to coordinate the non - [facility] appointments on their own. No information was provided to the patient.

## 2025-04-14 NOTE — PROGRESS NOTE ADULT - PROBLEM SELECTOR PLAN 5
K at 2.9 in ED. s/p repletion w/KCL 40 and Potassium Phosphate 10.  Improved to 3.5    Plan  - Trend BMP

## 2025-04-14 NOTE — DISCHARGE NOTE NURSING/CASE MANAGEMENT/SOCIAL WORK - FINANCIAL ASSISTANCE
[facility] provides services at a reduced cost to those who are determined to be eligible through [facility]’s financial assistance program. Information regarding [facility]’s financial assistance program can be found by going to [email or web address] or by calling [phone].

## 2025-04-14 NOTE — PROGRESS NOTE ADULT - PROBLEM SELECTOR PLAN 9
PSYCH FOLLOW UP    THIS VISIT WAS COMPLETED IN OFFICE          Patient:  Luci Joaquin  MRN:  [id number]  :  1988  DATE OF SERVICE:  12/10/2024    Patient was identified by name: Luci Joaquin         Medications:  Current Outpatient Medications   Medication Sig Dispense Refill    buPROPion XL (WELLBUTRIN XL) 300 MG 24 hr tablet Take 1 tablet by mouth daily. 30 tablet 0    sertraline (ZOLOFT) 100 MG tablet Take 1 tablet by mouth daily. 90 tablet 0    fluticasone (FLONASE) 50 MCG/ACT nasal spray Spray 1 spray in each nostril in the morning and 1 spray in the evening.      Prenatal Vit-Fe Fumarate-FA (PRENATAL VITAMIN PO) Take 1 tablet by mouth daily.      Probiotic Product (PROBIOTIC DAILY PO) Take 1 tablet by mouth daily.       No current facility-administered medications for this visit.          Allergies:  ALLERGIES:   Allergen Reactions    Sulfa Antibiotics Other (See Comments) and RASH     Unknown         Labs:  Lab Results   Component Value Date    SODIUM 139 09/10/2015    POTASSIUM 5.1 09/10/2015    CHLORIDE 102 09/10/2015    CO2 29 09/10/2015    GLUCOSE 108 (H) 09/10/2015    BUN 12 09/10/2015    CREATININE 0.90 09/10/2015    ALBUMIN 4.2 09/10/2015    BILIRUBIN 0.4 09/10/2015    AST 14 09/10/2015    WBC 15.0 (H) 06/15/2024    RBC 3.17 (L) 06/15/2024    HGB 10.1 (L) 06/15/2024    HCT 31.1 (L) 06/15/2024     06/15/2024                                                                                                                                                Chief Complaint:  Chief Complaint   Patient presents with    Office Visit    Medication Management    Depression    Anxiety    ADHD     \"Wellbutrin XL helps with getting motivated to get things done.\"     ID:  Luci Joaquin is a 36 year old female with past psychiatric history of depression, anxiety, PTSD, and ADHD seen today for follow up and medication management.     Interval history:  A/P last appointment (24)  -Find a  therapist  1. MDD (major depressive disorder), recurrent, in partial remission (CMD)  -Increase buPROPion XL (WELLBUTRIN XL) 300 MG 24 hr tablet; Take 1 tablet by mouth daily.  Dispense: 30 tablet; Refill: 0  - sertraline (ZOLOFT) 100 MG tablet; Take 1 tablet by mouth daily.  Dispense: 90 tablet; Refill: 0  -Continue to walk, exercise as tolerated  2. Attention deficit hyperactivity disorder (ADHD), predominantly inattentive type  -Increase buPROPion XL (WELLBUTRIN XL) 300 MG 24 hr tablet; Take 1 tablet by mouth daily.  Dispense: 30 tablet; Refill: 0  3. RASHAWN (generalized anxiety disorder)  -Continue sertraline (ZOLOFT) 100 MG tablet; Take 1 tablet by mouth daily.  Dispense: 90 tablet; Refill: 0  -Continue to walk, exercise as tolerated  4. PTSD (post-traumatic stress disorder)  -Continue sertraline (ZOLOFT) 100 MG tablet; Take 1 tablet by mouth daily.  Dispense: 90 tablet; Refill: 0   Past psychiatric history/meds:   amphetamine/dextroamphetamine (Adderall), fluoxetine, escitalopram, trazodone     Since last appointment,     Subjective:    Currently, the patient is taking the medications as prescribed and denies side effects.   Client said she likes the  mg Wellbutrin. Feels it helps give her some more energy. She wants to see if the 450 mg dose would help more than current dose. Wants to avoid stimulant use if she can. Did discuss low dose Vyvanse as she talked about issues with eating as well-over eating, eating when not even hungry.     In regards to depression, the patient denies feeling sad, depressed daily or most days.  The pt denies feeling helpless and hopeless.   The pt denies problems with sleep; reports sleeping 6-7 hrs/night.  The pt endorses problems w/ energy/motivation. The pt  denies problems with appetite; denies unintentional weight change.   The pt denies symptoms of anhedonia.  The pt endorses brain fog, problems with focus/concentration-is better but not where she wants it to be.  The pt  denies suicidal ideation, thoughts of self harm, homicidal ideation.      In regards to anxiety, the patient denies excessive worry, difficulty controlling worry.   The patient denies anticipatory worry.   The patient denies ruminating thoughts. The patient endorses feeling nervous regularly.  The patient endorses feeling overwhelmed regularly.  The patient denies catastrophic thinking.  The patient denies irritability.   The patient denies anxiety that is interfering with sleep.   The patient denies panic attacks. Has anxiety with driving for example, but it is manageable.     PTSD:  Abuse/Trauma: dog bite  Flashbacks: with triggers  Nightmares: weird ones of getting bit by raccoon, snake, biting her hand. Not waking up out of sleep but she remembers them.   Reoccuring memories: every time she has a trigger, it is in the back of her mind. Then running through scenarios of how to get out of situations if the come up.   Avoidant behaviors: has to push self to get out and do things.   Hypervigilence: when triggered, seeing dogs  Causing distress yes, but she is trying to manage it.     Therapy-Can't find someone she likes.      Client understand to call 911 or go to closest ER for any SI/HI/ self harm.        Social History     Socioeconomic History    Marital status: Significant other   Tobacco Use    Smoking status: Never    Smokeless tobacco: Never   Vaping Use    Vaping status: Former    Substances: Nicotine   Substance and Sexual Activity    Alcohol use: Not Currently     Comment: 1-2    Drug use: Never     Social Determinants of Health     Financial Resource Strain: Low Risk  (6/14/2024)    Financial Resource Strain     Unable to Get: None   Food Insecurity: Low Risk  (6/14/2024)    Food Insecurity     Worried about Food: Never true     Food is Gone: Never true   Transportation Needs: Not At Risk (6/14/2024)    Transportation Needs     Lack of Reliable Transportation: No   Social Connections: Low Risk   (2024)    Social Connections     Social Connectivity: 5 or more times a week   Interpersonal Safety: Low Risk  (2024)    Interpersonal Safety     How often physically hurt: Never     How often insulted or talked down to: Never     How often threatened with harm: Never     How often scream or curse at: Never            Mental Status Exam:   Appearance: appears stated age, NAD, appropriate clothing, and neat  Behavior/Attitude: Calm, Cooperative, and Adequate Eye Contact  Motor: No PMA/PMR  Speech: Spontaneous and normal rate, rhythm, volume  Language: Appropriate for age, situation  Mood- \"a little better\"  Affect: Euthymic and non-labile  Thought Process: linear, logical, and goal-directed  Associations: Intact  Thought Content: Denies suicidal ideation and no psychosis elicited  Perceptions: no evidence of response to internal stimuli  Insight: age appropriate and good  Judgement: age appropriate, good, and compliant with treatment  General Cognition: Awake, Alert, and Fully Oriented and memory grossly intact      Assessment:  Luci Joaquin is a 36 year old, , female. With past medical history of live birth by  in . Past psych history includes: anxiety, ADHD, depression, PTSD symptoms and medication management for these. Previously treated for ADHD with stimulants, stopped this with pregnancy, planning to optimize use of Wellbutrin XL to 450 mg for better attention/focus, energy while trying to avoid controlled substance use- her choice. Biologically, client has genetics consistent with mental illness. Psychologically, she started Sertraline for mood, feels current dose helps.  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  Safety concerns Absent. Pt denies SI, HI, AVH, and is not perceived to be an imminent threat to self or others     Diagnosis & Plan:  1. MDD (major depressive  disorder), recurrent, in partial remission (CMD)  -Continue buPROPion XL (WELLBUTRIN XL) 300 MG 24 hr tablet; Take 1 tablet by mouth daily.  Dispense: 30 tablet; Refill: 0  -Continue sertraline (ZOLOFT) 100 MG tablet; Take 1 tablet by mouth daily.  Dispense: 90 tablet; Refill: 0  -Add buPROPion XL (WELLBUTRIN XL) 150 MG 24 hr tablet; Take 1 tablet by mouth daily. Take with 1-300 mg for TDD of 450 mg  Dispense: 30 tablet; Refill: 0    2. Attention deficit hyperactivity disorder (ADHD), predominantly inattentive type  -Continue buPROPion XL (WELLBUTRIN XL) 300 MG 24 hr tablet; Take 1 tablet by mouth daily.  Dispense: 30 tablet; Refill: 0  -Continue sertraline (ZOLOFT) 100 MG tablet; Take 1 tablet by mouth daily.  Dispense: 90 tablet; Refill: 0  -Add buPROPion XL (WELLBUTRIN XL) 150 MG 24 hr tablet; Take 1 tablet by mouth daily. Take with 1-300 mg for TDD of 450 mg  Dispense: 30 tablet; Refill: 0    3. RASHAWN (generalized anxiety disorder)  -Continue sertraline (ZOLOFT) 100 MG tablet; Take 1 tablet by mouth daily.  Dispense: 90 tablet; Refill: 0    4. PTSD (post-traumatic stress disorder)  -Continue sertraline (ZOLOFT) 100 MG tablet; Take 1 tablet by mouth daily.  Dispense: 90 tablet; Refill: 0     Follow up  Return in about 4 weeks (around 1/7/2025), or if symptoms worsen or fail to improve, for medication follow up, depression follow up, anxiety follow up, ADHD management.    LOS based on MDM    Discussed the benefits/risks/alternatives of treatment plan. Discussed common and concerning side effects and the patient has demonstrated their understanding of these side effects by engaging in meaningful conversation and asking appropriate questions about them. The patient is competent to make decisions about their healthcare.  Patient provided verbal informed consent for medications.   The patient knows how to contact provider and family members, as well as go to the ED or call 911 in case of an emergency, including thoughts  of self-harm, suicidal/homicidal ideation, or severe worsening of symptoms. Or seek emergent psychiatric evaluation given occurrence of new or worsening sxs, including safety concerns such as danger to self, others, significant medication SE's or inability to self care.     Tiffanie Cheng, MSN, PMHNP-BC    Collaborating MD: Cata Paz  This information is confidential and disclosure without patient consent or statutory authorization is prohibited by law.    Hx of Osteoporosis on Alendronate 70 weekly and Calcitriol 0.25 qd    Plan  - Hold Alendronate  - c/w Calcitriol

## 2025-04-14 NOTE — PROGRESS NOTE ADULT - PROBLEM SELECTOR PLAN 4
Hx of T2DM. On Tradjenta 5 qd, Glipizide ER 10mg qd.     Plan  - A1C in AM  - Low ISS  - Hold home PO meds Hx of T2DM. On Tradjenta 5 qd, Glipizide ER 10mg qd.     Plan  - A1C 6.3  - Low ISS  - Hold home PO meds

## 2025-04-14 NOTE — PROGRESS NOTE ADULT - PROBLEM SELECTOR PLAN 11
Hx of Chronic Leg swelling on Lasix 20 qd.     Plan  - Lasix prn if patient experiences leg swelling

## 2025-04-14 NOTE — PROGRESS NOTE ADULT - PROBLEM SELECTOR PLAN 7
?hx of HLD on Lipitor 20 qd and Vascepa 1g 2 caps BID    Plan  - Lipid Profile in AM  - c/w Lipitor   - c/w Vascepa (if in formulary). ?hx of HLD on Lipitor 20 qd and Vascepa 1g 2 caps BID    Plan  - Lipid Profile  - c/w Lipitor   - c/w Vascepa (if in formulary).

## 2025-04-14 NOTE — PROGRESS NOTE ADULT - SUBJECTIVE AND OBJECTIVE BOX
PROGRESS NOTE    CHENG KIM ([id number])- Patient is a 84y old  Female who presents with a chief complaint of Mechanical Fall w/known L4 Compression Fracture (13 Apr 2025 20:48)      INTERVAL HPI/OVERNIGHT EVENTS:   Patient has no acute events overnight,     SUBJECTIVE: Patient was seen and examined at bedside this morning. Denies any nausea/vomiting/diarrhea, headache, shortness of breath, abdominal pain or chest pain/palpitations. Patient responding appropriately to questions and able to make needs known.     MEDICATIONS:  acetaminophen     Tablet .. 650 milliGRAM(s) Oral every 6 hours PRN  aluminum hydroxide/magnesium hydroxide/simethicone Suspension 30 milliLiter(s) Oral every 4 hours PRN  calcitriol   Capsule 0.25 MICROGram(s) Oral daily  clopidogrel Tablet 75 milliGRAM(s) Oral daily  dextrose 5%. 1000 milliLiter(s) IV Continuous <Continuous>  dextrose 5%. 1000 milliLiter(s) IV Continuous <Continuous>  dextrose 50% Injectable 25 Gram(s) IV Push once  dextrose 50% Injectable 12.5 Gram(s) IV Push once  dextrose 50% Injectable 25 Gram(s) IV Push once  dextrose Oral Gel 15 Gram(s) Oral once PRN  glucagon  Injectable 1 milliGRAM(s) IntraMuscular once  heparin   Injectable 5000 Unit(s) SubCutaneous every 12 hours  insulin lispro (ADMELOG) corrective regimen sliding scale   SubCutaneous three times a day before meals  insulin lispro (ADMELOG) corrective regimen sliding scale   SubCutaneous at bedtime  isosorbide   mononitrate ER Tablet (IMDUR) 30 milliGRAM(s) Oral daily  losartan 25 milliGRAM(s) Oral daily  metoprolol succinate ER 25 milliGRAM(s) Oral daily  NIFEdipine XL 60 milliGRAM(s) Oral daily  oxyCODONE    IR 2.5 milliGRAM(s) Oral every 6 hours PRN  oxyCODONE    IR 5 milliGRAM(s) Oral every 6 hours PRN      PHYSICAL EXAM:  Vital Signs Last 24 Hrs  T(C): 36.8 (14 Apr 2025 05:40), Max: 37.2 (13 Apr 2025 14:45)  T(F): 98.2 (14 Apr 2025 05:40), Max: 98.9 (13 Apr 2025 14:45)  HR: 71 (14 Apr 2025 05:40) (71 - 81)  BP: 180/67 (14 Apr 2025 05:40) (149/72 - 180/67)  BP(mean): --  RR: 17 (14 Apr 2025 05:40) (17 - 18)  SpO2: 99% (14 Apr 2025 05:40) (98% - 99%)    Parameters below as of 14 Apr 2025 05:40  Patient On (Oxygen Delivery Method): room air        GENERAL: NAD, lying in bed comfortably  HEAD:  Atraumatic, Normocephalic  EYES: EOMI, PERRLA. No scleral icterus and no conjunctival injection. Tracks me in room  ENT: Moist mucous membranes  NECK: Supple, No JVD  CHEST/LUNG: Clear to auscultation bilaterally; No rales, rhonchi, wheezing, or rubs. Unlabored respirations  HEART: Regular rate and rhythm; No murmurs, rubs, or gallops  ABDOMEN: BS +; Soft, nontender, nondistended  EXTREMITIES:  2+ Peripheral Pulses, brisk capillary refill. No clubbing, cyanosis, or edema  NERVOUS SYSTEM:  A&Ox3, no focal neurological deficits. CN II-XII grossly intact, but not individually tested.  PSYCHIATRIC: Cooperative. Appropriate mood and affect.  SKIN: No rashes or lesions    LABS:                          13.0   8.63  )-----------( 229      ( 14 Apr 2025 06:18 )             37.3     04-14    141  |  106  |  21  ----------------------------<  163[H]  3.7   |  23  |  0.76    Ca    9.5      14 Apr 2025 06:18  Phos  3.0     04-14  Mg     2.20     04-14    TPro  7.7  /  Alb  3.8  /  TBili  0.5  /  DBili  x   /  AST  96[H]  /  ALT  166[H]  /  AlkPhos  195[H]  04-14          Urinalysis Basic - ( 14 Apr 2025 06:18 )    Color: x / Appearance: x / SG: x / pH: x  Gluc: 163 mg/dL / Ketone: x  / Bili: x / Urobili: x   Blood: x / Protein: x / Nitrite: x   Leuk Esterase: x / RBC: x / WBC x   Sq Epi: x / Non Sq Epi: x / Bacteria: x      PT/INR - ( 12 Apr 2025 16:45 )   PT: 10.8 sec;   INR: 0.91 ratio         PTT - ( 12 Apr 2025 16:45 )  PTT:29.5 sec  Lactate Trend        CAPILLARY BLOOD GLUCOSE      POCT Blood Glucose.: 191 mg/dL (13 Apr 2025 21:53)          New Radiology, EKG, and additional tests have been reviewed.   PROGRESS NOTE    CHENG KIM ([id number])- Patient is a 84y old  Female who presents with a chief complaint of Mechanical Fall w/known L4 Compression Fracture (13 Apr 2025 20:48)      INTERVAL HPI/OVERNIGHT EVENTS:   Patient has no acute events overnight,     SUBJECTIVE: Patient was seen and examined at bedside this morning, daughter assisting with Mandarin translation per patient request. Denies any nausea/vomiting/diarrhea, headache, shortness of breath, abdominal pain or chest pain/palpitations. Patient responding appropriately to questions and able to make needs known.     MEDICATIONS:  acetaminophen     Tablet .. 650 milliGRAM(s) Oral every 6 hours PRN  aluminum hydroxide/magnesium hydroxide/simethicone Suspension 30 milliLiter(s) Oral every 4 hours PRN  calcitriol   Capsule 0.25 MICROGram(s) Oral daily  clopidogrel Tablet 75 milliGRAM(s) Oral daily  dextrose 5%. 1000 milliLiter(s) IV Continuous <Continuous>  dextrose 5%. 1000 milliLiter(s) IV Continuous <Continuous>  dextrose 50% Injectable 25 Gram(s) IV Push once  dextrose 50% Injectable 12.5 Gram(s) IV Push once  dextrose 50% Injectable 25 Gram(s) IV Push once  dextrose Oral Gel 15 Gram(s) Oral once PRN  glucagon  Injectable 1 milliGRAM(s) IntraMuscular once  heparin   Injectable 5000 Unit(s) SubCutaneous every 12 hours  insulin lispro (ADMELOG) corrective regimen sliding scale   SubCutaneous three times a day before meals  insulin lispro (ADMELOG) corrective regimen sliding scale   SubCutaneous at bedtime  isosorbide   mononitrate ER Tablet (IMDUR) 30 milliGRAM(s) Oral daily  losartan 25 milliGRAM(s) Oral daily  metoprolol succinate ER 25 milliGRAM(s) Oral daily  NIFEdipine XL 60 milliGRAM(s) Oral daily  oxyCODONE    IR 2.5 milliGRAM(s) Oral every 6 hours PRN  oxyCODONE    IR 5 milliGRAM(s) Oral every 6 hours PRN      PHYSICAL EXAM:  Vital Signs Last 24 Hrs  T(C): 36.8 (14 Apr 2025 05:40), Max: 37.2 (13 Apr 2025 14:45)  T(F): 98.2 (14 Apr 2025 05:40), Max: 98.9 (13 Apr 2025 14:45)  HR: 71 (14 Apr 2025 05:40) (71 - 81)  BP: 180/67 (14 Apr 2025 05:40) (149/72 - 180/67)  BP(mean): --  RR: 17 (14 Apr 2025 05:40) (17 - 18)  SpO2: 99% (14 Apr 2025 05:40) (98% - 99%)    Parameters below as of 14 Apr 2025 05:40  Patient On (Oxygen Delivery Method): room air        GENERAL: NAD, lying in bed comfortably  HEAD:  Atraumatic, Normocephalic  EYES: EOMI, PERRLA. No scleral icterus and no conjunctival injection  ENT: Moist mucous membranes  NECK: Supple, No JVD  CHEST/LUNG: Clear to auscultation bilaterally; No rales, rhonchi, wheezing, or rubs. Unlabored respirations  HEART: Regular rate and rhythm; No murmurs, rubs, or gallops  ABDOMEN: BS +; Soft, nontender, nondistended  EXTREMITIES:  2+ Peripheral Pulses, brisk capillary refill. No clubbing, cyanosis, or edema  NERVOUS SYSTEM:  A&Ox3, no focal neurological deficits.  PSYCHIATRIC: Cooperative. Appropriate mood and affect.  SKIN: No rashes or lesions    LABS:                          13.0   8.63  )-----------( 229      ( 14 Apr 2025 06:18 )             37.3     04-14    141  |  106  |  21  ----------------------------<  163[H]  3.7   |  23  |  0.76    Ca    9.5      14 Apr 2025 06:18  Phos  3.0     04-14  Mg     2.20     04-14    TPro  7.7  /  Alb  3.8  /  TBili  0.5  /  DBili  x   /  AST  96[H]  /  ALT  166[H]  /  AlkPhos  195[H]  04-14          Urinalysis Basic - ( 14 Apr 2025 06:18 )    Color: x / Appearance: x / SG: x / pH: x  Gluc: 163 mg/dL / Ketone: x  / Bili: x / Urobili: x   Blood: x / Protein: x / Nitrite: x   Leuk Esterase: x / RBC: x / WBC x   Sq Epi: x / Non Sq Epi: x / Bacteria: x      PT/INR - ( 12 Apr 2025 16:45 )   PT: 10.8 sec;   INR: 0.91 ratio         PTT - ( 12 Apr 2025 16:45 )  PTT:29.5 sec  Lactate Trend        CAPILLARY BLOOD GLUCOSE      POCT Blood Glucose.: 191 mg/dL (13 Apr 2025 21:53)          New Radiology, EKG, and additional tests have been reviewed.

## 2025-04-14 NOTE — PROGRESS NOTE ADULT - ASSESSMENT
84-year-old female, history hypertension, diabetes, recent UTIs, on Lasix for swelling, CVA with residual L-sided deficit who presented to the ED with worsening back pain. Fell 2 weeks ago, pain worsened, gait impaired, went to PCP. Found to have L4 compression fracture on XR. Pain worsened over past few days and patient was unable to walk. In ED, CT L Spine confirmed L4 compression fracture. Here for pain management and TLSO brace. Pending PT eval. Pending CT C and T spine. Pending RUQ US for transaminitis.

## 2025-04-14 NOTE — PROGRESS NOTE ADULT - PROBLEM SELECTOR PLAN 6
Hx of CVA 15 years ago w/residual L sided deficits. On Plavix    Plan  - c/w Plavix Hx of CVA 15 years ago w/ residual L sided deficits. On Plavix    Plan  - c/w Plavix

## 2025-04-14 NOTE — PROGRESS NOTE ADULT - ATTENDING COMMENTS
Seen and examined, care d/w HS    84F with hx of HTN, DM2, recent UTIs, on Lasix for swelling, CVA with residual L-sided deficit who presented to the ED with worsening back pain dx with L4 compression fracture s/p ortho eval. CT C and T spine done and largely unremarkable. TLSO brace provided prior to discharge. PT eval recs home PT.  Acute transaminitis, no pain, CK wnl, GGT elevated suggestive liver origin, RUQ US wnl. LFTs downtrending. Recommend outpatient follow-up with PCP to trend LFTs within 1-2 weeks.     Patient seen and examined, daughter at bedside providing Mandarin translation per patient request. Patient has no complaints at this time, denies leg or back pain at this time. Discussed recommendation for outpatient PCP follow-up within 1-2 weeks for lab work for LFTs. Daughter agreeable with plan.     Patient stable for discharge home today with home PT and PMD follow-up    Rest of plan as above
Seen and examined, care d/w HS    84 HTN, DM2, recent UTIs, on Lasix for swelling, CVA with residual L-sided deficit who presented to the ED with worsening back pain dx with L4 compression fracture s/p ortho eval, CT C and T spine done alert ortho re: completion, pending TLSO brace, PT eval.  Acute transaminitis, no pain, CK wnl, GGT elevated suggestive liver origin, RUQ US wnl, continue to trend LFTs, already downtrending.    PT eval, dc planning

## 2025-04-14 NOTE — PROGRESS NOTE ADULT - PROBLEM SELECTOR PLAN 12
DVT: Heparin subq  Diet: DASH/TLC  Dispo: Pending clinical course and PT eval DVT: Heparin subq  Diet: DASH/TLC  Dispo: Home PT

## 2025-05-03 ENCOUNTER — NON-APPOINTMENT (OUTPATIENT)
Age: 85
End: 2025-05-03

## 2025-05-05 ENCOUNTER — NON-APPOINTMENT (OUTPATIENT)
Age: 85
End: 2025-05-05

## 2025-05-05 ENCOUNTER — APPOINTMENT (OUTPATIENT)
Dept: ORTHOPEDIC SURGERY | Facility: CLINIC | Age: 85
End: 2025-05-05
Payer: MEDICARE

## 2025-05-05 VITALS — HEIGHT: 62 IN | WEIGHT: 116 LBS | BODY MASS INDEX: 21.35 KG/M2

## 2025-05-05 DIAGNOSIS — S32.009A UNSPECIFIED FRACTURE OF UNSPECIFIED LUMBAR VERTEBRA, INITIAL ENCOUNTER FOR CLOSED FRACTURE: ICD-10-CM

## 2025-05-05 DIAGNOSIS — S32.040A WEDGE COMPRESSION FRACTURE OF FOURTH LUMBAR VERTEBRA, INITIAL ENCOUNTER FOR CLOSED FRACTURE: ICD-10-CM

## 2025-05-05 PROCEDURE — 99203 OFFICE O/P NEW LOW 30 MIN: CPT

## 2025-05-14 ENCOUNTER — APPOINTMENT (OUTPATIENT)
Dept: NEUROSURGERY | Facility: CLINIC | Age: 85
End: 2025-05-14

## 2025-05-27 ENCOUNTER — NON-APPOINTMENT (OUTPATIENT)
Age: 85
End: 2025-05-27

## 2025-05-27 ENCOUNTER — APPOINTMENT (OUTPATIENT)
Dept: INTERVENTIONAL RADIOLOGY/VASCULAR | Facility: CLINIC | Age: 85
End: 2025-05-27
Payer: MEDICARE

## 2025-05-27 VITALS
WEIGHT: 118 LBS | OXYGEN SATURATION: 99 % | RESPIRATION RATE: 17 BRPM | HEART RATE: 77 BPM | SYSTOLIC BLOOD PRESSURE: 153 MMHG | BODY MASS INDEX: 21.71 KG/M2 | DIASTOLIC BLOOD PRESSURE: 76 MMHG | HEIGHT: 62 IN

## 2025-05-27 DIAGNOSIS — Z86.73 PERSONAL HISTORY OF TRANSIENT ISCHEMIC ATTACK (TIA), AND CEREBRAL INFARCTION W/OUT RESIDUAL DEFICITS: ICD-10-CM

## 2025-05-27 DIAGNOSIS — S32.009A UNSPECIFIED FRACTURE OF UNSPECIFIED LUMBAR VERTEBRA, INITIAL ENCOUNTER FOR CLOSED FRACTURE: ICD-10-CM

## 2025-05-27 DIAGNOSIS — Z86.39 PERSONAL HISTORY OF OTHER ENDOCRINE, NUTRITIONAL AND METABOLIC DISEASE: ICD-10-CM

## 2025-05-27 PROCEDURE — 99204 OFFICE O/P NEW MOD 45 MIN: CPT

## 2025-05-27 RX ORDER — FUROSEMIDE 20 MG/1
20 TABLET ORAL
Refills: 0 | Status: ACTIVE | COMMUNITY

## 2025-05-27 RX ORDER — LINACLOTIDE 145 UG/1
145 CAPSULE, GELATIN COATED ORAL
Refills: 0 | Status: ACTIVE | COMMUNITY

## 2025-05-27 RX ORDER — DENOSUMAB 60 MG/ML
60 INJECTION SUBCUTANEOUS
Refills: 0 | Status: ACTIVE | COMMUNITY

## 2025-05-27 RX ORDER — FINERENONE 10 MG/1
10 TABLET, FILM COATED ORAL
Refills: 0 | Status: ACTIVE | COMMUNITY

## 2025-05-30 ENCOUNTER — APPOINTMENT (OUTPATIENT)
Dept: MRI IMAGING | Facility: CLINIC | Age: 85
End: 2025-05-30
Payer: MEDICARE

## 2025-05-30 ENCOUNTER — OUTPATIENT (OUTPATIENT)
Dept: OUTPATIENT SERVICES | Facility: HOSPITAL | Age: 85
LOS: 1 days | End: 2025-05-30
Payer: MEDICARE

## 2025-05-30 DIAGNOSIS — S32.009A UNSPECIFIED FRACTURE OF UNSPECIFIED LUMBAR VERTEBRA, INITIAL ENCOUNTER FOR CLOSED FRACTURE: ICD-10-CM

## 2025-05-30 PROCEDURE — 72148 MRI LUMBAR SPINE W/O DYE: CPT

## 2025-05-30 PROCEDURE — 72146 MRI CHEST SPINE W/O DYE: CPT | Mod: 26

## 2025-05-30 PROCEDURE — 72148 MRI LUMBAR SPINE W/O DYE: CPT | Mod: 26

## 2025-05-30 PROCEDURE — 72146 MRI CHEST SPINE W/O DYE: CPT

## 2025-06-06 ENCOUNTER — OUTPATIENT (OUTPATIENT)
Dept: OUTPATIENT SERVICES | Facility: HOSPITAL | Age: 85
LOS: 1 days | End: 2025-06-06
Payer: MEDICARE

## 2025-06-06 VITALS
DIASTOLIC BLOOD PRESSURE: 70 MMHG | HEART RATE: 73 BPM | WEIGHT: 110.01 LBS | HEIGHT: 60 IN | SYSTOLIC BLOOD PRESSURE: 150 MMHG | TEMPERATURE: 98 F | RESPIRATION RATE: 16 BRPM | OXYGEN SATURATION: 99 %

## 2025-06-06 DIAGNOSIS — I63.9 CEREBRAL INFARCTION, UNSPECIFIED: ICD-10-CM

## 2025-06-06 DIAGNOSIS — S32.009A UNSPECIFIED FRACTURE OF UNSPECIFIED LUMBAR VERTEBRA, INITIAL ENCOUNTER FOR CLOSED FRACTURE: ICD-10-CM

## 2025-06-06 DIAGNOSIS — S12.9XXA FRACTURE OF NECK, UNSPECIFIED, INITIAL ENCOUNTER: ICD-10-CM

## 2025-06-06 DIAGNOSIS — E11.9 TYPE 2 DIABETES MELLITUS WITHOUT COMPLICATIONS: ICD-10-CM

## 2025-06-06 DIAGNOSIS — I10 ESSENTIAL (PRIMARY) HYPERTENSION: ICD-10-CM

## 2025-06-06 DIAGNOSIS — H52.532: ICD-10-CM

## 2025-06-06 LAB
ANION GAP SERPL CALC-SCNC: 10 MMOL/L — SIGNIFICANT CHANGE UP (ref 7–14)
BUN SERPL-MCNC: 32 MG/DL — HIGH (ref 7–23)
CALCIUM SERPL-MCNC: 9.5 MG/DL — SIGNIFICANT CHANGE UP (ref 8.4–10.5)
CHLORIDE SERPL-SCNC: 101 MMOL/L — SIGNIFICANT CHANGE UP (ref 98–107)
CO2 SERPL-SCNC: 27 MMOL/L — SIGNIFICANT CHANGE UP (ref 22–31)
CREAT SERPL-MCNC: 0.99 MG/DL — SIGNIFICANT CHANGE UP (ref 0.5–1.3)
EGFR: 57 ML/MIN/1.73M2 — LOW
EGFR: 57 ML/MIN/1.73M2 — LOW
GLUCOSE SERPL-MCNC: 147 MG/DL — HIGH (ref 70–99)
HCT VFR BLD CALC: 33.9 % — LOW (ref 34.5–45)
HGB BLD-MCNC: 11.4 G/DL — LOW (ref 11.5–15.5)
MCHC RBC-ENTMCNC: 31.4 PG — SIGNIFICANT CHANGE UP (ref 27–34)
MCHC RBC-ENTMCNC: 33.6 G/DL — SIGNIFICANT CHANGE UP (ref 32–36)
MCV RBC AUTO: 93.4 FL — SIGNIFICANT CHANGE UP (ref 80–100)
NRBC # BLD AUTO: 0 K/UL — SIGNIFICANT CHANGE UP (ref 0–0)
NRBC # FLD: 0 K/UL — SIGNIFICANT CHANGE UP (ref 0–0)
NRBC BLD AUTO-RTO: 0 /100 WBCS — SIGNIFICANT CHANGE UP (ref 0–0)
PLATELET # BLD AUTO: 258 K/UL — SIGNIFICANT CHANGE UP (ref 150–400)
POTASSIUM SERPL-MCNC: 4.1 MMOL/L — SIGNIFICANT CHANGE UP (ref 3.5–5.3)
POTASSIUM SERPL-SCNC: 4.1 MMOL/L — SIGNIFICANT CHANGE UP (ref 3.5–5.3)
RBC # BLD: 3.63 M/UL — LOW (ref 3.8–5.2)
RBC # FLD: 14.8 % — HIGH (ref 10.3–14.5)
SODIUM SERPL-SCNC: 138 MMOL/L — SIGNIFICANT CHANGE UP (ref 135–145)
WBC # BLD: 7.77 K/UL — SIGNIFICANT CHANGE UP (ref 3.8–10.5)
WBC # FLD AUTO: 7.77 K/UL — SIGNIFICANT CHANGE UP (ref 3.8–10.5)

## 2025-06-06 NOTE — H&P PST ADULT - LAST CARDIAC ANGIOGRAM/IMAGING
denies Right 3rd fingertip: 1 cm linear laceration vertical center of fingertip, cap refill <2 sec, sensation intact

## 2025-06-06 NOTE — H&P PST ADULT - MUSCULOSKELETAL
details… no joint swelling/no joint erythema/no joint warmth/no calf tenderness/back exam/abnormal gait

## 2025-06-06 NOTE — H&P PST ADULT - PROBLEM SELECTOR PLAN 1
Patient tentatively scheduled for L4 Vertebral body augmentation on 6/11/25.  Pre-op instructions provided. Pt given verbal and written instructions with teach back on chlorhexidine shampoo and pepcid. Pt verbalized understanding with return demonstration.     CBC, BMP were done at PST

## 2025-06-06 NOTE — H&P PST ADULT - NSICDXPASTMEDICALHX_GEN_ALL_CORE_FT
PAST MEDICAL HISTORY:  Chronic constipation     CVA (cerebral vascular accident)     Hypertension     Lumbar vertebral fracture     Type 2 diabetes mellitus

## 2025-06-06 NOTE — H&P PST ADULT - PRO ARRIVE FROM
Presenting for progressive weakness and weight gain, concerning for HF exacerbation. PMH: Endocarditis s/p bioprosthetic AVR, ICM w/ HFrEF 45%, pHTN, VT s/p ICD, CKD4, Afib    Code status: Full     Team: eKvin 1  Running: Lasix @ 5 ml/hr (Started today)    Neuro: ao*4  Cardiac/Tele:  AV paced, occasional VT and four runs of VT (2 - 15 seconds) and team aware (pt asymptomatic), weak radial and foot pulses (+1), LE edema +2  Respiratory: RA  GI/: distended abdomen, urinating via bedside urinal, LBM 1/14 formed   Diet/Appetite: Combination Diet No Caffeine Diet, Low Saturated Fat Na <2400mg Diet    Endocrine: ACHS  LDAs: L PIV infusing   Activity: up ad jorgito  Pain: denies     Plan: continue to diuresis and monitor kidney functions, Bone marrow biopsy 1/18,EP ablation scheduled for 1/19 (possible outpatient)    Chelsea Dunlap, RN  Time cared for 0700 - 1930   home

## 2025-06-06 NOTE — H&P PST ADULT - HISTORY OF PRESENT ILLNESS
84 year old female with pmhx of HTN, DMII, HLD, CVA (about 15 years ago. with left sided weakness), presents for pre-op evaluation for diagnosis of Closed vertebral fracture. Pt s/p fall in March 30, 2025. Pt started complaining of severe low back pain 2 days later. Daughter brought pt to [facility] ER found to have  L4 compression fracture with approximately 40% height loss and minimal osseous retropulsion without significant central canal stenosis on CT. Pt c/o severe back pain with movement, walking, standing, lying down. She is able to walk slowly with a walker and with some assistance. Pt c/o numbness on her left leg after prolonged sitting. Denies incontinence, fevers, cp, sob, cordoba, lightheadedness, palpitations.  84 year old female with pmhx of HTN, DMII, HLD, CVA (about 15 years ago. with left sided weakness), presents for pre-op evaluation for diagnosis of Closed vertebral fracture. Pt s/p fall in March 30, 2025. Pt started complaining of severe low back pain 2 days later. Daughter brought pt to [facility] ER 4/2025 found to have  L4 compression fracture with approximately 40% height loss and minimal osseous retropulsion without significant central canal stenosis on CT. Pt c/o severe back pain with movement, walking, standing, lying down. She is able to walk slowly with a walker and with some assistance. Pt c/o numbness on her left leg after prolonged sitting. Denies incontinence, fevers, cp, sob, cordoba, lightheadedness, palpitations.

## 2025-06-06 NOTE — H&P PST ADULT - ENDOCRINE COMMENTS
HgbA1c 6.3- April 2025-in [place] HgbA1c 6.3- April 2025-in [place]. [facility]

## 2025-06-06 NOTE — H&P PST ADULT - NEGATIVE ENMT SYMPTOMS
upper and lower dentures./no hearing difficulty/no ear pain/no tinnitus/no vertigo/no sinus symptoms/no nasal congestion/no nasal discharge/no gum bleeding/no dry mouth/no throat pain/no dysphagia

## 2025-06-10 PROBLEM — S32.009A UNSPECIFIED FRACTURE OF UNSPECIFIED LUMBAR VERTEBRA, INITIAL ENCOUNTER FOR CLOSED FRACTURE: Chronic | Status: ACTIVE | Noted: 2025-06-06

## 2025-06-10 PROBLEM — K59.09 OTHER CONSTIPATION: Chronic | Status: ACTIVE | Noted: 2025-06-06

## 2025-06-11 ENCOUNTER — OUTPATIENT (OUTPATIENT)
Dept: OUTPATIENT SERVICES | Facility: HOSPITAL | Age: 85
LOS: 1 days | End: 2025-06-11
Payer: MEDICARE

## 2025-06-11 ENCOUNTER — RESULT REVIEW (OUTPATIENT)
Age: 85
End: 2025-06-11

## 2025-06-11 ENCOUNTER — TRANSCRIPTION ENCOUNTER (OUTPATIENT)
Age: 85
End: 2025-06-11

## 2025-06-11 VITALS
RESPIRATION RATE: 18 BRPM | HEART RATE: 78 BPM | SYSTOLIC BLOOD PRESSURE: 160 MMHG | DIASTOLIC BLOOD PRESSURE: 76 MMHG | TEMPERATURE: 97 F | OXYGEN SATURATION: 100 %

## 2025-06-11 VITALS
SYSTOLIC BLOOD PRESSURE: 158 MMHG | HEART RATE: 70 BPM | OXYGEN SATURATION: 100 % | DIASTOLIC BLOOD PRESSURE: 72 MMHG | RESPIRATION RATE: 16 BRPM

## 2025-06-11 DIAGNOSIS — M54.50 LOW BACK PAIN, UNSPECIFIED: ICD-10-CM

## 2025-06-11 DIAGNOSIS — S32.009A UNSPECIFIED FRACTURE OF UNSPECIFIED LUMBAR VERTEBRA, INITIAL ENCOUNTER FOR CLOSED FRACTURE: ICD-10-CM

## 2025-06-11 LAB — GLUCOSE BLDC GLUCOMTR-MCNC: 153 MG/DL — HIGH (ref 70–99)

## 2025-06-11 PROCEDURE — 88342 IMHCHEM/IMCYTCHM 1ST ANTB: CPT | Mod: 26

## 2025-06-11 PROCEDURE — 22514 PERQ VERTEBRAL AUGMENTATION: CPT

## 2025-06-11 PROCEDURE — 88311 DECALCIFY TISSUE: CPT | Mod: 26

## 2025-06-11 PROCEDURE — 88341 IMHCHEM/IMCYTCHM EA ADD ANTB: CPT | Mod: 26

## 2025-06-11 PROCEDURE — 88304 TISSUE EXAM BY PATHOLOGIST: CPT | Mod: 26

## 2025-06-11 RX ORDER — ONDANSETRON HCL/PF 4 MG/2 ML
4 VIAL (ML) INJECTION ONCE
Refills: 0 | Status: DISCONTINUED | OUTPATIENT
Start: 2025-06-11 | End: 2025-06-25

## 2025-06-11 RX ORDER — FENTANYL CITRATE-0.9 % NACL/PF 100MCG/2ML
50 SYRINGE (ML) INTRAVENOUS
Refills: 0 | Status: DISCONTINUED | OUTPATIENT
Start: 2025-06-11 | End: 2025-06-11

## 2025-06-11 NOTE — PRE PROCEDURE NOTE - PRE PROCEDURE EVALUATION
Interventional Radiology    84 year old female with pmhx of HTN, DMII, HLD, CVA (about 15 years ago. with left sided weakness), presents for pre-op evaluation for diagnosis of Closed vertebral fracture. Pt s/p fall in March 30, 2025. Pt started complaining of severe low back pain 2 days later. Daughter brought pt to [facility] ER 4/2025 found to have  L4 compression fracture with approximately 40% height loss and minimal osseous retropulsion without significant central canal stenosis on CT. Pt c/o severe back pain with movement, walking, standing, lying down. She is able to walk slowly with a walker and with some assistance. Pt c/o numbness on her left leg after prolonged sitting. Patient presents for L4 vertebral augmentation    Allergies: Allergy Status Unknown    Medications (Abx/Cardiac/Anticoagulation/Blood Products)    Exam  General: No acute distress  Chest: Non labored breathing  Abdomen: Non-distended  Extremities: No swelling, warm    -WBC 7.77 / HgB 11.4 / Hct 33.9 / Plt 258  -Na 138 / Cl 101 / BUN 32 / Glucose 147  -K 4.1 / CO2 27 / Cr 0.99  -ALT -- / Alk Phos -- / T.Bili --    Plan:     -- Relevant imaging and labs were reviewed.   -- Risks, benefits, and alternatives were explained to the patient and informed consent was obtained.

## 2025-06-11 NOTE — ASU DISCHARGE PLAN (ADULT/PEDIATRIC) - FINANCIAL ASSISTANCE
[facility] provides services at a reduced cost to those who are determined to be eligible through [facility]’s financial assistance program. Information regarding [facility]’s financial assistance program can be found by going to [email or web address] or by calling [phone].

## 2025-06-11 NOTE — ASU DISCHARGE PLAN (ADULT/PEDIATRIC) - COMMENTS
Patient scheduled for outpatient follow-up appointment with Dr. Reardon on 7/7/25 11am. Outpatient IR office [phone]

## 2025-06-11 NOTE — PROCEDURE NOTE - PROCEDURE FINDINGS AND DETAILS
Successful bi-pedicular L4 Vertebral body augmentation w/Spine Walter and 4.5cc of cement  -asa and plavix ok to resume 48 hrs

## 2025-06-11 NOTE — ASU DISCHARGE PLAN (ADULT/PEDIATRIC) - ASU DC SPECIAL INSTRUCTIONSFT
Discharge Instructions  - You have had a kyphoplasty of L4 vertebral body.  - You may shower in 24 hours. No soaking or swimming until the site is completely healed.  - Keep the area covered and dry for the next 24 hours.  - Do not perform any heavy lifting for the next 6-12 weeks.   - Limit the amount of heavy lifting that you do in the future, and if you have to lift anything, then lift with your legs.  - Try to take some light walks over the next few days.  - You may resume your normal diet.  - You may resume your normal medications however you should wait 48 hours before restarting aspirin, plavix, or blood thinners.  - It is normal to experience some pain over the site for the next few days. You may take apply ice to the area (20 minutes on, 20 minutes off) and take Tylenol for that pain. Do not take more frequently than every 6 hours and do not exceed more than 3000mg of Tylenol in a 24 hour period.    - You were given conscious sedation which may make you drowsy, therefore you need someone to stay with you until the morning following the procedure.  - Do not drive, engage in heavy lifting or strenuous activity, or drink any alcoholic beverages for the next 24 hours.   - You may resume normal activity in 24 hours.    Notify your primary physician and/or Interventional Radiology IMMEDIATELY if you experience any of the following       - Fever of 101F or 38C       - Chills or Rigors/ Shakes       - Swelling and/or Redness in the area around the biopsy site       - Worsening Pain       - Blood soaked bandages or worsening bleeding       - Lightheadedness and/or dizziness upon standing       - Chest Pain/ Tightness       - Shortness of Breath       - Difficulty walking    If you have a problem that you believe requires IMMEDIATE attention, please go to your NEAREST Emergency Room. If you believe your problem can safely wait until you speak to a physician, please call Interventional Radiology for any concerns.    Please feel free to contact us at [phone] if any problems arise. After 6PM, Monday through Friday, on weekends and on holidays, please call [phone] and ask for the interventional radiology resident on call.

## 2025-06-18 DIAGNOSIS — M80.88XA OTHER OSTEOPOROSIS WITH CURRENT PATHOLOGICAL FRACTURE, VERTEBRA(E), INITIAL ENCOUNTER FOR FRACTURE: ICD-10-CM

## 2025-06-19 ENCOUNTER — INPATIENT (INPATIENT)
Facility: HOSPITAL | Age: 85
LOS: 11 days | Discharge: NURSING, REHAB OR LONG-TERM CARE | End: 2025-07-01
Attending: STUDENT IN AN ORGANIZED HEALTH CARE EDUCATION/TRAINING PROGRAM | Admitting: STUDENT IN AN ORGANIZED HEALTH CARE EDUCATION/TRAINING PROGRAM
Payer: MEDICARE

## 2025-06-19 VITALS
WEIGHT: 110.01 LBS | SYSTOLIC BLOOD PRESSURE: 163 MMHG | RESPIRATION RATE: 18 BRPM | HEIGHT: 60 IN | DIASTOLIC BLOOD PRESSURE: 75 MMHG | HEART RATE: 85 BPM | TEMPERATURE: 98 F | OXYGEN SATURATION: 98 %

## 2025-06-19 LAB
ALBUMIN SERPL ELPH-MCNC: 3.5 G/DL — SIGNIFICANT CHANGE UP (ref 3.3–5)
ALP SERPL-CCNC: 97 U/L — SIGNIFICANT CHANGE UP (ref 40–120)
ALT FLD-CCNC: 12 U/L — SIGNIFICANT CHANGE UP (ref 4–33)
ANION GAP SERPL CALC-SCNC: 12 MMOL/L — SIGNIFICANT CHANGE UP (ref 7–14)
APTT BLD: 25.2 SEC — LOW (ref 26.1–36.8)
AST SERPL-CCNC: 21 U/L — SIGNIFICANT CHANGE UP (ref 4–32)
BASOPHILS # BLD AUTO: 0.03 K/UL — SIGNIFICANT CHANGE UP (ref 0–0.2)
BASOPHILS NFR BLD AUTO: 0.3 % — SIGNIFICANT CHANGE UP (ref 0–2)
BILIRUB SERPL-MCNC: 0.4 MG/DL — SIGNIFICANT CHANGE UP (ref 0.2–1.2)
BLD GP AB SCN SERPL QL: NEGATIVE — SIGNIFICANT CHANGE UP
BUN SERPL-MCNC: 29 MG/DL — HIGH (ref 7–23)
CALCIUM SERPL-MCNC: 9.7 MG/DL — SIGNIFICANT CHANGE UP (ref 8.4–10.5)
CHLORIDE SERPL-SCNC: 104 MMOL/L — SIGNIFICANT CHANGE UP (ref 98–107)
CO2 SERPL-SCNC: 24 MMOL/L — SIGNIFICANT CHANGE UP (ref 22–31)
CREAT SERPL-MCNC: 0.87 MG/DL — SIGNIFICANT CHANGE UP (ref 0.5–1.3)
EGFR: 66 ML/MIN/1.73M2 — SIGNIFICANT CHANGE UP
EGFR: 66 ML/MIN/1.73M2 — SIGNIFICANT CHANGE UP
EOSINOPHIL # BLD AUTO: 0.04 K/UL — SIGNIFICANT CHANGE UP (ref 0–0.5)
EOSINOPHIL NFR BLD AUTO: 0.4 % — SIGNIFICANT CHANGE UP (ref 0–6)
GLUCOSE SERPL-MCNC: 121 MG/DL — HIGH (ref 70–99)
HCT VFR BLD CALC: 30.5 % — LOW (ref 34.5–45)
HGB BLD-MCNC: 10.3 G/DL — LOW (ref 11.5–15.5)
IMM GRANULOCYTES # BLD AUTO: 0.05 K/UL — SIGNIFICANT CHANGE UP (ref 0–0.07)
IMM GRANULOCYTES NFR BLD AUTO: 0.6 % — SIGNIFICANT CHANGE UP (ref 0–0.9)
INR BLD: <0.9 RATIO — LOW (ref 0.85–1.16)
LYMPHOCYTES # BLD AUTO: 1.58 K/UL — SIGNIFICANT CHANGE UP (ref 1–3.3)
LYMPHOCYTES NFR BLD AUTO: 17.6 % — SIGNIFICANT CHANGE UP (ref 13–44)
MCHC RBC-ENTMCNC: 31 PG — SIGNIFICANT CHANGE UP (ref 27–34)
MCHC RBC-ENTMCNC: 33.8 G/DL — SIGNIFICANT CHANGE UP (ref 32–36)
MCV RBC AUTO: 91.9 FL — SIGNIFICANT CHANGE UP (ref 80–100)
MONOCYTES # BLD AUTO: 0.95 K/UL — HIGH (ref 0–0.9)
MONOCYTES NFR BLD AUTO: 10.6 % — SIGNIFICANT CHANGE UP (ref 2–14)
NEUTROPHILS # BLD AUTO: 6.33 K/UL — SIGNIFICANT CHANGE UP (ref 1.8–7.4)
NEUTROPHILS NFR BLD AUTO: 70.5 % — SIGNIFICANT CHANGE UP (ref 43–77)
NRBC # BLD AUTO: 0 K/UL — SIGNIFICANT CHANGE UP (ref 0–0)
NRBC # FLD: 0 K/UL — SIGNIFICANT CHANGE UP (ref 0–0)
NRBC BLD AUTO-RTO: 0 /100 WBCS — SIGNIFICANT CHANGE UP (ref 0–0)
PLATELET # BLD AUTO: 266 K/UL — SIGNIFICANT CHANGE UP (ref 150–400)
PMV BLD: 10.1 FL — SIGNIFICANT CHANGE UP (ref 7–13)
POTASSIUM SERPL-MCNC: 3.6 MMOL/L — SIGNIFICANT CHANGE UP (ref 3.5–5.3)
POTASSIUM SERPL-SCNC: 3.6 MMOL/L — SIGNIFICANT CHANGE UP (ref 3.5–5.3)
PROT SERPL-MCNC: 7.4 G/DL — SIGNIFICANT CHANGE UP (ref 6–8.3)
PROTHROM AB SERPL-ACNC: 9.8 SEC — LOW (ref 9.9–13.4)
RBC # BLD: 3.32 M/UL — LOW (ref 3.8–5.2)
RBC # FLD: 14.7 % — HIGH (ref 10.3–14.5)
RH IG SCN BLD-IMP: POSITIVE — SIGNIFICANT CHANGE UP
SODIUM SERPL-SCNC: 140 MMOL/L — SIGNIFICANT CHANGE UP (ref 135–145)
SURGICAL PATHOLOGY STUDY: SIGNIFICANT CHANGE UP
WBC # BLD: 8.98 K/UL — SIGNIFICANT CHANGE UP (ref 3.8–10.5)
WBC # FLD AUTO: 8.98 K/UL — SIGNIFICANT CHANGE UP (ref 3.8–10.5)

## 2025-06-19 PROCEDURE — 73562 X-RAY EXAM OF KNEE 3: CPT | Mod: 26,LT

## 2025-06-19 PROCEDURE — 73610 X-RAY EXAM OF ANKLE: CPT | Mod: 26,LT

## 2025-06-19 PROCEDURE — 73590 X-RAY EXAM OF LOWER LEG: CPT | Mod: 26,LT,77

## 2025-06-19 PROCEDURE — 73610 X-RAY EXAM OF ANKLE: CPT | Mod: 26,LT,77

## 2025-06-19 PROCEDURE — 72170 X-RAY EXAM OF PELVIS: CPT | Mod: 26

## 2025-06-19 PROCEDURE — 73590 X-RAY EXAM OF LOWER LEG: CPT | Mod: 26,LT

## 2025-06-19 PROCEDURE — 71045 X-RAY EXAM CHEST 1 VIEW: CPT | Mod: 26

## 2025-06-19 PROCEDURE — 73630 X-RAY EXAM OF FOOT: CPT | Mod: 26,LT

## 2025-06-19 PROCEDURE — 99285 EMERGENCY DEPT VISIT HI MDM: CPT

## 2025-06-19 RX ORDER — LIDOCAINE HCL/PF 20 MG/ML
10 AMPUL, LUER TIP INJECTION ONCE
Refills: 0 | Status: COMPLETED | OUTPATIENT
Start: 2025-06-19 | End: 2025-06-19

## 2025-06-19 RX ORDER — ACETAMINOPHEN 500 MG/5ML
750 LIQUID (ML) ORAL ONCE
Refills: 0 | Status: COMPLETED | OUTPATIENT
Start: 2025-06-19 | End: 2025-06-19

## 2025-06-19 RX ADMIN — Medication 4 MILLIGRAM(S): at 19:30

## 2025-06-19 RX ADMIN — Medication 300 MILLIGRAM(S): at 19:29

## 2025-06-19 RX ADMIN — Medication 4 MILLIGRAM(S): at 21:58

## 2025-06-19 RX ADMIN — Medication 10 MILLILITER(S): at 23:20

## 2025-06-19 NOTE — ED PROVIDER NOTE - PROGRESS NOTE DETAILS
Kyle BARFIELD, PGY-2;  Received signout this patient from previous provider, 87-year-old female past medical history of CAD status post stent, CHF, type 2 diabetes mellitus, hypothyroidism, hyperlipidemia, anemia, and dementia AOx 0 at baseline presented with diarrhea and weakness.  Patient not been able to tolerate p.o.  Review of labs indicate hyponatremia, elevated anion gap, elevated lactate 3.1 plan to repeat.  Patient pending CT abdomen pelvis with IV contrast and CT head.  Patient will require admission. Kyle BARFIELD, PGY-2;  Orthopedics consulted for displaced fracture of the left lateral and medial malleoli with dislocation

## 2025-06-19 NOTE — CONSULT NOTE ADULT - SUBJECTIVE AND OBJECTIVE BOX
HPI  84yFemale w/ c/o L ankle pain s/p mechanical fall today. Unable to bear weight in the LLE since the fall. Denies headstrike or LOC. Denies numbness/tingling in the LLE. Denies any other trauma/injuries at this time. At baseline, community ambulator with walker    ROS  Negative unless otherwise specified in HPI.    PAST MEDICAL & SURGICAL Hx  PAST MEDICAL & SURGICAL HISTORY:  Hypertension      Type 2 diabetes mellitus      CVA (cerebral vascular accident)      Lumbar vertebral fracture      Chronic constipation      History of cholecystectomy          MEDICATIONS  Home Medications:  alendronate 70 mg oral tablet: 1 tab(s) orally once a week (11 Jun 2025 10:26)  calcitriol 0.25 mcg oral capsule: 1 cap(s) orally once a day (11 Jun 2025 10:26)  clopidogrel 75 mg oral tablet: 1 tab(s) orally once a day (11 Jun 2025 10:42)  glipiZIDE 10 mg oral tablet, extended release: 1 tab(s) orally once a day (11 Jun 2025 10:26)  Linzess 145 mcg oral capsule: 1 cap(s) orally once a day as needed for  constipation (11 Jun 2025 10:26)  metoprolol succinate 25 mg oral tablet, extended release: 1 tab(s) orally once a day (11 Jun 2025 10:26)  Nephplex Rx oral tablet: 1 tab(s) orally once a day (11 Jun 2025 10:26)  NIFEdipine 60 mg oral tablet, extended release: 1 tab(s) orally once a day (11 Jun 2025 10:26)  Prolia 60 mg/mL subcutaneous solution: 60 milligram(s) subcutaneously every 6 months- last dose May 1, 2025 (11 Jun 2025 10:26)  refresh optive 0.5-0.9 % 1 gtt bid prn:  (11 Jun 2025 10:26)  Tradjenta 5 mg oral tablet: 1 tab(s) orally once a day (11 Jun 2025 10:26)  traMADol 50 mg oral tablet: 1 tab(s) orally every 6 hours as needed for  moderate pain (11 Jun 2025 10:26)  Tylenol 500 mg oral tablet: 1 tab(s) orally every 6 hours as needed for  moderate pain (11 Jun 2025 10:26)  Vascepa 1 g oral capsule: 2 cap(s) orally 2 times a day (11 Jun 2025 10:26)      ALLERGIES  No Known Allergies      FAMILY Hx  FAMILY HISTORY:  FH: type 2 diabetes (Sibling)        SOCIAL Hx  Social History:      VITALS  Vital Signs Last 24 Hrs  T(C): 37.2 (19 Jun 2025 19:15), Max: 37.2 (19 Jun 2025 19:15)  T(F): 98.9 (19 Jun 2025 19:15), Max: 98.9 (19 Jun 2025 19:15)  HR: 76 (19 Jun 2025 19:15) (76 - 85)  BP: 172/75 (19 Jun 2025 19:15) (163/75 - 172/75)  BP(mean): --  RR: 16 (19 Jun 2025 19:15) (16 - 18)  SpO2: 100% (19 Jun 2025 19:15) (98% - 100%)    Parameters below as of 19 Jun 2025 19:15  Patient On (Oxygen Delivery Method): room air        PHYSICAL EXAM  Gen: Lying in bed, NAD  Resp: No increased WOB  LLE:  Skin intact, +edema and +ecchymosis over L ankle  +TTP over L ankle, no TTP along remainder of extremity; compartments soft  Limited ROM at ankle 2/2 pain  Motor: TA/EHL/GS/FHL intact  Sensory: DP/SP/Tib/Radha/Saph SILT  +DP pulse, WWP    Secondary survey:  No TTP along spine or other extremities, pelvis grossly stable, SILT and soft compartments throughout    LABS                        10.3   8.98  )-----------( 266      ( 19 Jun 2025 19:26 )             30.5     06-19    140  |  104  |  29[H]  ----------------------------<  121[H]  3.6   |  24  |  0.87    Ca    9.7      19 Jun 2025 19:26    TPro  7.4  /  Alb  3.5  /  TBili  0.4  /  DBili  x   /  AST  21  /  ALT  12  /  AlkPhos  97  06-19    PT/INR - ( 19 Jun 2025 19:26 )   PT: 9.8 sec;   INR: <0.90 ratio         PTT - ( 19 Jun 2025 19:26 )  PTT:25.2 sec    IMAGING  XRs: L ankle fx (personal read)    PROCEDURE  Using aseptic technique, a hematoma block was administered to the fracture site using 10cc of 1% lidocaine without epinephrine. Closed reduction was subsequently performed and a well-padded, well-molded plaster splint applied. The patient tolerated the procedure well without evidence of complications. The patient was neurovascularly intact following reduction. Post-reduction XRs demonstrated acceptable alignment. The patient was informed about splint precautions (keep dry, do not stick anything inside, monitor for signs/symptoms of increased compartmental pressure: uncontrolled pain, worsening numbness/tingling, severe pain with movement of the fingers/toes) and verbalized understanding.    ASSESSMENT & PLAN  84yFemale w/ L ankle fx s/p closed reduction and immobilization.  -noncon CT L ankle on way out  -NWB LLE in a short-leg trilam splint  -splint precautions  -pain control prn  -ice/cold compress, elevation  -no acute ortho surgery at this time  -f/u outpt with Dr. English within 1 week, call office for appt    Clint Berumen PGY1  Orthopedic Surgery  [facility] [id number]  [facility]        [id number]  [facility]  [phone]   HPI  84yFemale w/ c/o L ankle pain s/p mechanical fall today. Unable to bear weight in the LLE since the fall. Denies headstrike or LOC. Denies numbness/tingling in the LLE. Denies any other trauma/injuries at this time. At baseline, community ambulator with walker    ROS  Negative unless otherwise specified in HPI.    PAST MEDICAL & SURGICAL Hx  PAST MEDICAL & SURGICAL HISTORY:  Hypertension      Type 2 diabetes mellitus      CVA (cerebral vascular accident)      Lumbar vertebral fracture      Chronic constipation      History of cholecystectomy          MEDICATIONS  Home Medications:  alendronate 70 mg oral tablet: 1 tab(s) orally once a week (11 Jun 2025 10:26)  calcitriol 0.25 mcg oral capsule: 1 cap(s) orally once a day (11 Jun 2025 10:26)  clopidogrel 75 mg oral tablet: 1 tab(s) orally once a day (11 Jun 2025 10:42)  glipiZIDE 10 mg oral tablet, extended release: 1 tab(s) orally once a day (11 Jun 2025 10:26)  Linzess 145 mcg oral capsule: 1 cap(s) orally once a day as needed for  constipation (11 Jun 2025 10:26)  metoprolol succinate 25 mg oral tablet, extended release: 1 tab(s) orally once a day (11 Jun 2025 10:26)  Nephplex Rx oral tablet: 1 tab(s) orally once a day (11 Jun 2025 10:26)  NIFEdipine 60 mg oral tablet, extended release: 1 tab(s) orally once a day (11 Jun 2025 10:26)  Prolia 60 mg/mL subcutaneous solution: 60 milligram(s) subcutaneously every 6 months- last dose May 1, 2025 (11 Jun 2025 10:26)  refresh optive 0.5-0.9 % 1 gtt bid prn:  (11 Jun 2025 10:26)  Tradjenta 5 mg oral tablet: 1 tab(s) orally once a day (11 Jun 2025 10:26)  traMADol 50 mg oral tablet: 1 tab(s) orally every 6 hours as needed for  moderate pain (11 Jun 2025 10:26)  Tylenol 500 mg oral tablet: 1 tab(s) orally every 6 hours as needed for  moderate pain (11 Jun 2025 10:26)  Vascepa 1 g oral capsule: 2 cap(s) orally 2 times a day (11 Jun 2025 10:26)      ALLERGIES  No Known Allergies      FAMILY Hx  FAMILY HISTORY:  FH: type 2 diabetes (Sibling)        SOCIAL Hx  Social History:      VITALS  Vital Signs Last 24 Hrs  T(C): 37.2 (19 Jun 2025 19:15), Max: 37.2 (19 Jun 2025 19:15)  T(F): 98.9 (19 Jun 2025 19:15), Max: 98.9 (19 Jun 2025 19:15)  HR: 76 (19 Jun 2025 19:15) (76 - 85)  BP: 172/75 (19 Jun 2025 19:15) (163/75 - 172/75)  BP(mean): --  RR: 16 (19 Jun 2025 19:15) (16 - 18)  SpO2: 100% (19 Jun 2025 19:15) (98% - 100%)    Parameters below as of 19 Jun 2025 19:15  Patient On (Oxygen Delivery Method): room air        PHYSICAL EXAM  Gen: Lying in bed, NAD  Resp: No increased WOB  LLE:  Skin intact, +edema and +ecchymosis over L ankle  +TTP over L ankle, no TTP along remainder of extremity; compartments soft  Limited ROM at ankle 2/2 pain  Motor: TA/EHL/GS/FHL intact  Sensory: DP/SP/Tib/Radha/Saph SILT  +DP pulse, WWP    Secondary survey:  No TTP along spine or other extremities, pelvis grossly stable, SILT and soft compartments throughout    LABS                        10.3   8.98  )-----------( 266      ( 19 Jun 2025 19:26 )             30.5     06-19    140  |  104  |  29[H]  ----------------------------<  121[H]  3.6   |  24  |  0.87    Ca    9.7      19 Jun 2025 19:26    TPro  7.4  /  Alb  3.5  /  TBili  0.4  /  DBili  x   /  AST  21  /  ALT  12  /  AlkPhos  97  06-19    PT/INR - ( 19 Jun 2025 19:26 )   PT: 9.8 sec;   INR: <0.90 ratio         PTT - ( 19 Jun 2025 19:26 )  PTT:25.2 sec    IMAGING  < from: Xray Ankle Complete 3 Views, Left (06.19.25 @ 19:03) >  FINDINGS:    Acute displaced fractures of the lateral and medial malleoli with lateral   displacement of both fracture fragments. Additional cortical irregularity   involving the posterior malleolus, which may represent an additional   fracture. There is associated lateral dislocation of the talus relative   to the tibia at the the tibiotalar joint. Diffuse soft tissue swelling is   seen around the ankle. No other acute fracture or dislocation. No knee   joint effusion.        IMPRESSION:  Acute displaced fractures of the left lateral and medial malleoli with   associated lateral tibiotalar joint dislocation.  Questioned additional posterior malleolus fracture.    < end of copied text >      PROCEDURE  Using aseptic technique, a hematoma block was administered to the fracture site using 10cc of 1% lidocaine without epinephrine. Closed reduction was subsequently performed and a well-padded, well-molded plaster splint applied. The patient tolerated the procedure well without evidence of complications. The patient was neurovascularly intact following reduction. Post-reduction XRs demonstrated acceptable alignment. The patient was informed about splint precautions (keep dry, do not stick anything inside, monitor for signs/symptoms of increased compartmental pressure: uncontrolled pain, worsening numbness/tingling, severe pain with movement of the fingers/toes) and verbalized understanding.    ASSESSMENT & PLAN  84yFemale w/ L ankle fx with lateral dislocation s/p closed reduction and immobilization.  -noncon CT L ankle before leaving ED  -NWB LLE in a short-leg trilam splint  -splint precautions  -pain control prn  -ice/cold compress, elevation  -no acute ortho surgery at this time  -f/u outpt with Dr. English within 1 week, call office for appt    Clint Berumen PGY1  Orthopedic Surgery  [facility] [id number]  [facility]        [id number]  [facility]  [phone]

## 2025-06-19 NOTE — ED PROVIDER NOTE - CLINICAL SUMMARY MEDICAL DECISION MAKING FREE TEXT BOX
Patient is an 84-year-old female past medical history hypertension, diabetes, CVA with residual left-sided deficits, recent L4 vertebral augmentation on 6/11/2025 presenting for fall and left ankle deformity. With initial vital signs nonactionable.  Presenting after mechanical fall with left ankle injury and exam showing left ankle deformity and ecchymosis without open component, neurovascularly intact distally.  Otherwise without acute traumatic findings on exam.  Concern for fracture versus dislocation, to evaluate x-ray, screening labs in the event of operative indication, give meds and reassess. Rochelle att: 84-year-old female past medical history hypertension, diabetes, CVA with residual left-sided deficits, recent L4 vertebral augmentation on 6/11/2025 presenting for fall and left ankle deformity. With initial vital signs nonactionable.  Presenting after mechanical fall with left ankle injury and exam showing left ankle deformity and ecchymosis without open component, neurovascularly intact distally.  Otherwise without acute traumatic findings on exam.  Concern for fracture versus dislocation, to evaluate x-ray, screening labs in the event of operative indication, give meds and reassess.

## 2025-06-19 NOTE — ED PROVIDER NOTE - PHYSICAL EXAMINATION
CONSTITUTIONAL: awake, in pain  SKIN: ecchymoses and edema to medial L ankle and foot, no open wounds  HEAD: Normocephalic; atraumatic.  EYES: no conjunctival injection. PERRL. EOMI.  ENT: No nasal discharge; airway clear.  NECK: Supple; non tender to midline, good ROM without pain  CARD: S1, S2 normal; regular rate  RESP: No wheezes, rales or rhonchi. Good air movement bilaterally.   ABD: soft ntnd, no guarding, no distention, no rigidity.   MSK: L ankle with deformity and external angulation, tender b/l ankle, good DP pulse distally, good toes cap refill and sensation and movement. Otherwise Extremities x4 without bony deformity or tenderness to palpation, full range of motion active and passive, with intact pulses, sensation, strength throughout.  Chest wall with clavicles intact, no rib tenderness or bony deformity.  Pelvis stable without tenderness.  Midline cervical, thoracic, lumbar spine without tenderness or step-offs.   NEURO: Alert, oriented, grossly unremarkable

## 2025-06-19 NOTE — ED ADULT NURSE NOTE - NSSEPSISSUSPECTED_ED_A_ED
Take the medications as prescribed.  Continue use of tizanidine and lidocaine patches as prescribed by PCP.  Do not take ibuprofen until the medrol dose esther has been completed.  Contact your orthopedic surgeon in follow-up next week.  Go to the nearest emergency department should you develop loss of bladder or bowel function.   No

## 2025-06-19 NOTE — ED PROVIDER NOTE - NSCAREINITIATED _GEN_ER
ED Provider Note    Scribed for Werner Dumont M.D. by Leonora Verma. 10/23/2017  4:40 AM    Primary care provider: Zahra Yañez M.D.  Means of arrival: EMS  History obtained from: Patient   History limited by: None    CHIEF COMPLAINT  Chief Complaint   Patient presents with   • Fall     Patient complains of falling out of chair tonight and has had multiple falls recently and here via EMS to find out why he keeps falling.       HPI  Bulmaro Wheeler is a 89 y.o. male who presents to the Emergency Department by EMS after the patient slid off the bed and onto the floor. He states he was too weak to stand back up. Patient has been feeling increased weakness in bilateral legs over the last three weeks. He had a similar episode of falling last week. Patient denies cough, fever, abdominal pain, chest pain, or vomiting associated. He is not on blood thinners. Patient denies history of Atrial Fibrillation.     REVIEW OF SYSTEMS  Pertinent positives include falls.   Pertinent negatives include no cough, fever, abdominal pain, chest pain, or vomiting.    All other systems reviewed and negative.    C.    PAST MEDICAL HISTORY   has a past medical history of Acute kidney failure, unspecified (9/27/2013); Arrhythmia; Arthritis; Backpain; Carotid artery stenosis (2/18/2014); CATARACT; Diabetes; Glaucoma; Heart burn; Hypertension; Indigestion; Occlusion and stenosis of carotid artery without mention of cerebral infarction (3/11/2014); Pneumonia; Pyelonephritis (October 2010); Pyelonephritis; Renal cyst (9/19/2013); Sepsis (9/27/2013); Urinary tract infection, site not specified (9/27/2013); and UTI (lower urinary tract infection) (9/1/2012).    SURGICAL HISTORY   has a past surgical history that includes ercp w/removal calculus (2009); eye surgery (1980's); colonoscopy (1999); cholecystectomy (1999); and carotid endarterectomy (3/11/2014).    SOCIAL HISTORY  Social History   Substance Use Topics   • Smoking status:  "Never Smoker   • Smokeless tobacco: Never Used   • Alcohol use No      Comment: hasnt drank since 1979      History   Drug Use No       FAMILY HISTORY  Family History   Problem Relation Age of Onset   • Heart Disease Father    • Diabetes Father    • Alcohol/Drug Father    • Cancer Sister      ovarian   • Stroke Brother      age 90       CURRENT MEDICATIONS  Home Medications     Reviewed by Pat Ferguson R.N. (Registered Nurse) on 10/23/17 at 0431  Med List Status: Not Addressed   Medication Last Dose Status   ASCENSIA MICROFILL TEST strip 10/19/2017 Active   aspirin EC (ECOTRIN) 81 MG Tablet Delayed Response 10/19/2017 Active   atorvastatin (LIPITOR) 10 MG Tab  Active   B Complex Vitamins (VITAMIN B COMPLEX PO) 10/19/2017 Active   Cholecalciferol (VITAMIN D PO) 10/19/2017 Active   finasteride (PROSCAR) 5 MG Tab 10/19/2017 Active   latanoprost (XALATAN) 0.005 % Solution  Active   metformin (GLUCOPHAGE) 500 MG Tab 10/19/2017 Active   Multiple Vitamins-Minerals (CENTRUM SILVER PO) 10/19/2017 Active   Multiple Vitamins-Minerals (OCUVITE) Tab 10/19/2017 Active   Omega-3 Fatty Acids (FISH OIL) 1000 MG Cap capsule 10/19/2017 Active   Probiotic Product (PROBIOTIC PO) 10/19/2017 Active   trazodone (DESYREL) 50 MG Tab  Active                ALLERGIES  No Known Allergies    PHYSICAL EXAM  VITAL SIGNS: /65   Pulse (!) 105   Temp 36.8 °C (98.2 °F)   Resp 16   Ht 1.727 m (5' 8\")   Wt 72.6 kg (160 lb)   SpO2 94%   BMI 24.33 kg/m²     Nursing note and vitals reviewed.  Constitutional: Elderly in appearance. No distress.   HENT: Head is normocephalic and atraumatic. Oropharynx is clear and moist without exudate or erythema.   Eyes: Pupils are equal, round, and reactive to light. Conjunctiva are normal.   Cardiovascular: Tachycardic rate and irregularly irregular rhythm. No murmur heard. Normal radial pulses.  Pulmonary/Chest: Breath sounds normal. No wheezes or rales.   Abdominal: Soft and non-tender. No distention "    Musculoskeletal: Extremities exhibit normal range of motion without edema or tenderness.   Neurological: Awake, alert and oriented to person, place, and time. No focal deficits noted.  Skin: Skin is warm and dry. No rash.   Psychiatric: Normal mood and affect. Appropriate for clinical situation    DIAGNOSTIC STUDIES / PROCEDURES    EKG Interpretation  See below.    LABS  Results for orders placed or performed during the hospital encounter of 10/23/17   CBC w/ Differential   Result Value Ref Range    WBC 13.7 (H) 4.8 - 10.8 K/uL    RBC 3.97 (L) 4.70 - 6.10 M/uL    Hemoglobin 12.1 (L) 14.0 - 18.0 g/dL    Hematocrit 35.7 (L) 42.0 - 52.0 %    MCV 89.9 81.4 - 97.8 fL    MCH 30.5 27.0 - 33.0 pg    MCHC 33.9 33.7 - 35.3 g/dL    RDW 45.1 35.9 - 50.0 fL    Platelet Count 145 (L) 164 - 446 K/uL    MPV 10.2 9.0 - 12.9 fL    Neutrophils-Polys 86.60 (H) 44.00 - 72.00 %    Lymphocytes 4.90 (L) 22.00 - 41.00 %    Monocytes 8.00 0.00 - 13.40 %    Eosinophils 0.00 0.00 - 6.90 %    Basophils 0.10 0.00 - 1.80 %    Immature Granulocytes 0.40 0.00 - 0.90 %    Nucleated RBC 0.00 /100 WBC    Neutrophils (Absolute) 11.88 (H) 1.82 - 7.42 K/uL    Lymphs (Absolute) 0.67 (L) 1.00 - 4.80 K/uL    Monos (Absolute) 1.09 (H) 0.00 - 0.85 K/uL    Eos (Absolute) 0.00 0.00 - 0.51 K/uL    Baso (Absolute) 0.01 0.00 - 0.12 K/uL    Immature Granulocytes (abs) 0.05 0.00 - 0.11 K/uL    NRBC (Absolute) 0.00 K/uL   Basic Metabolic Panel (BMP)   Result Value Ref Range    Sodium 132 (L) 135 - 145 mmol/L    Potassium 3.8 3.6 - 5.5 mmol/L    Chloride 97 96 - 112 mmol/L    Co2 21 20 - 33 mmol/L    Glucose 199 (H) 65 - 99 mg/dL    Bun 29 (H) 8 - 22 mg/dL    Creatinine 1.03 0.50 - 1.40 mg/dL    Calcium 9.3 8.5 - 10.5 mg/dL    Anion Gap 14.0 (H) 0.0 - 11.9   Btype Natriuretic Peptide   Result Value Ref Range    B Natriuretic Peptide 190 (H) 0 - 100 pg/mL   Troponin STAT   Result Value Ref Range    Troponin I 0.05 (H) 0.00 - 0.04 ng/mL   PT/INR   Result Value Ref  Range    PT 16.3 (H) 12.0 - 14.6 sec    INR 1.27 (H) 0.87 - 1.13   APTT   Result Value Ref Range    APTT 32.2 24.7 - 36.0 sec   ESTIMATED GFR   Result Value Ref Range    GFR If African American >60 >60 mL/min/1.73 m 2    GFR If Non African American >60 >60 mL/min/1.73 m 2       All labs reviewed by me.    RADIOLOGY  DX-CHEST-PORTABLE (1 VIEW)   Final Result      No acute cardiopulmonary abnormality.      CT-HEAD W/O   Final Result      1.  No acute intracranial abnormality.   2.  Age-consistent atrophy and chronic white matter changes.               INTERPRETING LOCATION:  [address], [place], [address]      ECHOCARDIOGRAM COMP W/O CONT    (Results Pending)     The radiologist's interpretation of all radiological studies have been reviewed by me.    COURSE & MEDICAL DECISION MAKING  Nursing notes, VS, PMSFHx reviewed in chart.     Review of past medical records shows the patient was seen four days ago by his PCP for routine check-up.      4:40 AM - Patient seen and examined at bedside. Patient will be treated with 10mg Cardizem and IV fluids for tachycardia. Ordered CT-Head W/O, CBC with differential, BMP, BNP, Troponin STAT, PT/INR, APTT, DX-chest, EKG to evaluate his symptoms. The differential diagnoses include but are not limited to:Electrolyte abnormality, urinary tract infection, intracranial hemorrhage, arrhythmia.    EKG is remarkable for atrial fibrillation with rapid ventricular response. Atrial fibrillation seems to be new. I reviewed prior EKGs and the patient was in normal sinus rhythm at that time.    I was going to treat the patient with diltiazem for rate control. However without treatment the patient seems to be rate controlled at this time. Therefore we will hold the diltiazem.    The patient will be admitted to the hospital for further evaluation. I spoke with the on-call hospice for admission.    6:30 AM - I discussed the patient's case and the above findings with Dr. Aggarwal (Hospitalist) who agrees  to admit the patient.       DISPOSITION:  Patient will be admitted to Dr. Aggarwal (Hospitalist) in guarded condition.      FINAL IMPRESSION  1. Fall, initial encounter    2. Generalized weakness    3. Atrial fibrillation with RVR (CMS-HCC)    4. Elevated troponin          I, Leonora Verma (Scribe), am scribing for, and in the presence of, Werner Dumont M.D..    Electronically signed by: Leonora Verma (Scribe), 10/23/2017    I, Werner Dumont M.D. personally performed the services described in this documentation, as scribed by Leonora Verma in my presence, and it is both accurate and complete.    The note accurately reflects work and decisions made by me.  Werner Dumont  10/23/2017  11:26 AM     Ky Ward(Resident)

## 2025-06-19 NOTE — ED ADULT NURSE NOTE - OBJECTIVE STATEMENT
Patient is Mandarin speaking with her daughter translating. Patient fell last night injuring her left ankle, swelling and bruising noted, pain.. Awaiting x ray.

## 2025-06-19 NOTE — ED ADULT NURSE NOTE - NS ED NURSE RECORD ANOTHER HT AND WT
[N] : Patient reports normal menses [Y] : Positive pregnancy history [Regular Cycle Intervals] : periods have been regular [Currently Active] : currently active [Men] : men [Vaginal] : vaginal [No] : No [Patient refuses STI testing] : Patient refuses STI testing [LMPDate] : 5/6/22 [PGHxTotal] : 2 [[facility]xFulerm] : 1 [PGHxPremature] : 0 [PGHxAbortions] : 0 [[facility] Centeriving] : 1 [PGHxABInduced] : 0 [PGHxABSpont] : 1 [PGHxEctopic] : 0 [PGHxMultBirths] : 0 [FreeTextEntry1] : 5/6/22 Yes

## 2025-06-19 NOTE — ED ADULT TRIAGE NOTE - CHIEF COMPLAINT QUOTE
pt living with DM type2, recent L4 vertebral augmentation one week ago, pt fell last night, neg LOC, c/o of lt ankle pain and swelling since, pt on plavix

## 2025-06-19 NOTE — ED PROVIDER NOTE - OBJECTIVE STATEMENT
Patient is an 84-year-old female past medical history hypertension, diabetes, CVA with residual left-sided deficits, recent L4 vertebral augmentation on 6/11/2025 presenting for fall and left ankle deformity.  Patient with daughter at bedside, states that the patient typically has left-sided weakness and that this is unchanged, following her procedure has had some generalized weakness without focal worsening, last night when getting up from bed in order to use the bedside commode had her left leg buckled underneath her and twisted her left ankle, falling onto her left ankle.  Did not hit head, had no pain elsewhere, was only on the ground for a few minutes before being helped up.  Was unable to ambulate afterwards.  Has pain to the left ankle currently, no pain elsewhere to other extremities or trunk or head or neck.  Last took Tylenol approximately 6 hours ago for pain, with some improvement.  Can still feel and move the toes of the affected extremity.  Has had significant swelling this morning.  Takes Plavix following the stroke, resumed 2 days following her procedure.

## 2025-06-20 DIAGNOSIS — N18.2 CHRONIC KIDNEY DISEASE, STAGE 2 (MILD): ICD-10-CM

## 2025-06-20 DIAGNOSIS — I63.9 CEREBRAL INFARCTION, UNSPECIFIED: ICD-10-CM

## 2025-06-20 DIAGNOSIS — I10 ESSENTIAL (PRIMARY) HYPERTENSION: ICD-10-CM

## 2025-06-20 DIAGNOSIS — Z29.9 ENCOUNTER FOR PROPHYLACTIC MEASURES, UNSPECIFIED: ICD-10-CM

## 2025-06-20 DIAGNOSIS — E78.5 HYPERLIPIDEMIA, UNSPECIFIED: ICD-10-CM

## 2025-06-20 DIAGNOSIS — E11.9 TYPE 2 DIABETES MELLITUS WITHOUT COMPLICATIONS: ICD-10-CM

## 2025-06-20 DIAGNOSIS — S82.899A OTHER FRACTURE OF UNSPECIFIED LOWER LEG, INITIAL ENCOUNTER FOR CLOSED FRACTURE: ICD-10-CM

## 2025-06-20 LAB
ANION GAP SERPL CALC-SCNC: 11 MMOL/L — SIGNIFICANT CHANGE UP (ref 7–14)
APPEARANCE UR: CLEAR — SIGNIFICANT CHANGE UP
BACTERIA # UR AUTO: ABNORMAL /HPF
BILIRUB UR-MCNC: NEGATIVE — SIGNIFICANT CHANGE UP
BUN SERPL-MCNC: 18 MG/DL — SIGNIFICANT CHANGE UP (ref 7–23)
CALCIUM SERPL-MCNC: 8.7 MG/DL — SIGNIFICANT CHANGE UP (ref 8.4–10.5)
CAST: 1 /LPF — SIGNIFICANT CHANGE UP (ref 0–4)
CHLORIDE SERPL-SCNC: 104 MMOL/L — SIGNIFICANT CHANGE UP (ref 98–107)
CO2 SERPL-SCNC: 25 MMOL/L — SIGNIFICANT CHANGE UP (ref 22–31)
COLOR SPEC: YELLOW — SIGNIFICANT CHANGE UP
CREAT SERPL-MCNC: 0.72 MG/DL — SIGNIFICANT CHANGE UP (ref 0.5–1.3)
DIFF PNL FLD: NEGATIVE — SIGNIFICANT CHANGE UP
EGFR: 83 ML/MIN/1.73M2 — SIGNIFICANT CHANGE UP
EGFR: 83 ML/MIN/1.73M2 — SIGNIFICANT CHANGE UP
GLUCOSE BLDC GLUCOMTR-MCNC: 134 MG/DL — HIGH (ref 70–99)
GLUCOSE BLDC GLUCOMTR-MCNC: 144 MG/DL — HIGH (ref 70–99)
GLUCOSE BLDC GLUCOMTR-MCNC: 161 MG/DL — HIGH (ref 70–99)
GLUCOSE BLDC GLUCOMTR-MCNC: 212 MG/DL — HIGH (ref 70–99)
GLUCOSE BLDC GLUCOMTR-MCNC: 215 MG/DL — HIGH (ref 70–99)
GLUCOSE SERPL-MCNC: 149 MG/DL — HIGH (ref 70–99)
GLUCOSE UR QL: 100 MG/DL
HCT VFR BLD CALC: 32.3 % — LOW (ref 34.5–45)
HGB BLD-MCNC: 10.9 G/DL — LOW (ref 11.5–15.5)
KETONES UR QL: NEGATIVE MG/DL — SIGNIFICANT CHANGE UP
LEUKOCYTE ESTERASE UR-ACNC: NEGATIVE — SIGNIFICANT CHANGE UP
MAGNESIUM SERPL-MCNC: 2.1 MG/DL — SIGNIFICANT CHANGE UP (ref 1.6–2.6)
MCHC RBC-ENTMCNC: 31.3 PG — SIGNIFICANT CHANGE UP (ref 27–34)
MCHC RBC-ENTMCNC: 33.7 G/DL — SIGNIFICANT CHANGE UP (ref 32–36)
MCV RBC AUTO: 92.8 FL — SIGNIFICANT CHANGE UP (ref 80–100)
NITRITE UR-MCNC: NEGATIVE — SIGNIFICANT CHANGE UP
NRBC # BLD AUTO: 0 K/UL — SIGNIFICANT CHANGE UP (ref 0–0)
NRBC # FLD: 0 K/UL — SIGNIFICANT CHANGE UP (ref 0–0)
NRBC BLD AUTO-RTO: 0 /100 WBCS — SIGNIFICANT CHANGE UP (ref 0–0)
PH UR: 7 — SIGNIFICANT CHANGE UP (ref 5–8)
PHOSPHATE SERPL-MCNC: 2.4 MG/DL — LOW (ref 2.5–4.5)
PLATELET # BLD AUTO: 249 K/UL — SIGNIFICANT CHANGE UP (ref 150–400)
PMV BLD: 9.9 FL — SIGNIFICANT CHANGE UP (ref 7–13)
POTASSIUM SERPL-MCNC: 3.4 MMOL/L — LOW (ref 3.5–5.3)
POTASSIUM SERPL-SCNC: 3.4 MMOL/L — LOW (ref 3.5–5.3)
PROT UR-MCNC: 300 MG/DL
RBC # BLD: 3.48 M/UL — LOW (ref 3.8–5.2)
RBC # FLD: 14.6 % — HIGH (ref 10.3–14.5)
RBC CASTS # UR COMP ASSIST: 2 /HPF — SIGNIFICANT CHANGE UP (ref 0–4)
SODIUM SERPL-SCNC: 140 MMOL/L — SIGNIFICANT CHANGE UP (ref 135–145)
SP GR SPEC: 1.02 — SIGNIFICANT CHANGE UP (ref 1–1.03)
SQUAMOUS # UR AUTO: 1 /HPF — SIGNIFICANT CHANGE UP (ref 0–5)
UROBILINOGEN FLD QL: 0.2 MG/DL — SIGNIFICANT CHANGE UP (ref 0.2–1)
WBC # BLD: 8.21 K/UL — SIGNIFICANT CHANGE UP (ref 3.8–10.5)
WBC # FLD AUTO: 8.21 K/UL — SIGNIFICANT CHANGE UP (ref 3.8–10.5)
WBC UR QL: 6 /HPF — HIGH (ref 0–5)

## 2025-06-20 PROCEDURE — 73700 CT LOWER EXTREMITY W/O DYE: CPT | Mod: 26,LT

## 2025-06-20 PROCEDURE — 99223 1ST HOSP IP/OBS HIGH 75: CPT

## 2025-06-20 RX ORDER — ACETAMINOPHEN 500 MG/5ML
650 LIQUID (ML) ORAL EVERY 6 HOURS
Refills: 0 | Status: DISCONTINUED | OUTPATIENT
Start: 2025-06-20 | End: 2025-07-01

## 2025-06-20 RX ORDER — DEXTROSE 50 % IN WATER 50 %
15 SYRINGE (ML) INTRAVENOUS ONCE
Refills: 0 | Status: DISCONTINUED | OUTPATIENT
Start: 2025-06-20 | End: 2025-07-01

## 2025-06-20 RX ORDER — ENOXAPARIN SODIUM 100 MG/ML
40 INJECTION SUBCUTANEOUS EVERY 24 HOURS
Refills: 0 | Status: DISCONTINUED | OUTPATIENT
Start: 2025-06-20 | End: 2025-06-26

## 2025-06-20 RX ORDER — METOPROLOL SUCCINATE 50 MG/1
25 TABLET, EXTENDED RELEASE ORAL DAILY
Refills: 0 | Status: DISCONTINUED | OUTPATIENT
Start: 2025-06-20 | End: 2025-06-29

## 2025-06-20 RX ORDER — B1/B2/B3/B5/B6/B12/VIT C/FOLIC 500-0.5 MG
1 TABLET ORAL DAILY
Refills: 0 | Status: DISCONTINUED | OUTPATIENT
Start: 2025-06-20 | End: 2025-07-01

## 2025-06-20 RX ORDER — SOD PHOS DI, MONO/K PHOS MONO 250 MG
1 TABLET ORAL ONCE
Refills: 0 | Status: COMPLETED | OUTPATIENT
Start: 2025-06-20 | End: 2025-06-20

## 2025-06-20 RX ORDER — ATORVASTATIN CALCIUM 80 MG/1
1 TABLET, FILM COATED ORAL
Refills: 0 | DISCHARGE

## 2025-06-20 RX ORDER — SODIUM CHLORIDE 9 G/1000ML
1000 INJECTION, SOLUTION INTRAVENOUS
Refills: 0 | Status: DISCONTINUED | OUTPATIENT
Start: 2025-06-20 | End: 2025-07-01

## 2025-06-20 RX ORDER — MAGNESIUM, ALUMINUM HYDROXIDE 200-200 MG
30 TABLET,CHEWABLE ORAL EVERY 4 HOURS
Refills: 0 | Status: DISCONTINUED | OUTPATIENT
Start: 2025-06-20 | End: 2025-07-01

## 2025-06-20 RX ORDER — ISOSORBIDE MONONITRATE 60 MG/1
30 TABLET, EXTENDED RELEASE ORAL DAILY
Refills: 0 | Status: DISCONTINUED | OUTPATIENT
Start: 2025-06-20 | End: 2025-07-01

## 2025-06-20 RX ORDER — DEXTROSE 50 % IN WATER 50 %
12.5 SYRINGE (ML) INTRAVENOUS ONCE
Refills: 0 | Status: DISCONTINUED | OUTPATIENT
Start: 2025-06-20 | End: 2025-07-01

## 2025-06-20 RX ORDER — CLOPIDOGREL BISULFATE 75 MG/1
75 TABLET, FILM COATED ORAL DAILY
Refills: 0 | Status: DISCONTINUED | OUTPATIENT
Start: 2025-06-20 | End: 2025-06-24

## 2025-06-20 RX ORDER — INSULIN LISPRO 100 U/ML
INJECTION, SOLUTION INTRAVENOUS; SUBCUTANEOUS
Refills: 0 | Status: DISCONTINUED | OUTPATIENT
Start: 2025-06-20 | End: 2025-07-01

## 2025-06-20 RX ORDER — GLUCAGON 3 MG/1
1 POWDER NASAL ONCE
Refills: 0 | Status: DISCONTINUED | OUTPATIENT
Start: 2025-06-20 | End: 2025-07-01

## 2025-06-20 RX ORDER — MELATONIN 5 MG
3 TABLET ORAL AT BEDTIME
Refills: 0 | Status: DISCONTINUED | OUTPATIENT
Start: 2025-06-20 | End: 2025-07-01

## 2025-06-20 RX ORDER — FINERENONE 10 MG/1
1 TABLET, FILM COATED ORAL
Refills: 0 | DISCHARGE

## 2025-06-20 RX ORDER — LANOLIN/MINERAL OIL/PETROLATUM
1 OINTMENT (GRAM) OPHTHALMIC (EYE)
Refills: 0 | Status: DISCONTINUED | OUTPATIENT
Start: 2025-06-20 | End: 2025-07-01

## 2025-06-20 RX ORDER — OXYCODONE HYDROCHLORIDE 30 MG/1
5 TABLET ORAL EVERY 6 HOURS
Refills: 0 | Status: DISCONTINUED | OUTPATIENT
Start: 2025-06-20 | End: 2025-06-26

## 2025-06-20 RX ORDER — ACETAMINOPHEN 500 MG/5ML
1 LIQUID (ML) ORAL
Refills: 0 | DISCHARGE

## 2025-06-20 RX ORDER — CALCITRIOL 0.5 UG/1
0.25 CAPSULE, GELATIN COATED ORAL DAILY
Refills: 0 | Status: DISCONTINUED | OUTPATIENT
Start: 2025-06-20 | End: 2025-07-01

## 2025-06-20 RX ORDER — NIFEDIPINE 30 MG
60 TABLET, EXTENDED RELEASE 24 HR ORAL DAILY
Refills: 0 | Status: DISCONTINUED | OUTPATIENT
Start: 2025-06-20 | End: 2025-06-29

## 2025-06-20 RX ORDER — LINAGLIPTIN 5 MG/1
1 TABLET, FILM COATED ORAL
Refills: 0 | DISCHARGE

## 2025-06-20 RX ORDER — DEXTROSE 50 % IN WATER 50 %
25 SYRINGE (ML) INTRAVENOUS ONCE
Refills: 0 | Status: DISCONTINUED | OUTPATIENT
Start: 2025-06-20 | End: 2025-07-01

## 2025-06-20 RX ORDER — OXYCODONE HYDROCHLORIDE 30 MG/1
2.5 TABLET ORAL EVERY 6 HOURS
Refills: 0 | Status: DISCONTINUED | OUTPATIENT
Start: 2025-06-20 | End: 2025-06-26

## 2025-06-20 RX ORDER — DOCUSATE SODIUM 100 MG
3 CAPSULE ORAL
Refills: 0 | DISCHARGE

## 2025-06-20 RX ORDER — LANOLIN/MINERAL OIL/PETROLATUM
1 OINTMENT (GRAM) OPHTHALMIC (EYE)
Refills: 0 | Status: DISCONTINUED | OUTPATIENT
Start: 2025-06-20 | End: 2025-06-20

## 2025-06-20 RX ORDER — INSULIN LISPRO 100 U/ML
INJECTION, SOLUTION INTRAVENOUS; SUBCUTANEOUS AT BEDTIME
Refills: 0 | Status: DISCONTINUED | OUTPATIENT
Start: 2025-06-20 | End: 2025-07-01

## 2025-06-20 RX ORDER — TRAMADOL HYDROCHLORIDE 50 MG/1
1 TABLET, FILM COATED ORAL
Refills: 0 | DISCHARGE

## 2025-06-20 RX ORDER — ATORVASTATIN CALCIUM 80 MG/1
20 TABLET, FILM COATED ORAL AT BEDTIME
Refills: 0 | Status: DISCONTINUED | OUTPATIENT
Start: 2025-06-20 | End: 2025-07-01

## 2025-06-20 RX ORDER — ONDANSETRON HCL/PF 4 MG/2 ML
4 VIAL (ML) INJECTION EVERY 8 HOURS
Refills: 0 | Status: DISCONTINUED | OUTPATIENT
Start: 2025-06-20 | End: 2025-07-01

## 2025-06-20 RX ORDER — ISOSORBIDE MONONITRATE 60 MG/1
1 TABLET, EXTENDED RELEASE ORAL
Refills: 0 | DISCHARGE

## 2025-06-20 RX ORDER — DENOSUMAB 60 MG/ML
60 INJECTION SUBCUTANEOUS
Refills: 0 | DISCHARGE

## 2025-06-20 RX ADMIN — Medication 1 TABLET(S): at 12:29

## 2025-06-20 RX ADMIN — METOPROLOL SUCCINATE 25 MILLIGRAM(S): 50 TABLET, EXTENDED RELEASE ORAL at 06:11

## 2025-06-20 RX ADMIN — CLOPIDOGREL BISULFATE 75 MILLIGRAM(S): 75 TABLET, FILM COATED ORAL at 12:30

## 2025-06-20 RX ADMIN — INSULIN LISPRO 1: 100 INJECTION, SOLUTION INTRAVENOUS; SUBCUTANEOUS at 12:30

## 2025-06-20 RX ADMIN — Medication 20 MILLIEQUIVALENT(S): at 12:30

## 2025-06-20 RX ADMIN — INSULIN LISPRO 2: 100 INJECTION, SOLUTION INTRAVENOUS; SUBCUTANEOUS at 16:22

## 2025-06-20 RX ADMIN — ISOSORBIDE MONONITRATE 30 MILLIGRAM(S): 60 TABLET, EXTENDED RELEASE ORAL at 12:29

## 2025-06-20 RX ADMIN — Medication 60 MILLIGRAM(S): at 06:10

## 2025-06-20 RX ADMIN — Medication 1 PACKET(S): at 12:30

## 2025-06-20 RX ADMIN — CALCITRIOL 0.25 MICROGRAM(S): 0.5 CAPSULE, GELATIN COATED ORAL at 12:29

## 2025-06-20 RX ADMIN — ENOXAPARIN SODIUM 40 MILLIGRAM(S): 100 INJECTION SUBCUTANEOUS at 16:18

## 2025-06-20 NOTE — H&P ADULT - HISTORY OF PRESENT ILLNESS
85 y/o F with PMHx of HTN, DM2, CVA with residual left-sided deficits, recurrent UTIs, HLD, CKD stage, recent L4 vertebral augmentation (06/11/25) presented to the ED due to mechanical fall with left ankle deformity. Patient's daughter was at bedside and provided further information. She stated that patient has left sided weakness and that it has been unchanged and following her recent procedure she has had some generalized weakness. She was attempting to get up from bed last night in order to use the bedside commode and her left leg buckled underneath her and twisted her left ankle. She fell onto her left ankle. She denies head trauma or LOC. She was on the ground for a few minutes before being helped up and was unable to ambulate afterwards.   XR left foot demonstrated acute displaced fractures of the left lateral and medial malleoli with associated lateral tibiotalar joint dislocation. XR pelvis demonstrated no acute fracture or dislocation. Orthopedic surgery was consulted and performed closed reduction and immobilization with placement of splint.   XR Left ankle after orthopedic intervention demonstrated s/p casting and reduction of acute displaced fractures of the lateral and medial malleoli and distal fibular metadiaphysis with improved alignment. Prelim CT left ankle demonstrated acute comminuted mildly displaced trimalleolar fractures.  In the ED she received IV Ofirmev x 1, IV morphine 4 mg x 3.  Daughter was at bedside and requested to translate instead of using the .   85 y/o F with PMHx of HTN, DM2, CVA with residual left-sided deficits, recurrent UTIs, HLD, CKD stage II, recent L4 vertebral augmentation (06/11/25) presented to the ED due to mechanical fall with left ankle deformity. Patient's daughter was at bedside and provided further information. She stated that patient has left sided weakness and that it has been unchanged and following her recent procedure she has had some generalized weakness. She was attempting to get up from bed last night in order to use the bedside commode and her left leg buckled underneath her and twisted her left ankle. She fell onto her left ankle. She denies head trauma or LOC. She was on the ground for a few minutes before being helped up and was unable to ambulate afterwards.   XR left foot demonstrated acute displaced fractures of the left lateral and medial malleoli with associated lateral tibiotalar joint dislocation. XR pelvis demonstrated no acute fracture or dislocation. Orthopedic surgery was consulted and performed closed reduction and immobilization with placement of splint.   XR Left ankle after orthopedic intervention demonstrated s/p casting and reduction of acute displaced fractures of the lateral and medial malleoli and distal fibular metadiaphysis with improved alignment. Prelim CT left ankle demonstrated acute comminuted mildly displaced trimalleolar fractures.  In the ED she received IV Ofirmev x 1, IV morphine 4 mg x 3.   Of note patient underwent L4 vertebral augmentation with spine kyle and 4.5 cc of cement by IR on 06/11/2025.

## 2025-06-20 NOTE — DISCHARGE NOTE PROVIDER - NSDCCPCAREPLAN_GEN_ALL_CORE_FT
PRINCIPAL DISCHARGE DIAGNOSIS  Diagnosis: Fracture, ankle  Assessment and Plan of Treatment: you were seen by orthopedic doctor, splint was placed, follow up with ortho doctor as outpatient for further management      SECONDARY DISCHARGE DIAGNOSES  Diagnosis: HTN (hypertension)  Assessment and Plan of Treatment: take your medication and follow up with pcp     PRINCIPAL DISCHARGE DIAGNOSIS  Diagnosis: Fracture, ankle  Assessment and Plan of Treatment: You were seen by orthopedic doctor, splint was placed and left ankle surgery was done on 06/27/2025.   Your symptoms improved and you are now stable to be discharged to Rehab.  Please take your medications as directed and follow-up with your primary care physician and orthopedic doctors within 1-2 weeks of discharge.  It was our pleasure taking care of you.      SECONDARY DISCHARGE DIAGNOSES  Diagnosis: HTN (hypertension)  Assessment and Plan of Treatment: please take your medications as directed and follow up with your pcp     PRINCIPAL DISCHARGE DIAGNOSIS  Diagnosis: Fracture, ankle  Assessment and Plan of Treatment: You were seen by orthopedic doctor, splint was placed and left ankle surgery was done on 06/27/2025.   Your symptoms improved and you are now stable to be discharged to Rehab.  Please take your medications as directed and follow-up orthopedics Dr. Jesus in 10 days.      SECONDARY DISCHARGE DIAGNOSES  Diagnosis: HTN (hypertension)  Assessment and Plan of Treatment: Your nifedipine was increased to 90mg daily  Please continue your imdur and metoprolol as directed and follow-up for BP check wiht PCP within 1-2 weeks    Diagnosis: Type 2 diabetes mellitus  Assessment and Plan of Treatment: You were treated with insulin in the hospital due to high blood sugars  Please resume home oral antihyperglycemics on discharge and follow-up with PCP for close monitoring

## 2025-06-20 NOTE — DISCHARGE NOTE PROVIDER - NSDCFUSCHEDAPPT_GEN_ALL_CORE_FT
Malcom Reardon  [facility] Physician [facility]  INTERV [phone]  Scheduled Appointment: 07/15/2025

## 2025-06-20 NOTE — H&P ADULT - PROBLEM SELECTOR PLAN 2
Home medications: Nifedipine 60 mg daily, metoprolol succinate 25 mg daily and Imdur ER 30 mg daily     Nifedipine 60 mg daily   Metoprolol succinate 25 mg daily   Imdur ER 30 mg daily   Monitor BP and adjust accordingly

## 2025-06-20 NOTE — ED ADULT NURSE REASSESSMENT NOTE - NS ED NURSE REASSESS COMMENT FT1
Pt awake and alert, baseline mentation, resp even and unlabored. Denies CP, SOB, HA, dizziness, palpitations, blurry vision. Resting comfortably. Wrap on L ankle remains in place from ortho, pt denies pain at this time. Pt positioned for comfort & remains on Primafit. Vitals as documented, no acute distress noted. Awaiting bed assignment.
Pt received at change of shift. Family at bedside translating. Pt laying in bed, NAD, respirations even and unlabored. VS in flow sheet. Pt offers no complaints at this time. Bed in lowest position, safety maintained. Transport at bedside.
Pt received from [facility] MANDY Boyer; awake and alert, A&OX4, resp even and unlabored. Denies CP, SOB, HA, dizziness, palpitations, blurry vision. Resting comfortably. Pt assisted with elimination, clean and dry sheets placed, & placed on primafit for safety & comfort. No acute distress noted, family remains at bedside. Awaiting ortho consult.
Report received from Alejandra calix RN. Pt speaks Mandarin , daughter at bedside to translate. Pt laying in stretcher, no signs and symptoms of distress. Left lower ext splint wrapped by ortho MD dry and intact. Toes warm and mobile. Pt denies any pain at present. Pt's breathing unlabored and easy. Abdomen soft/nondistended nontender. Pt admitted to hospital, daughter and pt aware and verbalized understanding that she is waiting for bed upstairs.
BREAK RN. pt received in spot 18. pt remains at baseline mental status, RR even unlabored completing full sentences. pt resting in stretcher comfortably at this time, no new complaints offered. stretcher lowest position siderails up safety measures in place. Pt w/ bandage on leg unable to move extremity. MD aware. per family at bedside, feels unsafe if pt were to be DC. comfort and safety maintained. call bell within reach.
Pt alert and orientedx4, in no apparent distress. Pt denies chest pain, SOB, lightheadedness, pain. Daughter at bedside, pt given daily meds and provided lunch

## 2025-06-20 NOTE — H&P ADULT - PROBLEM SELECTOR PLAN 3
Home medications: Glipizide 10 mg daily and Tradjenta 5 mg daily     ISS  Monitor CBGs and adjust accordingly

## 2025-06-20 NOTE — H&P ADULT - NSHPLABSRESULTS_GEN_ALL_CORE
10.3   8.98  )-----------( 266      ( 19 Jun 2025 19:26 )             30.5     06-19    140  |  104  |  29[H]  ----------------------------<  121[H]  3.6   |  24  |  0.87    Ca    9.7      19 Jun 2025 19:26    TPro  7.4  /  Alb  3.5  /  TBili  0.4  /  DBili  x   /  AST  21  /  ALT  12  /  AlkPhos  97  06-19    PT/INR - ( 19 Jun 2025 19:26 )   PT: 9.8 sec;   INR: <0.90 ratio         PTT - ( 19 Jun 2025 19:26 )  PTT:25.2 sec  Urinalysis Basic - ( 19 Jun 2025 19:26 )    Color: x / Appearance: x / SG: x / pH: x  Gluc: 121 mg/dL / Ketone: x  / Bili: x / Urobili: x   Blood: x / Protein: x / Nitrite: x   Leuk Esterase: x / RBC: x / WBC x   Sq Epi: x / Non Sq Epi: x / Bacteria: x    RADIOLOGY, EKG & ADDITIONAL TESTS: Reviewed.

## 2025-06-20 NOTE — DISCHARGE NOTE PROVIDER - NSDCMRMEDTOKEN_GEN_ALL_CORE_FT
atorvastatin 20 mg oral tablet: 1 tab(s) orally once a day  calcitriol 0.25 mcg oral capsule: 1 cap(s) orally once a day  clopidogrel 75 mg oral tablet: 1 tab(s) orally once a day  Colace 100 mg oral capsule: 3 cap(s) orally once a day  diclofenac 1.3%/12 hr topical film, extended release: Apply topically to affected area every 12 hours  glipiZIDE 10 mg oral tablet, extended release: 1 tab(s) orally once a day  isosorbide mononitrate 30 mg oral tablet, extended release: 1 tab(s) orally once a day (in the morning)  Kerendia 10 mg oral tablet: 1 tab(s) orally once a day  Linzess 145 mcg oral capsule: 1 cap(s) orally once a day as needed for  constipation  metoprolol succinate 25 mg oral tablet, extended release: 1 tab(s) orally once a day  Nephplex Rx oral tablet: 1 tab(s) orally once a day  NIFEdipine 60 mg oral tablet, extended release: 1 tab(s) orally once a day  refresh optive 0.5-0.9 % 1 gtt bid prn:   Tradjenta 5 mg oral tablet: 1 tab(s) orally once a day  traMADol 50 mg oral tablet: 1 tab(s) orally every 6 hours as needed for  moderate pain  Tylenol 500 mg oral tablet: 1 tab(s) orally every 6 hours as needed for  moderate pain  Vascepa 1 g oral capsule: 2 cap(s) orally 2 times a day   aluminum hydroxide-magnesium hydroxide 200 mg-200 mg/5 mL oral suspension: 30 milliliter(s) orally every 4 hours As needed Dyspepsia  Artificial Tears ophthalmic solution: 1 drop(s) in each eye 2 times a day as needed for  dry eyes  aspirin 81 mg oral delayed release tablet: 1 tab(s) orally once a day  atorvastatin 20 mg oral tablet: 1 tab(s) orally once a day  calcitriol 0.25 mcg oral capsule: 1 cap(s) orally once a day  clopidogrel 75 mg oral tablet: 1 tab(s) orally once a day  Colace 100 mg oral capsule: 3 cap(s) orally once a day  glipiZIDE 10 mg oral tablet, extended release: 1 tab(s) orally once a day  isosorbide mononitrate 30 mg oral tablet, extended release: 1 tab(s) orally once a day (in the morning)  Kerendia 10 mg oral tablet: 1 tab(s) orally once a day  Linzess 145 mcg oral capsule: 1 cap(s) orally once a day as needed for  constipation  melatonin 3 mg oral tablet: 1 tab(s) orally once a day (at bedtime) As needed Insomnia  metoprolol succinate 25 mg oral tablet, extended release: 1 tab(s) orally once a day  Nephplex Rx oral tablet: 1 tab(s) orally once a day  NIFEdipine 90 mg oral tablet, extended release: 1 tab(s) orally once a day  oxyCODONE: 2.5 milligram(s) orally every 6 hours as needed for  moderate pain  oxyCODONE 5 mg oral tablet: 1 tab(s) orally every 6 hours As needed Severe Pain (7 - 10)  senna leaf extract oral tablet: 2 tab(s) orally once a day (at bedtime)  Tradjenta 5 mg oral tablet: 1 tab(s) orally once a day  Vascepa 1 g oral capsule: 2 cap(s) orally 2 times a day

## 2025-06-20 NOTE — H&P ADULT - PROBLEM SELECTOR PROBLEM 2
Patient returns call and apologizes, she states she was advised to call PCP because their office was having trouble location the results. Patient was able to get results this morning.       HTN (hypertension) none noted

## 2025-06-20 NOTE — H&P ADULT - NSHPPHYSICALEXAM_GEN_ALL_CORE
General: Elderly woman lying in bed comfortably. Is in no acute distress  Eyes: PERRL, EOMI  Resp: CTA b/l. No wheezing, rales or rhonchi. Saturating 100% on room air  CVS: RRR. S1 & S2+. No murmurs, rubs or gallops  GI: Soft, NDNT. Bowel sounds x 4 qudrants   Extremities: LLE with splint in place. RLE with no cyanosis, edema or clubbing  Neuro: Alert and oriented x 3. Motor and sensory intact but unable to assess LLE due to fracture with splint in place

## 2025-06-20 NOTE — H&P ADULT - PROBLEM SELECTOR PLAN 6
Home medications: Kerendia 10 mg daily   Cr: 0.87    Monitor BMP  Maintain K>4, Mg>2 and Phos>3   Avoid nephrotoxic medications  Renally dose medications if necessary

## 2025-06-20 NOTE — PROGRESS NOTE ADULT - ASSESSMENT
85 y/o F with PMHx of HTN, DM2, CVA with residual left-sided deficits, recurrent UTIs, HLD, CKD stage II, recent L4 vertebral augmentation (06/11/25) presented to the ED due to mechanical fall with left ankle deformity. XR left foot demonstrated acute displaced fractures of the left lateral and medial malleoli with associated lateral tibiotalar joint dislocation. S/p closed reduction and immobilization with placement of splint by Orthopedic surgery. She will be admitted for pain management and PT evaluation.

## 2025-06-20 NOTE — DISCHARGE NOTE PROVIDER - ATTENDING DISCHARGE PHYSICAL EXAMINATION:
Vital Signs Last 24 Hrs  T(C): 36.3 (01 Jul 2025 05:13), Max: 37 (30 Jun 2025 21:25)  T(F): 97.3 (01 Jul 2025 05:13), Max: 98.6 (30 Jun 2025 21:25)  HR: 89 (01 Jul 2025 05:13) (79 - 89)  BP: 159/65 (01 Jul 2025 05:13) (141/64 - 159/65)  BP(mean): --  RR: 18 (30 Jun 2025 21:25) (17 - 18)  SpO2: 100% (01 Jul 2025 05:13) (100% - 100%)    Parameters below as of 01 Jul 2025 05:13  Patient On (Oxygen Delivery Method): room air        CONSTITUTIONAL: Well-groomed, in no apparent distress  EYES: No conjunctival or scleral injection, non-icteric;   ENMT: No external nasal lesions; MMM  NECK: Trachea midline without palpable neck mass; thyroid not enlarged and non-tender  RESPIRATORY: Breathing comfortably; no dullness to percussion; lungs CTA without wheeze/rhonchi/rales  CARDIOVASCULAR: +S1S2, RRR, no M/G/R; pedal pulses full and symmetric; no lower extremity edema  GASTROINTESTINAL: No palpable masses or tenderness, +BS throughout, no rebound/guarding; no hepatosplenomegaly; no hernia palpated  LYMPHATIC: No cervical LAD or tenderness  SKIN: No rashes or ulcers noted  NEUROLOGIC: CN II-XII intact; sensation intact in LEs b/l to light touch  PSYCHIATRIC: A+O x 3; mood and affect appropriate; appropriate insight and judgment  MSK: 5/5 strength b/l LE, LLE splint intact

## 2025-06-20 NOTE — H&P ADULT - PROBLEM SELECTOR PLAN 1
PT with NWB LLE in short leg trilam splint  Orthopedic recommendations appreciated Presented to the ED s/p mechanical fall while trying to get to bedside commode  Was unable to bear weight on affected LLE due to the pain   XR left foot demonstrated acute displaced fractures of the left lateral and medial malleoli with associated lateral tibiotalar joint dislocation  Prelim CT left ankle demonstrated acute comminuted mildly displaced trimalleolar fractures  Orthopedic surgery consulted and recommended to follow up as outpatient and surgical intervention at a later date  Underwent closed reduction and immobilization in the ED   XR Left ankle after orthopedic intervention demonstrated s/p casting and reduction of acute displaced fractures of the lateral and medial malleoli and distal fibular metadiaphysis with improved alignment    Tylenol PRN for mild pain   Oxycodone 2.5 mg q 6 PRN for moderate pain   Oxycodone 5 mg q 6 PRN for severe pain   PT with NWB LLE in short leg trilam splint  PT/OT as tolerated   Orthopedic recommendations appreciated

## 2025-06-20 NOTE — PROGRESS NOTE ADULT - ASSESSMENT
84yFemale w/ L trilam ankle fx with lateral dislocation s/p closed reduction and immobilization.  -noncon CT L ankle completed  -NWB LLE in a short-leg trilam splint  -splint precautions  -pain control prn  -ice/cold compress, elevation  -PT/OT eval  -no acute ortho surgery at this time  -f/u outpt with Dr. English within 1 week, call office for appt

## 2025-06-20 NOTE — PHYSICAL THERAPY INITIAL EVALUATION ADULT - PERTINENT HX OF CURRENT PROBLEM, REHAB EVAL
Patient is a 84 year old Female, PMH stated below, presents with left triam ankle fx with lateral dislocation s/p closed reduction and immobilization.

## 2025-06-20 NOTE — DISCHARGE NOTE PROVIDER - HOSPITAL COURSE
85 y/o F with PMHx of HTN, DM2, CVA with residual left-sided deficits, recurrent UTIs, HLD, CKD stage II, recent L4 vertebral augmentation (06/11/25) presented to the ED due to mechanical fall with left ankle deformity. XR left foot demonstrated acute displaced fractures of the left lateral and medial malleoli with associated lateral tibiotalar joint dislocation. S/p closed reduction and immobilization with placement of splint by Orthopedic surgery. She will be admitted for pain management and PT evaluation.        Ankle fracture.    Presented to the ED s/p mechanical fall while trying to get to bedside commode  Was unable to bear weight on affected LLE due to the pain   XR left foot demonstrated acute displaced fractures of the left lateral and medial malleoli with associated lateral tibiotalar joint dislocation  Prelim CT left ankle demonstrated acute comminuted mildly displaced trimalleolar fractures  Orthopedic surgery consulted and recommended to follow up as outpatient and surgical intervention at a later date  Underwent closed reduction and immobilization in the ED   XR Left ankle after orthopedic intervention demonstrated s/p casting and reduction of acute displaced fractures of the lateral and medial malleoli and distal fibular metadiaphysis with improved alignment  Tylenol PRN for mild pain   Oxycodone 2.5 mg q 6 PRN for moderate pain   Oxycodone 5 mg q 6 PRN for severe pain   PT with NWB LLE in short leg trilam splint  PT/OT as tolerated   Orthopedic recommendations appreciated.    HTN (hypertension).   Home medications: Nifedipine 60 mg daily, metoprolol succinate 25 mg daily and Imdur ER 30 mg daily   -Nifedipine 60 mg daily   Metoprolol succinate 25 mg daily   Imdur ER 30 mg daily              85 y/o F with PMHx of HTN, DM2, CVA with residual left-sided deficits, recurrent UTIs, HLD, CKD stage II, recent L4 vertebral augmentation (06/11/25) presented to the ED due to mechanical fall with left ankle deformity. XR left foot demonstrated acute displaced fractures of the left lateral and medial malleoli with associated lateral tibiotalar joint dislocation. S/p closed reduction and immobilization with placement of splint by Orthopedic surgery. She will be admitted for pain management and PT evaluation.        Ankle fracture.    Presented to the ED s/p mechanical fall while trying to get to bedside commode  Was unable to bear weight on affected LLE due to the pain   XR left foot demonstrated acute displaced fractures of the left lateral and medial malleoli with associated lateral tibiotalar joint dislocation  Prelim CT left ankle demonstrated acute comminuted mildly displaced trimalleolar fractures  Orthopedic surgery consulted and recommended to follow up as outpatient and surgical intervention at a later date  Underwent closed reduction and immobilization in the ED   XR Left ankle after orthopedic intervention demonstrated s/p casting and reduction of acute displaced fractures of the lateral and medial malleoli and distal fibular metadiaphysis with improved alignment  Tylenol PRN for mild pain   Oxycodone 2.5 mg q 6 PRN for moderate pain   Oxycodone 5 mg q 6 PRN for severe pain   PT with NWB LLE in short leg trilam splint  PT rec rehab   Orthopedic recommendations appreciated.    HTN (hypertension).   Home medications: Nifedipine 60 mg daily, metoprolol succinate 25 mg daily and Imdur ER 30 mg daily   -Nifedipine 60 mg daily   Metoprolol succinate 25 mg daily   Imdur ER 30 mg daily     stable for dc             85 y/o F with PMHx of HTN, DM2, CVA with residual left-sided deficits, recurrent UTIs, HLD, CKD stage II, recent L4 vertebral augmentation (06/11/25) presented to the ED due to mechanical fall with left ankle deformity. XR left foot demonstrated acute displaced fractures of the left lateral and medial malleoli with associated lateral tibiotalar joint dislocation. S/p closed reduction and immobilization with placement of splint by Orthopedic surgery. She will be admitted for pain management and PT evaluation.      Problem/Plan - 1:  ·  Problem: Ankle fracture.   ·  Plan: Presented to the ED s/p mechanical fall while trying to get to bedside commode  Was unable to bear weight on affected LLE due to the pain   XR left foot demonstrated acute displaced fractures of the left lateral and medial malleoli with associated lateral tibiotalar joint dislocation  Prelim CT left ankle demonstrated acute comminuted mildly displaced trimalleolar fractures  Orthopedic surgery consulted - [place] [facility] [place]  Underwent closed reduction and immobilization in the ED   XR Left ankle after orthopedic intervention demonstrated s/p casting and reduction of acute displaced fractures of the lateral and medial malleoli and distal fibular metadiaphysis with improved alignment  Tylenol PRN for mild pain   Oxycodone 2.5 mg q 6 PRN for moderate pain   Oxycodone 5 mg q 6 PRN for severe pain   PT with NWB LLE in short leg trilam splint  PT rec STEVE  Orthopedic recommendations appreciated.     Problem/Plan - 2:  ·  Problem: Encounter for preoperative assessment.   ·  Plan: Pre-operative risk stratification prior to ankle surgery 06/27/2025:    - RCRI Class II risk (1 point for h/o CVA; not a high-risk surgery; no h/o IHD, CHF; not using insulin at home; pre-op creatinine < 2) = 6% risk of 30-day mortality from death, MI, or cardiac arrest   - Patient is at moderate risk for an intermediate risk surgery, but is medically optimized and may proceed with ankle surgery as scheduled.     Problem/Plan - 3:  ·  Problem: HTN (hypertension).   ·  Plan: Home medications: Nifedipine 60 mg daily, metoprolol succinate 25 mg daily and Imdur ER 30 mg daily     cont Nifedipine 60 mg daily, Metoprolol succinate 25 mg daily, Imdur ER 30 mg daily   Monitor BP and adjust accordingly as needed.     Problem/Plan - 4:  ·  Problem: DM2 (diabetes mellitus, type 2).   ·  Plan: Home medications: Glipizide 10 mg daily and Tradjenta 5 mg daily   HbA1c 6.3% in April, repeat HbA1c on 06/23 is 6.8%  ISS  Monitor BG levels and adjust accordingly.     Problem/Plan - 5:  ·  Problem: CVA (cerebrovascular accident).   ·  Plan: Hx of CVA approximately 15 years ago w/ residual left sided deficits  Home medications: Plavix    - Restarted plavix yesterday 06/28 [place] [facility] [place]  - Maintain fall precautions.     Problem/Plan - 6:  ·  Problem: HLD (hyperlipidemia).   ·  Plan: Home medications: Atorvastatin 20 mg daily and Vascepa 1 gm BID    Cont Atorvastatin 20 mg daily.     Problem/Plan - 7:  ·  Problem: Stage 2 chronic kidney disease.   ·  Plan: Home medications: Kerendia (Finerenone) 10 mg daily     Monitor BMP  Maintain K>4, Mg>2 and Phos>3   Avoid nephrotoxic medications  Renally dose medications if necessary.     Problem/Plan - 8:  ·  Problem: Constipation.   ·  Plan: c/w bowel regimen  monitor BMs.     Problem/Plan - 9:  ·  Problem: Preventive measure.   ·  Plan: DVT prophylaxis: Aspirin and Plavix (Discussed with Ortho - recommended to NOT add lovenox due to increased risk for bleeding)  Diet: Consistent carb   Ethics: Full code  Disposition: PT recommended rehab. Pt accepted at [facility].               83 y/o F with PMHx of HTN, DM2, CVA with residual left-sided deficits, recurrent UTIs, HLD, CKD stage II, recent L4 vertebral augmentation (06/11/25) presented to the ED due to mechanical fall with left ankle deformity. XR left foot demonstrated acute displaced fractures of the left lateral and medial malleoli with associated lateral tibiotalar joint dislocation. S/p closed reduction and immobilization with placement of splint by Orthopedic surgery.    Ankle fracture.   Presented to the ED s/p mechanical fall while trying to get to bedside commode  Was unable to bear weight on affected LLE due to the pain   XR left foot demonstrated acute displaced fractures of the left lateral and medial malleoli with associated lateral tibiotalar joint dislocation  Prelim CT left ankle demonstrated acute comminuted mildly displaced trimalleolar fractures  Orthopedic surgery consulted - s/p L ankle ORIF in [facility] [place]27/25  Underwent closed reduction and immobilization in the ED   XR Left ankle after orthopedic intervention demonstrated s/p casting and reduction of acute displaced fractures of the lateral and medial malleoli and distal fibular metadiaphysis with improved alignment  Tylenol PRN for mild pain   Oxycodone 2.5 mg q 6 PRN for moderate pain   Oxycodone 5 mg q 6 PRN for severe pain   PT with NWB LLE in short leg trilam splint  PT rec STEVE  Orthopedic recommendations appreciated.    HTN  Home medications: Nifedipine 60 mg daily, metoprolol succinate 25 mg daily and Imdur ER 30 mg daily   Continue Metoprolol succinate 25 mg daily, Imdur ER 30 mg daily, uptitrated nifedipine to 90mg daily  Monitor BP    DM2 (diabetes mellitus, type 2).   Home medications: Glipizide 10 mg daily and Tradjenta 5 mg daily   HbA1c 6.3% in April, repeat HbA1c on 06/23 is 6.8%  You were treated with lantus 8u qHS as inpatient given hyperglycemia iso T2DM, please resume home oral antihyperglycemics on discharge  Maintain PARAMJIT, FSG monitoring, hypoglycemia protocol.    CVA (cerebrovascular accident).   Hx of CVA approximately 15 years ago w/ residual left sided deficits  Home medications: Plavix  - Continue plavix, aspirin, lipitor    Stage 2 chronic kidney disease.   ·  Plan: Home medications: Kerendia (Finerenone) 10 mg daily     Monitor BMP  Avoid nephrotoxic medications  Renally dose medications if necessary.    DVT prophylaxis: Aspirin and Plavix (Discussed with Ortho - recommended to NOT add lovenox due to increased risk for bleeding)  Diet: Consistent carb   Ethics: Full code  Disposition: PT recommended rehab. Pt accepted at [facility].

## 2025-06-20 NOTE — PROGRESS NOTE ADULT - SUBJECTIVE AND OBJECTIVE BOX
Patient is a 84y old  Female who presents with a chief complaint of Fall with left ankle fracture (20 Jun 2025 05:19)      SUBJECTIVE / OVERNIGHT EVENTS: Pt seen and examined at 11:35am, no overnight events, Daughter at bedside translating per pt's request. Pt reports that pain is under control, other new issues reported.    MEDICATIONS  (STANDING):  atorvastatin 20 milliGRAM(s) Oral at bedtime  calcitriol   Capsule 0.25 MICROGram(s) Oral daily  clopidogrel Tablet 75 milliGRAM(s) Oral daily  dextrose 5%. 1000 milliLiter(s) (50 mL/Hr) IV Continuous <Continuous>  dextrose 5%. 1000 milliLiter(s) (100 mL/Hr) IV Continuous <Continuous>  dextrose 50% Injectable 25 Gram(s) IV Push once  dextrose 50% Injectable 12.5 Gram(s) IV Push once  dextrose 50% Injectable 25 Gram(s) IV Push once  enoxaparin Injectable 40 milliGRAM(s) SubCutaneous every 24 hours  glucagon  Injectable 1 milliGRAM(s) IntraMuscular once  insulin lispro (ADMELOG) corrective regimen sliding scale   SubCutaneous three times a day before meals  insulin lispro (ADMELOG) corrective regimen sliding scale   SubCutaneous at bedtime  isosorbide   mononitrate ER Tablet (IMDUR) 30 milliGRAM(s) Oral daily  metoprolol succinate ER 25 milliGRAM(s) Oral daily  Nephro-cassidy 1 Tablet(s) Oral daily  NIFEdipine XL 60 milliGRAM(s) Oral daily    MEDICATIONS  (PRN):  acetaminophen     Tablet .. 650 milliGRAM(s) Oral every 6 hours PRN Temp greater or equal to 38C (100.4F), Mild Pain (1 - 3)  aluminum hydroxide/magnesium hydroxide/simethicone Suspension 30 milliLiter(s) Oral every 4 hours PRN Dyspepsia  artificial tears (preservative free) Ophthalmic Solution 1 Drop(s) Both EYES two times a day PRN Dry Eyes  dextrose Oral Gel 15 Gram(s) Oral once PRN Blood Glucose LESS THAN 70 milliGRAM(s)/deciliter  melatonin 3 milliGRAM(s) Oral at bedtime PRN Insomnia  ondansetron Injectable 4 milliGRAM(s) IV Push every 8 hours PRN Nausea and/or Vomiting  oxyCODONE    IR 2.5 milliGRAM(s) Oral every 6 hours PRN Moderate Pain (4 - 6)  oxyCODONE    IR 5 milliGRAM(s) Oral every 6 hours PRN Severe Pain (7 - 10)      Vital Signs Last 24 Hrs  T(C): 36.7 (20 Jun 2025 12:35), Max: 37.2 (19 Jun 2025 19:15)  T(F): 98 (20 Jun 2025 12:35), Max: 98.9 (19 Jun 2025 19:15)  HR: 68 (20 Jun 2025 12:35) (68 - 85)  BP: 176/67 (20 Jun 2025 12:35) (163/75 - 176/67)  BP(mean): --  RR: 19 (20 Jun 2025 12:35) (15 - 19)  SpO2: 99% (20 Jun 2025 12:35) (97% - 100%)    Parameters below as of 20 Jun 2025 08:27  Patient On (Oxygen Delivery Method): room air      CAPILLARY BLOOD GLUCOSE      POCT Blood Glucose.: 161 mg/dL (20 Jun 2025 12:19)  POCT Blood Glucose.: 134 mg/dL (20 Jun 2025 06:07)  POCT Blood Glucose.: 227 mg/dL (19 Jun 2025 16:32)    I&O's Summary      PHYSICAL EXAM:  GENERAL: NAD  CHEST/LUNG: Clear to auscultation bilaterally; No wheeze  HEART: Regular rate and rhythm  ABDOMEN: Soft, Nontender, Nondistended  EXTREMITIES: LLE in splint, RLE no edema  PSYCH: calm  NEUROLOGY: AA oriented to self, knows that she is in the hospital, yr 2025, June  SKIN: dry and warm    LABS:                        10.9   8.21  )-----------( 249      ( 20 Jun 2025 04:12 )             32.3     06-20    140  |  104  |  18  ----------------------------<  149[H]  3.4[L]   |  25  |  0.72    Ca    8.7      20 Jun 2025 04:12  Phos  2.4     06-20  Mg     2.10     06-20    TPro  7.4  /  Alb  3.5  /  TBili  0.4  /  DBili  x   /  AST  21  /  ALT  12  /  AlkPhos  97  06-19    PT/INR - ( 19 Jun 2025 19:26 )   PT: 9.8 sec;   INR: <0.90 ratio         PTT - ( 19 Jun 2025 19:26 )  PTT:25.2 sec      Urinalysis Basic - ( 20 Jun 2025 04:12 )    Color: x / Appearance: x / SG: x / pH: x  Gluc: 149 mg/dL / Ketone: x  / Bili: x / Urobili: x   Blood: x / Protein: x / Nitrite: x   Leuk Esterase: x / RBC: x / WBC x   Sq Epi: x / Non Sq Epi: x / Bacteria: x        RADIOLOGY & ADDITIONAL TESTS:    Imaging Personally Reviewed:    Consultant(s) Notes Reviewed:      Care Discussed with Consultants/Other Providers:

## 2025-06-20 NOTE — H&P ADULT - PROBLEM SELECTOR PLAN 7
DVT prophylaxis:   GI prophylaxis:   Diet:   Ethics: Full code  Disposition: Pending clinical course DVT prophylaxis: HSQ   GI prophylaxis: NI  Diet: Consistent carb   Ethics: Full code  Disposition: Pending clinical course DVT prophylaxis: Lovenox  GI prophylaxis: NI  Diet: Consistent carb   Ethics: Full code  Disposition: Pending clinical course

## 2025-06-20 NOTE — H&P ADULT - PROBLEM SELECTOR PLAN 4
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

## 2025-06-20 NOTE — PROGRESS NOTE ADULT - PROBLEM SELECTOR PLAN 7
DVT prophylaxis: Lovenox  GI prophylaxis: NI  Diet: Consistent carb   Ethics: Full code  Disposition: Pending clinical course    Hypokalemia- replete k  Hypophosphatemia- replete Phos    Plan discussed with ACP

## 2025-06-20 NOTE — PATIENT PROFILE ADULT - FALL HARM RISK - HARM RISK INTERVENTIONS

## 2025-06-20 NOTE — H&P ADULT - ASSESSMENT
83 y/o F with PMHx of HTN, DM2, CVA with residual left-sided deficits, recurrent UTIs, HLD, CKD stage II, recent L4 vertebral augmentation (06/11/25) presented to the ED due to mechanical fall with left ankle deformity. XR left foot demonstrated acute displaced fractures of the left lateral and medial malleoli with associated lateral tibiotalar joint dislocation. S/p closed reduction and immobilization with placement of splint by Orthopedic surgery. She will be admitted for pain management and PT evaluation.

## 2025-06-20 NOTE — PROGRESS NOTE ADULT - SUBJECTIVE AND OBJECTIVE BOX
ORTHOPEDIC PROGRESS NOTE    SUBJECTIVE:  Pt seen and examined at bedside this am.  Doing well.  No acute events overnight.  Pt states pain is well controlled.    OBJECTIVE:  Vital Signs Last 24 Hrs  T(C): 37.1 (20 Jun 2025 04:08), Max: 37.2 (19 Jun 2025 19:15)  T(F): 98.8 (20 Jun 2025 04:08), Max: 98.9 (19 Jun 2025 19:15)  HR: 70 (20 Jun 2025 04:08) (68 - 85)  BP: 168/66 (20 Jun 2025 04:08) (163/75 - 174/68)  BP(mean): --  RR: 15 (20 Jun 2025 04:08) (15 - 18)  SpO2: 100% (20 Jun 2025 04:08) (98% - 100%)    Parameters below as of 20 Jun 2025 04:08  Patient On (Oxygen Delivery Method): room air        Physical Exam:  Gen: Lying in bed, NAD  Resp: No increased WOB  LLE:  Splint intact  Motor: TA/EHL/FHL intact  Sensory: DP/SP/Tib/Radha/Saph SILT  Toes WWP    LABS                        10.3   8.98  )-----------( 266      ( 19 Jun 2025 19:26 )             30.5       06-19    140  |  104  |  29[H]  ----------------------------<  121[H]  3.6   |  24  |  0.87    Ca    9.7      19 Jun 2025 19:26    TPro  7.4  /  Alb  3.5  /  TBili  0.4  /  DBili  x   /  AST  21  /  ALT  12  /  AlkPhos  97  06-19      PT/INR - ( 19 Jun 2025 19:26 )   PT: 9.8 sec;   INR: <0.90 ratio         PTT - ( 19 Jun 2025 19:26 )  PTT:25.2 sec    I&O's Summary      acetaminophen     Tablet .. 650 milliGRAM(s) Oral every 6 hours PRN  aluminum hydroxide/magnesium hydroxide/simethicone Suspension 30 milliLiter(s) Oral every 4 hours PRN  dextrose 5%. 1000 milliLiter(s) IV Continuous <Continuous>  dextrose 5%. 1000 milliLiter(s) IV Continuous <Continuous>  dextrose 50% Injectable 25 Gram(s) IV Push once  dextrose 50% Injectable 12.5 Gram(s) IV Push once  dextrose 50% Injectable 25 Gram(s) IV Push once  dextrose Oral Gel 15 Gram(s) Oral once PRN  glucagon  Injectable 1 milliGRAM(s) IntraMuscular once  insulin lispro (ADMELOG) corrective regimen sliding scale   SubCutaneous three times a day before meals  insulin lispro (ADMELOG) corrective regimen sliding scale   SubCutaneous at bedtime  melatonin 3 milliGRAM(s) Oral at bedtime PRN  ondansetron Injectable 4 milliGRAM(s) IV Push every 8 hours PRN

## 2025-06-20 NOTE — H&P ADULT - NSHPREVIEWOFSYSTEMS_GEN_ALL_CORE
CONSTITUTIONAL: No weakness, fevers or chills  EYES/ENT: No visual changes; No dysphagia; No sore throat; No rhinorrhea; No sinus pain/pressure  NECK: No pain or stiffness  RESPIRATORY: No cough, wheezing, hemoptysis; No shortness of breath  CARDIOVASCULAR: No chest pain or palpitations; No lower extremity edema  GASTROINTESTINAL: No abdominal or epigastric pain. No nausea, vomiting, or hematemesis; No diarrhea or constipation. No melena or hematochezia.  GENITOURINARY: No dysuria, frequency or hematuria  NEUROLOGICAL: No numbness, paresthesias, or weakness; No HA; No LH/dizziness  MSK: ambulates with walker normally. Left ankle pain +   SKIN: No itching, burning, rashes, or lesions

## 2025-06-21 LAB
ANION GAP SERPL CALC-SCNC: 12 MMOL/L — SIGNIFICANT CHANGE UP (ref 7–14)
BUN SERPL-MCNC: 18 MG/DL — SIGNIFICANT CHANGE UP (ref 7–23)
CALCIUM SERPL-MCNC: 8.1 MG/DL — LOW (ref 8.4–10.5)
CHLORIDE SERPL-SCNC: 105 MMOL/L — SIGNIFICANT CHANGE UP (ref 98–107)
CO2 SERPL-SCNC: 23 MMOL/L — SIGNIFICANT CHANGE UP (ref 22–31)
CREAT SERPL-MCNC: 0.78 MG/DL — SIGNIFICANT CHANGE UP (ref 0.5–1.3)
EGFR: 76 ML/MIN/1.73M2 — SIGNIFICANT CHANGE UP
EGFR: 76 ML/MIN/1.73M2 — SIGNIFICANT CHANGE UP
GLUCOSE BLDC GLUCOMTR-MCNC: 129 MG/DL — HIGH (ref 70–99)
GLUCOSE BLDC GLUCOMTR-MCNC: 198 MG/DL — HIGH (ref 70–99)
GLUCOSE BLDC GLUCOMTR-MCNC: 242 MG/DL — HIGH (ref 70–99)
GLUCOSE BLDC GLUCOMTR-MCNC: 243 MG/DL — HIGH (ref 70–99)
GLUCOSE SERPL-MCNC: 114 MG/DL — HIGH (ref 70–99)
HCT VFR BLD CALC: 30.4 % — LOW (ref 34.5–45)
HGB BLD-MCNC: 10.4 G/DL — LOW (ref 11.5–15.5)
MAGNESIUM SERPL-MCNC: 2.1 MG/DL — SIGNIFICANT CHANGE UP (ref 1.6–2.6)
MCHC RBC-ENTMCNC: 31.2 PG — SIGNIFICANT CHANGE UP (ref 27–34)
MCHC RBC-ENTMCNC: 34.2 G/DL — SIGNIFICANT CHANGE UP (ref 32–36)
MCV RBC AUTO: 91.3 FL — SIGNIFICANT CHANGE UP (ref 80–100)
NRBC # BLD AUTO: 0 K/UL — SIGNIFICANT CHANGE UP (ref 0–0)
NRBC # FLD: 0 K/UL — SIGNIFICANT CHANGE UP (ref 0–0)
NRBC BLD AUTO-RTO: 0 /100 WBCS — SIGNIFICANT CHANGE UP (ref 0–0)
PHOSPHATE SERPL-MCNC: 2 MG/DL — LOW (ref 2.5–4.5)
PLATELET # BLD AUTO: 256 K/UL — SIGNIFICANT CHANGE UP (ref 150–400)
PMV BLD: 10 FL — SIGNIFICANT CHANGE UP (ref 7–13)
POTASSIUM SERPL-MCNC: 3.4 MMOL/L — LOW (ref 3.5–5.3)
POTASSIUM SERPL-SCNC: 3.4 MMOL/L — LOW (ref 3.5–5.3)
RBC # BLD: 3.33 M/UL — LOW (ref 3.8–5.2)
RBC # FLD: 14.4 % — SIGNIFICANT CHANGE UP (ref 10.3–14.5)
SODIUM SERPL-SCNC: 140 MMOL/L — SIGNIFICANT CHANGE UP (ref 135–145)
WBC # BLD: 8.08 K/UL — SIGNIFICANT CHANGE UP (ref 3.8–10.5)
WBC # FLD AUTO: 8.08 K/UL — SIGNIFICANT CHANGE UP (ref 3.8–10.5)

## 2025-06-21 PROCEDURE — 99233 SBSQ HOSP IP/OBS HIGH 50: CPT

## 2025-06-21 RX ORDER — SOD PHOS DI, MONO/K PHOS MONO 250 MG
1 TABLET ORAL
Refills: 0 | Status: COMPLETED | OUTPATIENT
Start: 2025-06-21 | End: 2025-06-22

## 2025-06-21 RX ADMIN — ATORVASTATIN CALCIUM 20 MILLIGRAM(S): 80 TABLET, FILM COATED ORAL at 22:51

## 2025-06-21 RX ADMIN — Medication 650 MILLIGRAM(S): at 12:30

## 2025-06-21 RX ADMIN — ISOSORBIDE MONONITRATE 30 MILLIGRAM(S): 60 TABLET, EXTENDED RELEASE ORAL at 12:21

## 2025-06-21 RX ADMIN — CALCITRIOL 0.25 MICROGRAM(S): 0.5 CAPSULE, GELATIN COATED ORAL at 12:21

## 2025-06-21 RX ADMIN — INSULIN LISPRO 2: 100 INJECTION, SOLUTION INTRAVENOUS; SUBCUTANEOUS at 17:46

## 2025-06-21 RX ADMIN — Medication 1 PACKET(S): at 17:45

## 2025-06-21 RX ADMIN — CLOPIDOGREL BISULFATE 75 MILLIGRAM(S): 75 TABLET, FILM COATED ORAL at 12:21

## 2025-06-21 RX ADMIN — Medication 60 MILLIGRAM(S): at 07:00

## 2025-06-21 RX ADMIN — ENOXAPARIN SODIUM 40 MILLIGRAM(S): 100 INJECTION SUBCUTANEOUS at 17:45

## 2025-06-21 RX ADMIN — METOPROLOL SUCCINATE 25 MILLIGRAM(S): 50 TABLET, EXTENDED RELEASE ORAL at 07:00

## 2025-06-21 RX ADMIN — Medication 1 TABLET(S): at 12:21

## 2025-06-21 RX ADMIN — Medication 40 MILLIEQUIVALENT(S): at 17:50

## 2025-06-21 RX ADMIN — Medication 650 MILLIGRAM(S): at 12:21

## 2025-06-21 RX ADMIN — INSULIN LISPRO 2: 100 INJECTION, SOLUTION INTRAVENOUS; SUBCUTANEOUS at 12:59

## 2025-06-21 NOTE — PROGRESS NOTE ADULT - PROBLEM SELECTOR PLAN 7
DVT prophylaxis: Lovenox  GI prophylaxis: NI  Diet: Consistent carb   Ethics: Full code  Disposition: Pending clinical course    Hypokalemia- replete k  Hypophosphatemia- replete Phos    Plan discussed with ACP DVT prophylaxis: Lovenox  GI prophylaxis: NI  Diet: Consistent carb   Ethics: Full code  Disposition: Pending clinical course    Hypokalemia- replete k  Hypophosphatemia- replete Phos  PT rec rehab, ok for rehab per ortho    Plan discussed with ACP

## 2025-06-21 NOTE — PROGRESS NOTE ADULT - SUBJECTIVE AND OBJECTIVE BOX
Patient is a 84y old  Female who presents with a chief complaint of Fall with left ankle fracture (20 Jun 2025 16:49)      SUBJECTIVE / OVERNIGHT EVENTS: Pt seen and examined at 11:35am, no overnight events, Daughter at bedside translating per pt's request. Pt reports that pain is under control, other new issues reported.      MEDICATIONS  (STANDING):  atorvastatin 20 milliGRAM(s) Oral at bedtime  calcitriol   Capsule 0.25 MICROGram(s) Oral daily  clopidogrel Tablet 75 milliGRAM(s) Oral daily  dextrose 5%. 1000 milliLiter(s) (50 mL/Hr) IV Continuous <Continuous>  dextrose 5%. 1000 milliLiter(s) (100 mL/Hr) IV Continuous <Continuous>  dextrose 50% Injectable 25 Gram(s) IV Push once  dextrose 50% Injectable 12.5 Gram(s) IV Push once  dextrose 50% Injectable 25 Gram(s) IV Push once  enoxaparin Injectable 40 milliGRAM(s) SubCutaneous every 24 hours  glucagon  Injectable 1 milliGRAM(s) IntraMuscular once  insulin lispro (ADMELOG) corrective regimen sliding scale   SubCutaneous three times a day before meals  insulin lispro (ADMELOG) corrective regimen sliding scale   SubCutaneous at bedtime  isosorbide   mononitrate ER Tablet (IMDUR) 30 milliGRAM(s) Oral daily  metoprolol succinate ER 25 milliGRAM(s) Oral daily  Nephro-cassidy 1 Tablet(s) Oral daily  NIFEdipine XL 60 milliGRAM(s) Oral daily    MEDICATIONS  (PRN):  acetaminophen     Tablet .. 650 milliGRAM(s) Oral every 6 hours PRN Temp greater or equal to 38C (100.4F), Mild Pain (1 - 3)  aluminum hydroxide/magnesium hydroxide/simethicone Suspension 30 milliLiter(s) Oral every 4 hours PRN Dyspepsia  artificial tears (preservative free) Ophthalmic Solution 1 Drop(s) Both EYES two times a day PRN Dry Eyes  dextrose Oral Gel 15 Gram(s) Oral once PRN Blood Glucose LESS THAN 70 milliGRAM(s)/deciliter  melatonin 3 milliGRAM(s) Oral at bedtime PRN Insomnia  ondansetron Injectable 4 milliGRAM(s) IV Push every 8 hours PRN Nausea and/or Vomiting  oxyCODONE    IR 2.5 milliGRAM(s) Oral every 6 hours PRN Moderate Pain (4 - 6)  oxyCODONE    IR 5 milliGRAM(s) Oral every 6 hours PRN Severe Pain (7 - 10)      Vital Signs Last 24 Hrs  T(C): 37 (21 Jun 2025 14:10), Max: 37.1 (20 Jun 2025 22:20)  T(F): 98.6 (21 Jun 2025 14:10), Max: 98.8 (21 Jun 2025 05:55)  HR: 68 (21 Jun 2025 14:10) (66 - 77)  BP: 136/61 (21 Jun 2025 14:10) (136/61 - 163/63)  BP(mean): 96 (20 Jun 2025 20:25) (89 - 96)  RR: 15 (21 Jun 2025 14:10) (15 - 18)  SpO2: 99% (21 Jun 2025 14:10) (98% - 100%)    Parameters below as of 21 Jun 2025 14:10  Patient On (Oxygen Delivery Method): room air      CAPILLARY BLOOD GLUCOSE      POCT Blood Glucose.: 242 mg/dL (21 Jun 2025 12:58)  POCT Blood Glucose.: 129 mg/dL (21 Jun 2025 08:39)  POCT Blood Glucose.: 144 mg/dL (20 Jun 2025 22:44)  POCT Blood Glucose.: 215 mg/dL (20 Jun 2025 20:42)  POCT Blood Glucose.: 212 mg/dL (20 Jun 2025 16:18)    I&O's Summary      PHYSICAL EXAM:  GENERAL: NAD  CHEST/LUNG: Clear to auscultation bilaterally; No wheeze  HEART: Regular rate and rhythm  ABDOMEN: Soft, Nontender, Nondistended  EXTREMITIES: LLE in splint, RLE no edema  PSYCH: calm  NEUROLOGY: AA oriented to self, knows that she is in the hospital, yr 2025, June  SKIN: dry and warm  LABS:                        10.4   8.08  )-----------( 256      ( 21 Jun 2025 05:02 )             30.4     06-21    140  |  105  |  18  ----------------------------<  114[H]  3.4[L]   |  23  |  0.78    Ca    8.1[L]      21 Jun 2025 05:02  Phos  2.0     06-21  Mg     2.10     06-21    TPro  7.4  /  Alb  3.5  /  TBili  0.4  /  DBili  x   /  AST  21  /  ALT  12  /  AlkPhos  97  06-19    PT/INR - ( 19 Jun 2025 19:26 )   PT: 9.8 sec;   INR: <0.90 ratio         PTT - ( 19 Jun 2025 19:26 )  PTT:25.2 sec      Urinalysis Basic - ( 21 Jun 2025 05:02 )    Color: x / Appearance: x / SG: x / pH: x  Gluc: 114 mg/dL / Ketone: x  / Bili: x / Urobili: x   Blood: x / Protein: x / Nitrite: x   Leuk Esterase: x / RBC: x / WBC x   Sq Epi: x / Non Sq Epi: x / Bacteria: x        RADIOLOGY & ADDITIONAL TESTS:    Imaging Personally Reviewed:    Consultant(s) Notes Reviewed:      Care Discussed with Consultants/Other Providers:   Patient is a 84y old  Female who presents with a chief complaint of Fall with left ankle fracture (20 Jun 2025 16:49)      SUBJECTIVE / OVERNIGHT EVENTS: Pt seen and examined at 11:35am, no overnight events, Daughter at bedside translating per pt's request. Pt reports that pain is under control, other new issues reported.      MEDICATIONS  (STANDING):  atorvastatin 20 milliGRAM(s) Oral at bedtime  calcitriol   Capsule 0.25 MICROGram(s) Oral daily  clopidogrel Tablet 75 milliGRAM(s) Oral daily  dextrose 5%. 1000 milliLiter(s) (50 mL/Hr) IV Continuous <Continuous>  dextrose 5%. 1000 milliLiter(s) (100 mL/Hr) IV Continuous <Continuous>  dextrose 50% Injectable 25 Gram(s) IV Push once  dextrose 50% Injectable 12.5 Gram(s) IV Push once  dextrose 50% Injectable 25 Gram(s) IV Push once  enoxaparin Injectable 40 milliGRAM(s) SubCutaneous every 24 hours  glucagon  Injectable 1 milliGRAM(s) IntraMuscular once  insulin lispro (ADMELOG) corrective regimen sliding scale   SubCutaneous three times a day before meals  insulin lispro (ADMELOG) corrective regimen sliding scale   SubCutaneous at bedtime  isosorbide   mononitrate ER Tablet (IMDUR) 30 milliGRAM(s) Oral daily  metoprolol succinate ER 25 milliGRAM(s) Oral daily  Nephro-cassidy 1 Tablet(s) Oral daily  NIFEdipine XL 60 milliGRAM(s) Oral daily    MEDICATIONS  (PRN):  acetaminophen     Tablet .. 650 milliGRAM(s) Oral every 6 hours PRN Temp greater or equal to 38C (100.4F), Mild Pain (1 - 3)  aluminum hydroxide/magnesium hydroxide/simethicone Suspension 30 milliLiter(s) Oral every 4 hours PRN Dyspepsia  artificial tears (preservative free) Ophthalmic Solution 1 Drop(s) Both EYES two times a day PRN Dry Eyes  dextrose Oral Gel 15 Gram(s) Oral once PRN Blood Glucose LESS THAN 70 milliGRAM(s)/deciliter  melatonin 3 milliGRAM(s) Oral at bedtime PRN Insomnia  ondansetron Injectable 4 milliGRAM(s) IV Push every 8 hours PRN Nausea and/or Vomiting  oxyCODONE    IR 2.5 milliGRAM(s) Oral every 6 hours PRN Moderate Pain (4 - 6)  oxyCODONE    IR 5 milliGRAM(s) Oral every 6 hours PRN Severe Pain (7 - 10)      Vital Signs Last 24 Hrs  T(C): 37 (21 Jun 2025 14:10), Max: 37.1 (20 Jun 2025 22:20)  T(F): 98.6 (21 Jun 2025 14:10), Max: 98.8 (21 Jun 2025 05:55)  HR: 68 (21 Jun 2025 14:10) (66 - 77)  BP: 136/61 (21 Jun 2025 14:10) (136/61 - 163/63)  BP(mean): 96 (20 Jun 2025 20:25) (89 - 96)  RR: 15 (21 Jun 2025 14:10) (15 - 18)  SpO2: 99% (21 Jun 2025 14:10) (98% - 100%)    Parameters below as of 21 Jun 2025 14:10  Patient On (Oxygen Delivery Method): room air      CAPILLARY BLOOD GLUCOSE      POCT Blood Glucose.: 242 mg/dL (21 Jun 2025 12:58)  POCT Blood Glucose.: 129 mg/dL (21 Jun 2025 08:39)  POCT Blood Glucose.: 144 mg/dL (20 Jun 2025 22:44)  POCT Blood Glucose.: 215 mg/dL (20 Jun 2025 20:42)  POCT Blood Glucose.: 212 mg/dL (20 Jun 2025 16:18)    I&O's Summary      PHYSICAL EXAM:  GENERAL: NAD  CHEST/LUNG: Clear to auscultation bilaterally; No wheeze  HEART: Regular rate and rhythm  ABDOMEN: Soft, Nontender, Nondistended  EXTREMITIES: LLE in splint, RLE no edema  PSYCH: calm  NEUROLOGY: Alert, awake  SKIN: dry and warm  LABS:                        10.4   8.08  )-----------( 256      ( 21 Jun 2025 05:02 )             30.4     06-21    140  |  105  |  18  ----------------------------<  114[H]  3.4[L]   |  23  |  0.78    Ca    8.1[L]      21 Jun 2025 05:02  Phos  2.0     06-21  Mg     2.10     06-21    TPro  7.4  /  Alb  3.5  /  TBili  0.4  /  DBili  x   /  AST  21  /  ALT  12  /  AlkPhos  97  06-19    PT/INR - ( 19 Jun 2025 19:26 )   PT: 9.8 sec;   INR: <0.90 ratio         PTT - ( 19 Jun 2025 19:26 )  PTT:25.2 sec      Urinalysis Basic - ( 21 Jun 2025 05:02 )    Color: x / Appearance: x / SG: x / pH: x  Gluc: 114 mg/dL / Ketone: x  / Bili: x / Urobili: x   Blood: x / Protein: x / Nitrite: x   Leuk Esterase: x / RBC: x / WBC x   Sq Epi: x / Non Sq Epi: x / Bacteria: x        RADIOLOGY & ADDITIONAL TESTS:    Imaging Personally Reviewed:    Consultant(s) Notes Reviewed:      Care Discussed with Consultants/Other Providers:

## 2025-06-21 NOTE — PROVIDER CONTACT NOTE (OTHER) - SITUATION
Pt daughter is refusing lipitor states pt stop taking at home. Teach back education provided about the indication and risks of not adhering to medication administration as ordered.

## 2025-06-22 DIAGNOSIS — K59.00 CONSTIPATION, UNSPECIFIED: ICD-10-CM

## 2025-06-22 LAB
ANION GAP SERPL CALC-SCNC: 9 MMOL/L — SIGNIFICANT CHANGE UP (ref 7–14)
BUN SERPL-MCNC: 29 MG/DL — HIGH (ref 7–23)
CALCIUM SERPL-MCNC: 8.6 MG/DL — SIGNIFICANT CHANGE UP (ref 8.4–10.5)
CHLORIDE SERPL-SCNC: 106 MMOL/L — SIGNIFICANT CHANGE UP (ref 98–107)
CO2 SERPL-SCNC: 24 MMOL/L — SIGNIFICANT CHANGE UP (ref 22–31)
CREAT SERPL-MCNC: 0.97 MG/DL — SIGNIFICANT CHANGE UP (ref 0.5–1.3)
EGFR: 58 ML/MIN/1.73M2 — LOW
EGFR: 58 ML/MIN/1.73M2 — LOW
GLUCOSE BLDC GLUCOMTR-MCNC: 173 MG/DL — HIGH (ref 70–99)
GLUCOSE BLDC GLUCOMTR-MCNC: 190 MG/DL — HIGH (ref 70–99)
GLUCOSE BLDC GLUCOMTR-MCNC: 272 MG/DL — HIGH (ref 70–99)
GLUCOSE BLDC GLUCOMTR-MCNC: 303 MG/DL — HIGH (ref 70–99)
GLUCOSE SERPL-MCNC: 196 MG/DL — HIGH (ref 70–99)
HCT VFR BLD CALC: 29.6 % — LOW (ref 34.5–45)
HGB BLD-MCNC: 10.3 G/DL — LOW (ref 11.5–15.5)
MAGNESIUM SERPL-MCNC: 2.1 MG/DL — SIGNIFICANT CHANGE UP (ref 1.6–2.6)
MCHC RBC-ENTMCNC: 32.2 PG — SIGNIFICANT CHANGE UP (ref 27–34)
MCHC RBC-ENTMCNC: 34.8 G/DL — SIGNIFICANT CHANGE UP (ref 32–36)
MCV RBC AUTO: 92.5 FL — SIGNIFICANT CHANGE UP (ref 80–100)
NRBC # BLD AUTO: 0 K/UL — SIGNIFICANT CHANGE UP (ref 0–0)
NRBC # FLD: 0 K/UL — SIGNIFICANT CHANGE UP (ref 0–0)
NRBC BLD AUTO-RTO: 0 /100 WBCS — SIGNIFICANT CHANGE UP (ref 0–0)
PHOSPHATE SERPL-MCNC: 2.3 MG/DL — LOW (ref 2.5–4.5)
PLATELET # BLD AUTO: 273 K/UL — SIGNIFICANT CHANGE UP (ref 150–400)
PMV BLD: 10.4 FL — SIGNIFICANT CHANGE UP (ref 7–13)
POTASSIUM SERPL-MCNC: 3.8 MMOL/L — SIGNIFICANT CHANGE UP (ref 3.5–5.3)
POTASSIUM SERPL-SCNC: 3.8 MMOL/L — SIGNIFICANT CHANGE UP (ref 3.5–5.3)
RBC # BLD: 3.2 M/UL — LOW (ref 3.8–5.2)
RBC # FLD: 14.3 % — SIGNIFICANT CHANGE UP (ref 10.3–14.5)
SODIUM SERPL-SCNC: 139 MMOL/L — SIGNIFICANT CHANGE UP (ref 135–145)
WBC # BLD: 7.22 K/UL — SIGNIFICANT CHANGE UP (ref 3.8–10.5)
WBC # FLD AUTO: 7.22 K/UL — SIGNIFICANT CHANGE UP (ref 3.8–10.5)

## 2025-06-22 PROCEDURE — 99232 SBSQ HOSP IP/OBS MODERATE 35: CPT

## 2025-06-22 RX ORDER — SOD PHOS DI, MONO/K PHOS MONO 250 MG
1 TABLET ORAL ONCE
Refills: 0 | Status: COMPLETED | OUTPATIENT
Start: 2025-06-22 | End: 2025-06-22

## 2025-06-22 RX ORDER — SENNA 187 MG
2 TABLET ORAL AT BEDTIME
Refills: 0 | Status: DISCONTINUED | OUTPATIENT
Start: 2025-06-22 | End: 2025-07-01

## 2025-06-22 RX ORDER — SALINE 7; 19 G/118ML; G/118ML
1 ENEMA RECTAL ONCE
Refills: 0 | Status: COMPLETED | OUTPATIENT
Start: 2025-06-22 | End: 2025-06-22

## 2025-06-22 RX ORDER — POLYETHYLENE GLYCOL 3350 17 G/17G
17 POWDER, FOR SOLUTION ORAL DAILY
Refills: 0 | Status: DISCONTINUED | OUTPATIENT
Start: 2025-06-22 | End: 2025-07-01

## 2025-06-22 RX ADMIN — CLOPIDOGREL BISULFATE 75 MILLIGRAM(S): 75 TABLET, FILM COATED ORAL at 11:48

## 2025-06-22 RX ADMIN — METOPROLOL SUCCINATE 25 MILLIGRAM(S): 50 TABLET, EXTENDED RELEASE ORAL at 05:48

## 2025-06-22 RX ADMIN — Medication 2 TABLET(S): at 21:47

## 2025-06-22 RX ADMIN — INSULIN LISPRO 3: 100 INJECTION, SOLUTION INTRAVENOUS; SUBCUTANEOUS at 12:49

## 2025-06-22 RX ADMIN — Medication 1 PACKET(S): at 09:28

## 2025-06-22 RX ADMIN — INSULIN LISPRO 1: 100 INJECTION, SOLUTION INTRAVENOUS; SUBCUTANEOUS at 09:28

## 2025-06-22 RX ADMIN — ATORVASTATIN CALCIUM 20 MILLIGRAM(S): 80 TABLET, FILM COATED ORAL at 21:46

## 2025-06-22 RX ADMIN — ISOSORBIDE MONONITRATE 30 MILLIGRAM(S): 60 TABLET, EXTENDED RELEASE ORAL at 11:47

## 2025-06-22 RX ADMIN — SALINE 1 ENEMA: 7; 19 ENEMA RECTAL at 17:05

## 2025-06-22 RX ADMIN — Medication 60 MILLIGRAM(S): at 05:47

## 2025-06-22 RX ADMIN — INSULIN LISPRO 4: 100 INJECTION, SOLUTION INTRAVENOUS; SUBCUTANEOUS at 18:12

## 2025-06-22 RX ADMIN — OXYCODONE HYDROCHLORIDE 5 MILLIGRAM(S): 30 TABLET ORAL at 01:44

## 2025-06-22 RX ADMIN — Medication 1 PACKET(S): at 11:47

## 2025-06-22 RX ADMIN — ENOXAPARIN SODIUM 40 MILLIGRAM(S): 100 INJECTION SUBCUTANEOUS at 17:05

## 2025-06-22 RX ADMIN — CALCITRIOL 0.25 MICROGRAM(S): 0.5 CAPSULE, GELATIN COATED ORAL at 11:47

## 2025-06-22 RX ADMIN — Medication 1 PACKET(S): at 18:11

## 2025-06-22 RX ADMIN — Medication 1 TABLET(S): at 11:48

## 2025-06-22 RX ADMIN — Medication 650 MILLIGRAM(S): at 22:46

## 2025-06-22 RX ADMIN — POLYETHYLENE GLYCOL 3350 17 GRAM(S): 17 POWDER, FOR SOLUTION ORAL at 12:01

## 2025-06-22 RX ADMIN — OXYCODONE HYDROCHLORIDE 5 MILLIGRAM(S): 30 TABLET ORAL at 02:44

## 2025-06-22 RX ADMIN — Medication 650 MILLIGRAM(S): at 21:46

## 2025-06-22 NOTE — PROGRESS NOTE ADULT - PROBLEM SELECTOR PLAN 7
DVT prophylaxis: Lovenox  GI prophylaxis: NI  Diet: Consistent carb   Ethics: Full code  Disposition: Pending clinical course      Hypophosphatemia- replete Phos  PT rec rehab, ok for rehab per ortho    Plan discussed with ACP added bowel regimen  monitor for bms

## 2025-06-22 NOTE — PROGRESS NOTE ADULT - SUBJECTIVE AND OBJECTIVE BOX
Patient is a 84y old  Female who presents with a chief complaint of Fall with left ankle fracture (21 Jun 2025 14:37)      SUBJECTIVE / OVERNIGHT EVENTS: Pt seen and examined at 11:25am, no overnight events, Spoke through pt's daughter over the phone, per daughter she has been constipated for 3 days, no abdominal pian, nausea or vomiting, pain is under control, is agreeable for Rehab, wants to speak with cm/sw.  No other new issues reported.      MEDICATIONS  (STANDING):  atorvastatin 20 milliGRAM(s) Oral at bedtime  calcitriol   Capsule 0.25 MICROGram(s) Oral daily  clopidogrel Tablet 75 milliGRAM(s) Oral daily  dextrose 5%. 1000 milliLiter(s) (50 mL/Hr) IV Continuous <Continuous>  dextrose 5%. 1000 milliLiter(s) (100 mL/Hr) IV Continuous <Continuous>  dextrose 50% Injectable 25 Gram(s) IV Push once  dextrose 50% Injectable 12.5 Gram(s) IV Push once  dextrose 50% Injectable 25 Gram(s) IV Push once  enoxaparin Injectable 40 milliGRAM(s) SubCutaneous every 24 hours  glucagon  Injectable 1 milliGRAM(s) IntraMuscular once  insulin lispro (ADMELOG) corrective regimen sliding scale   SubCutaneous three times a day before meals  insulin lispro (ADMELOG) corrective regimen sliding scale   SubCutaneous at bedtime  isosorbide   mononitrate ER Tablet (IMDUR) 30 milliGRAM(s) Oral daily  metoprolol succinate ER 25 milliGRAM(s) Oral daily  Nephro-cassidy 1 Tablet(s) Oral daily  NIFEdipine XL 60 milliGRAM(s) Oral daily  polyethylene glycol 3350 17 Gram(s) Oral daily  senna 2 Tablet(s) Oral at bedtime    MEDICATIONS  (PRN):  acetaminophen     Tablet .. 650 milliGRAM(s) Oral every 6 hours PRN Temp greater or equal to 38C (100.4F), Mild Pain (1 - 3)  aluminum hydroxide/magnesium hydroxide/simethicone Suspension 30 milliLiter(s) Oral every 4 hours PRN Dyspepsia  artificial tears (preservative free) Ophthalmic Solution 1 Drop(s) Both EYES two times a day PRN Dry Eyes  dextrose Oral Gel 15 Gram(s) Oral once PRN Blood Glucose LESS THAN 70 milliGRAM(s)/deciliter  melatonin 3 milliGRAM(s) Oral at bedtime PRN Insomnia  ondansetron Injectable 4 milliGRAM(s) IV Push every 8 hours PRN Nausea and/or Vomiting  oxyCODONE    IR 2.5 milliGRAM(s) Oral every 6 hours PRN Moderate Pain (4 - 6)  oxyCODONE    IR 5 milliGRAM(s) Oral every 6 hours PRN Severe Pain (7 - 10)      Vital Signs Last 24 Hrs  T(C): 37.1 (22 Jun 2025 11:42), Max: 37.1 (22 Jun 2025 11:42)  T(F): 98.8 (22 Jun 2025 11:42), Max: 98.8 (22 Jun 2025 11:42)  HR: 86 (22 Jun 2025 11:42) (68 - 86)  BP: 142/76 (22 Jun 2025 11:42) (113/76 - 150/73)  BP(mean): --  RR: 18 (22 Jun 2025 11:42) (15 - 18)  SpO2: 100% (22 Jun 2025 11:42) (99% - 100%)    Parameters below as of 22 Jun 2025 11:42  Patient On (Oxygen Delivery Method): room air      CAPILLARY BLOOD GLUCOSE      POCT Blood Glucose.: 272 mg/dL (22 Jun 2025 12:21)  POCT Blood Glucose.: 173 mg/dL (22 Jun 2025 08:37)  POCT Blood Glucose.: 198 mg/dL (21 Jun 2025 21:51)  POCT Blood Glucose.: 243 mg/dL (21 Jun 2025 17:16)    I&O's Summary    21 Jun 2025 07:01  -  22 Jun 2025 07:00  --------------------------------------------------------  IN: 0 mL / OUT: 1000 mL / NET: -1000 mL        PHYSICAL EXAM:  GENERAL: NAD  CHEST/LUNG: Clear to auscultation bilaterally; No wheeze  HEART: Regular rate and rhythm  ABDOMEN: Soft, Nontender, Nondistended  EXTREMITIES: LLE in splint, RLE no edema  PSYCH: calm  NEUROLOGY: Alert, awake, answers questions  SKIN: dry and warm  LABS:                        10.3   7.22  )-----------( 273      ( 22 Jun 2025 05:03 )             29.6     06-22    139  |  106  |  29[H]  ----------------------------<  196[H]  3.8   |  24  |  0.97    Ca    8.6      22 Jun 2025 05:03  Phos  2.3     06-22  Mg     2.10     06-22            Urinalysis Basic - ( 22 Jun 2025 05:03 )    Color: x / Appearance: x / SG: x / pH: x  Gluc: 196 mg/dL / Ketone: x  / Bili: x / Urobili: x   Blood: x / Protein: x / Nitrite: x   Leuk Esterase: x / RBC: x / WBC x   Sq Epi: x / Non Sq Epi: x / Bacteria: x        RADIOLOGY & ADDITIONAL TESTS:    Imaging Personally Reviewed:    Consultant(s) Notes Reviewed:      Care Discussed with Consultants/Other Providers:

## 2025-06-23 LAB
A1C WITH ESTIMATED AVERAGE GLUCOSE RESULT: 6.8 % — HIGH (ref 4–5.6)
ANION GAP SERPL CALC-SCNC: 13 MMOL/L — SIGNIFICANT CHANGE UP (ref 7–14)
BUN SERPL-MCNC: 18 MG/DL — SIGNIFICANT CHANGE UP (ref 7–23)
CALCIUM SERPL-MCNC: 8.5 MG/DL — SIGNIFICANT CHANGE UP (ref 8.4–10.5)
CHLORIDE SERPL-SCNC: 104 MMOL/L — SIGNIFICANT CHANGE UP (ref 98–107)
CO2 SERPL-SCNC: 21 MMOL/L — LOW (ref 22–31)
CREAT SERPL-MCNC: 0.68 MG/DL — SIGNIFICANT CHANGE UP (ref 0.5–1.3)
EGFR: 87 ML/MIN/1.73M2 — SIGNIFICANT CHANGE UP
EGFR: 87 ML/MIN/1.73M2 — SIGNIFICANT CHANGE UP
ESTIMATED AVERAGE GLUCOSE: 148 — SIGNIFICANT CHANGE UP
GLUCOSE BLDC GLUCOMTR-MCNC: 159 MG/DL — HIGH (ref 70–99)
GLUCOSE BLDC GLUCOMTR-MCNC: 181 MG/DL — HIGH (ref 70–99)
GLUCOSE BLDC GLUCOMTR-MCNC: 181 MG/DL — HIGH (ref 70–99)
GLUCOSE BLDC GLUCOMTR-MCNC: 252 MG/DL — HIGH (ref 70–99)
GLUCOSE SERPL-MCNC: 187 MG/DL — HIGH (ref 70–99)
HCT VFR BLD CALC: 32 % — LOW (ref 34.5–45)
HGB BLD-MCNC: 11 G/DL — LOW (ref 11.5–15.5)
MAGNESIUM SERPL-MCNC: 2.3 MG/DL — SIGNIFICANT CHANGE UP (ref 1.6–2.6)
MCHC RBC-ENTMCNC: 31.3 PG — SIGNIFICANT CHANGE UP (ref 27–34)
MCHC RBC-ENTMCNC: 34.4 G/DL — SIGNIFICANT CHANGE UP (ref 32–36)
MCV RBC AUTO: 91.2 FL — SIGNIFICANT CHANGE UP (ref 80–100)
MRSA PCR RESULT.: SIGNIFICANT CHANGE UP
NRBC # BLD AUTO: 0 K/UL — SIGNIFICANT CHANGE UP (ref 0–0)
NRBC # FLD: 0 K/UL — SIGNIFICANT CHANGE UP (ref 0–0)
NRBC BLD AUTO-RTO: 0 /100 WBCS — SIGNIFICANT CHANGE UP (ref 0–0)
PHOSPHATE SERPL-MCNC: 2.7 MG/DL — SIGNIFICANT CHANGE UP (ref 2.5–4.5)
PLATELET # BLD AUTO: 300 K/UL — SIGNIFICANT CHANGE UP (ref 150–400)
PMV BLD: 10 FL — SIGNIFICANT CHANGE UP (ref 7–13)
POTASSIUM SERPL-MCNC: 4.4 MMOL/L — SIGNIFICANT CHANGE UP (ref 3.5–5.3)
POTASSIUM SERPL-SCNC: 4.4 MMOL/L — SIGNIFICANT CHANGE UP (ref 3.5–5.3)
RBC # BLD: 3.51 M/UL — LOW (ref 3.8–5.2)
RBC # FLD: 14.2 % — SIGNIFICANT CHANGE UP (ref 10.3–14.5)
S AUREUS DNA NOSE QL NAA+PROBE: DETECTED
SODIUM SERPL-SCNC: 138 MMOL/L — SIGNIFICANT CHANGE UP (ref 135–145)
WBC # BLD: 7.14 K/UL — SIGNIFICANT CHANGE UP (ref 3.8–10.5)
WBC # FLD AUTO: 7.14 K/UL — SIGNIFICANT CHANGE UP (ref 3.8–10.5)

## 2025-06-23 PROCEDURE — 99233 SBSQ HOSP IP/OBS HIGH 50: CPT

## 2025-06-23 RX ORDER — MUPIROCIN CALCIUM 20 MG/G
1 CREAM TOPICAL
Refills: 0 | Status: COMPLETED | OUTPATIENT
Start: 2025-06-23 | End: 2025-06-28

## 2025-06-23 RX ADMIN — ISOSORBIDE MONONITRATE 30 MILLIGRAM(S): 60 TABLET, EXTENDED RELEASE ORAL at 11:21

## 2025-06-23 RX ADMIN — Medication 3 MILLIGRAM(S): at 22:29

## 2025-06-23 RX ADMIN — OXYCODONE HYDROCHLORIDE 5 MILLIGRAM(S): 30 TABLET ORAL at 12:44

## 2025-06-23 RX ADMIN — OXYCODONE HYDROCHLORIDE 5 MILLIGRAM(S): 30 TABLET ORAL at 03:37

## 2025-06-23 RX ADMIN — INSULIN LISPRO 1: 100 INJECTION, SOLUTION INTRAVENOUS; SUBCUTANEOUS at 09:03

## 2025-06-23 RX ADMIN — Medication 2 TABLET(S): at 22:29

## 2025-06-23 RX ADMIN — ENOXAPARIN SODIUM 40 MILLIGRAM(S): 100 INJECTION SUBCUTANEOUS at 18:01

## 2025-06-23 RX ADMIN — Medication 60 MILLIGRAM(S): at 05:49

## 2025-06-23 RX ADMIN — OXYCODONE HYDROCHLORIDE 5 MILLIGRAM(S): 30 TABLET ORAL at 04:37

## 2025-06-23 RX ADMIN — OXYCODONE HYDROCHLORIDE 5 MILLIGRAM(S): 30 TABLET ORAL at 23:27

## 2025-06-23 RX ADMIN — ATORVASTATIN CALCIUM 20 MILLIGRAM(S): 80 TABLET, FILM COATED ORAL at 22:29

## 2025-06-23 RX ADMIN — POLYETHYLENE GLYCOL 3350 17 GRAM(S): 17 POWDER, FOR SOLUTION ORAL at 11:21

## 2025-06-23 RX ADMIN — CALCITRIOL 0.25 MICROGRAM(S): 0.5 CAPSULE, GELATIN COATED ORAL at 11:20

## 2025-06-23 RX ADMIN — CLOPIDOGREL BISULFATE 75 MILLIGRAM(S): 75 TABLET, FILM COATED ORAL at 11:21

## 2025-06-23 RX ADMIN — METOPROLOL SUCCINATE 25 MILLIGRAM(S): 50 TABLET, EXTENDED RELEASE ORAL at 05:49

## 2025-06-23 RX ADMIN — INSULIN LISPRO 3: 100 INJECTION, SOLUTION INTRAVENOUS; SUBCUTANEOUS at 12:45

## 2025-06-23 RX ADMIN — INSULIN LISPRO 1: 100 INJECTION, SOLUTION INTRAVENOUS; SUBCUTANEOUS at 18:01

## 2025-06-23 RX ADMIN — Medication 1 TABLET(S): at 11:20

## 2025-06-23 RX ADMIN — OXYCODONE HYDROCHLORIDE 5 MILLIGRAM(S): 30 TABLET ORAL at 13:44

## 2025-06-23 NOTE — PROGRESS NOTE ADULT - SUBJECTIVE AND OBJECTIVE BOX
Division of Hospital Medicine  Sebastián Mora MD  Available via MS Teams    SUBJECTIVE / OVERNIGHT EVENTS: No active issues overnight; pt denies any new symptoms    MEDICATIONS  (STANDING):  atorvastatin 20 milliGRAM(s) Oral at bedtime  calcitriol   Capsule 0.25 MICROGram(s) Oral daily  chlorhexidine 2% Cloths 1 Application(s) Topical daily  clopidogrel Tablet 75 milliGRAM(s) Oral daily  dextrose 5%. 1000 milliLiter(s) (50 mL/Hr) IV Continuous <Continuous>  dextrose 5%. 1000 milliLiter(s) (100 mL/Hr) IV Continuous <Continuous>  dextrose 50% Injectable 25 Gram(s) IV Push once  dextrose 50% Injectable 12.5 Gram(s) IV Push once  dextrose 50% Injectable 25 Gram(s) IV Push once  enoxaparin Injectable 40 milliGRAM(s) SubCutaneous every 24 hours  glucagon  Injectable 1 milliGRAM(s) IntraMuscular once  insulin lispro (ADMELOG) corrective regimen sliding scale   SubCutaneous three times a day before meals  insulin lispro (ADMELOG) corrective regimen sliding scale   SubCutaneous at bedtime  isosorbide   mononitrate ER Tablet (IMDUR) 30 milliGRAM(s) Oral daily  metoprolol succinate ER 25 milliGRAM(s) Oral daily  Nephro-cassidy 1 Tablet(s) Oral daily  NIFEdipine XL 60 milliGRAM(s) Oral daily  polyethylene glycol 3350 17 Gram(s) Oral daily  senna 2 Tablet(s) Oral at bedtime    MEDICATIONS  (PRN):  acetaminophen     Tablet .. 650 milliGRAM(s) Oral every 6 hours PRN Temp greater or equal to 38C (100.4F), Mild Pain (1 - 3)  aluminum hydroxide/magnesium hydroxide/simethicone Suspension 30 milliLiter(s) Oral every 4 hours PRN Dyspepsia  artificial tears (preservative free) Ophthalmic Solution 1 Drop(s) Both EYES two times a day PRN Dry Eyes  dextrose Oral Gel 15 Gram(s) Oral once PRN Blood Glucose LESS THAN 70 milliGRAM(s)/deciliter  melatonin 3 milliGRAM(s) Oral at bedtime PRN Insomnia  ondansetron Injectable 4 milliGRAM(s) IV Push every 8 hours PRN Nausea and/or Vomiting  oxyCODONE    IR 2.5 milliGRAM(s) Oral every 6 hours PRN Moderate Pain (4 - 6)  oxyCODONE    IR 5 milliGRAM(s) Oral every 6 hours PRN Severe Pain (7 - 10)      I&O's Summary    22 Jun 2025 07:01  -  23 Jun 2025 07:00  --------------------------------------------------------  IN: 480 mL / OUT: 1300 mL / NET: -820 mL        PHYSICAL EXAM:  Vital Signs Last 24 Hrs  T(C): 36.8 (23 Jun 2025 14:47), Max: 37.2 (22 Jun 2025 21:11)  T(F): 98.2 (23 Jun 2025 14:47), Max: 99 (22 Jun 2025 21:11)  HR: 80 (23 Jun 2025 14:47) (77 - 94)  BP: 140/63 (23 Jun 2025 14:47) (138/61 - 143/67)  BP(mean): --  RR: 17 (23 Jun 2025 14:47) (17 - 18)  SpO2: 98% (23 Jun 2025 14:47) (98% - 100%)    Parameters below as of 23 Jun 2025 14:47  Patient On (Oxygen Delivery Method): room air      CONSTITUTIONAL: NAD  EYES: PERRLA; conjunctiva and sclera clear  ENMT: Moist oral mucosa, no pharyngeal injection or exudates; normal dentition  NECK: Supple, no palpable masses; no thyromegaly  RESPIRATORY: Normal respiratory effort; lungs are clear to auscultation bilaterally; no rales, rhonchi, or wheezing  CARDIOVASCULAR: Regular rate and rhythm, normal S1 and S2, no murmur/rub/gallop; Peripheral pulses are 2+ bilaterally  ABDOMEN: Nontender to palpation, normoactive bowel sounds, no rebound/guarding; No hepatosplenomegaly  MUSCULOSKELETAL: no clubbing or cyanosis of digits; +LLE splint  NEUROLOGY: Awake and alert to person, place; no focal neurological deficits   SKIN: No rashes; no palpable lesions    LABS:                        11.0   7.14  )-----------( 300      ( 23 Jun 2025 06:23 )             32.0     06-23    138  |  104  |  18  ----------------------------<  187[H]  4.4   |  21[L]  |  0.68    Ca    8.5      23 Jun 2025 06:23  Phos  2.7     06-23  Mg     2.30     06-23            Urinalysis Basic - ( 23 Jun 2025 06:23 )    Color: x / Appearance: x / SG: x / pH: x  Gluc: 187 mg/dL / Ketone: x  / Bili: x / Urobili: x   Blood: x / Protein: x / Nitrite: x   Leuk Esterase: x / RBC: x / WBC x   Sq Epi: x / Non Sq Epi: x / Bacteria: x            Imaging and consultant notes reviewed.

## 2025-06-24 DIAGNOSIS — Z01.818 ENCOUNTER FOR OTHER PREPROCEDURAL EXAMINATION: ICD-10-CM

## 2025-06-24 LAB
ANION GAP SERPL CALC-SCNC: 11 MMOL/L — SIGNIFICANT CHANGE UP (ref 7–14)
BUN SERPL-MCNC: 18 MG/DL — SIGNIFICANT CHANGE UP (ref 7–23)
CALCIUM SERPL-MCNC: 8.8 MG/DL — SIGNIFICANT CHANGE UP (ref 8.4–10.5)
CHLORIDE SERPL-SCNC: 100 MMOL/L — SIGNIFICANT CHANGE UP (ref 98–107)
CO2 SERPL-SCNC: 24 MMOL/L — SIGNIFICANT CHANGE UP (ref 22–31)
CREAT SERPL-MCNC: 0.78 MG/DL — SIGNIFICANT CHANGE UP (ref 0.5–1.3)
EGFR: 76 ML/MIN/1.73M2 — SIGNIFICANT CHANGE UP
EGFR: 76 ML/MIN/1.73M2 — SIGNIFICANT CHANGE UP
GLUCOSE BLDC GLUCOMTR-MCNC: 166 MG/DL — HIGH (ref 70–99)
GLUCOSE BLDC GLUCOMTR-MCNC: 182 MG/DL — HIGH (ref 70–99)
GLUCOSE BLDC GLUCOMTR-MCNC: 182 MG/DL — HIGH (ref 70–99)
GLUCOSE BLDC GLUCOMTR-MCNC: 240 MG/DL — HIGH (ref 70–99)
GLUCOSE SERPL-MCNC: 166 MG/DL — HIGH (ref 70–99)
HCT VFR BLD CALC: 35.3 % — SIGNIFICANT CHANGE UP (ref 34.5–45)
HGB BLD-MCNC: 11.5 G/DL — SIGNIFICANT CHANGE UP (ref 11.5–15.5)
MAGNESIUM SERPL-MCNC: 2.2 MG/DL — SIGNIFICANT CHANGE UP (ref 1.6–2.6)
MCHC RBC-ENTMCNC: 30.8 PG — SIGNIFICANT CHANGE UP (ref 27–34)
MCHC RBC-ENTMCNC: 32.6 G/DL — SIGNIFICANT CHANGE UP (ref 32–36)
MCV RBC AUTO: 94.6 FL — SIGNIFICANT CHANGE UP (ref 80–100)
NRBC # BLD AUTO: 0 K/UL — SIGNIFICANT CHANGE UP (ref 0–0)
NRBC # FLD: 0 K/UL — SIGNIFICANT CHANGE UP (ref 0–0)
NRBC BLD AUTO-RTO: 0 /100 WBCS — SIGNIFICANT CHANGE UP (ref 0–0)
PHOSPHATE SERPL-MCNC: 3.1 MG/DL — SIGNIFICANT CHANGE UP (ref 2.5–4.5)
PLATELET # BLD AUTO: 328 K/UL — SIGNIFICANT CHANGE UP (ref 150–400)
PMV BLD: 10.2 FL — SIGNIFICANT CHANGE UP (ref 7–13)
POTASSIUM SERPL-MCNC: 4.2 MMOL/L — SIGNIFICANT CHANGE UP (ref 3.5–5.3)
POTASSIUM SERPL-SCNC: 4.2 MMOL/L — SIGNIFICANT CHANGE UP (ref 3.5–5.3)
RBC # BLD: 3.73 M/UL — LOW (ref 3.8–5.2)
RBC # FLD: 14.2 % — SIGNIFICANT CHANGE UP (ref 10.3–14.5)
SODIUM SERPL-SCNC: 135 MMOL/L — SIGNIFICANT CHANGE UP (ref 135–145)
WBC # BLD: 6.23 K/UL — SIGNIFICANT CHANGE UP (ref 3.8–10.5)
WBC # FLD AUTO: 6.23 K/UL — SIGNIFICANT CHANGE UP (ref 3.8–10.5)

## 2025-06-24 PROCEDURE — 99233 SBSQ HOSP IP/OBS HIGH 50: CPT

## 2025-06-24 RX ADMIN — INSULIN LISPRO 1: 100 INJECTION, SOLUTION INTRAVENOUS; SUBCUTANEOUS at 12:49

## 2025-06-24 RX ADMIN — ENOXAPARIN SODIUM 40 MILLIGRAM(S): 100 INJECTION SUBCUTANEOUS at 18:00

## 2025-06-24 RX ADMIN — MUPIROCIN CALCIUM 1 APPLICATION(S): 20 CREAM TOPICAL at 06:00

## 2025-06-24 RX ADMIN — ISOSORBIDE MONONITRATE 30 MILLIGRAM(S): 60 TABLET, EXTENDED RELEASE ORAL at 12:50

## 2025-06-24 RX ADMIN — OXYCODONE HYDROCHLORIDE 5 MILLIGRAM(S): 30 TABLET ORAL at 07:22

## 2025-06-24 RX ADMIN — Medication 60 MILLIGRAM(S): at 06:01

## 2025-06-24 RX ADMIN — METOPROLOL SUCCINATE 25 MILLIGRAM(S): 50 TABLET, EXTENDED RELEASE ORAL at 06:00

## 2025-06-24 RX ADMIN — Medication 1 APPLICATION(S): at 12:52

## 2025-06-24 RX ADMIN — Medication 2 TABLET(S): at 22:44

## 2025-06-24 RX ADMIN — INSULIN LISPRO 1: 100 INJECTION, SOLUTION INTRAVENOUS; SUBCUTANEOUS at 08:59

## 2025-06-24 RX ADMIN — Medication 1 TABLET(S): at 12:50

## 2025-06-24 RX ADMIN — Medication 3 MILLIGRAM(S): at 22:43

## 2025-06-24 RX ADMIN — CLOPIDOGREL BISULFATE 75 MILLIGRAM(S): 75 TABLET, FILM COATED ORAL at 12:50

## 2025-06-24 RX ADMIN — ATORVASTATIN CALCIUM 20 MILLIGRAM(S): 80 TABLET, FILM COATED ORAL at 22:43

## 2025-06-24 RX ADMIN — OXYCODONE HYDROCHLORIDE 5 MILLIGRAM(S): 30 TABLET ORAL at 00:27

## 2025-06-24 RX ADMIN — OXYCODONE HYDROCHLORIDE 5 MILLIGRAM(S): 30 TABLET ORAL at 06:22

## 2025-06-24 RX ADMIN — INSULIN LISPRO 1: 100 INJECTION, SOLUTION INTRAVENOUS; SUBCUTANEOUS at 18:00

## 2025-06-24 RX ADMIN — MUPIROCIN CALCIUM 1 APPLICATION(S): 20 CREAM TOPICAL at 18:00

## 2025-06-24 RX ADMIN — CALCITRIOL 0.25 MICROGRAM(S): 0.5 CAPSULE, GELATIN COATED ORAL at 12:50

## 2025-06-24 NOTE — PROGRESS NOTE ADULT - SUBJECTIVE AND OBJECTIVE BOX
Division of Hospital Medicine  Sebastián Mora MD  Available via MS Teams    SUBJECTIVE / OVERNIGHT EVENTS: No active issues overnight; pt denies any new symptoms    MEDICATIONS  (STANDING):  atorvastatin 20 milliGRAM(s) Oral at bedtime  calcitriol   Capsule 0.25 MICROGram(s) Oral daily  chlorhexidine 2% Cloths 1 Application(s) Topical daily  clopidogrel Tablet 75 milliGRAM(s) Oral daily  dextrose 5%. 1000 milliLiter(s) (50 mL/Hr) IV Continuous <Continuous>  dextrose 5%. 1000 milliLiter(s) (100 mL/Hr) IV Continuous <Continuous>  dextrose 50% Injectable 25 Gram(s) IV Push once  dextrose 50% Injectable 12.5 Gram(s) IV Push once  dextrose 50% Injectable 25 Gram(s) IV Push once  enoxaparin Injectable 40 milliGRAM(s) SubCutaneous every 24 hours  glucagon  Injectable 1 milliGRAM(s) IntraMuscular once  insulin lispro (ADMELOG) corrective regimen sliding scale   SubCutaneous three times a day before meals  insulin lispro (ADMELOG) corrective regimen sliding scale   SubCutaneous at bedtime  isosorbide   mononitrate ER Tablet (IMDUR) 30 milliGRAM(s) Oral daily  metoprolol succinate ER 25 milliGRAM(s) Oral daily  mupirocin 2% Ointment 1 Application(s) Both Nostrils two times a day  Nephro-cassidy 1 Tablet(s) Oral daily  NIFEdipine XL 60 milliGRAM(s) Oral daily  polyethylene glycol 3350 17 Gram(s) Oral daily  senna 2 Tablet(s) Oral at bedtime    MEDICATIONS  (PRN):  acetaminophen     Tablet .. 650 milliGRAM(s) Oral every 6 hours PRN Temp greater or equal to 38C (100.4F), Mild Pain (1 - 3)  aluminum hydroxide/magnesium hydroxide/simethicone Suspension 30 milliLiter(s) Oral every 4 hours PRN Dyspepsia  artificial tears (preservative free) Ophthalmic Solution 1 Drop(s) Both EYES two times a day PRN Dry Eyes  dextrose Oral Gel 15 Gram(s) Oral once PRN Blood Glucose LESS THAN 70 milliGRAM(s)/deciliter  melatonin 3 milliGRAM(s) Oral at bedtime PRN Insomnia  ondansetron Injectable 4 milliGRAM(s) IV Push every 8 hours PRN Nausea and/or Vomiting  oxyCODONE    IR 2.5 milliGRAM(s) Oral every 6 hours PRN Moderate Pain (4 - 6)  oxyCODONE    IR 5 milliGRAM(s) Oral every 6 hours PRN Severe Pain (7 - 10)      I&O's Summary    23 Jun 2025 07:01  -  24 Jun 2025 07:00  --------------------------------------------------------  IN: 850 mL / OUT: 1800 mL / NET: -950 mL        PHYSICAL EXAM:  Vital Signs Last 24 Hrs  T(C): 37.1 (24 Jun 2025 05:50), Max: 37.2 (23 Jun 2025 22:31)  T(F): 98.8 (24 Jun 2025 05:50), Max: 99 (23 Jun 2025 22:31)  HR: 78 (24 Jun 2025 05:50) (78 - 88)  BP: 154/73 (24 Jun 2025 05:50) (137/76 - 154/73)  BP(mean): --  RR: 17 (24 Jun 2025 05:50) (17 - 18)  SpO2: 99% (24 Jun 2025 05:50) (98% - 100%)    Parameters below as of 24 Jun 2025 05:50  Patient On (Oxygen Delivery Method): room air      CONSTITUTIONAL: NAD  EYES: PERRLA; conjunctiva and sclera clear  ENMT: Moist oral mucosa, no pharyngeal injection or exudates; normal dentition  NECK: Supple, no palpable masses; no thyromegaly  RESPIRATORY: Normal respiratory effort; lungs are clear to auscultation bilaterally; no rales, rhonchi, or wheezing  CARDIOVASCULAR: Regular rate and rhythm, normal S1 and S2, no murmur/rub/gallop; Peripheral pulses are 2+ bilaterally  ABDOMEN: Nontender to palpation, normoactive bowel sounds, no rebound/guarding; No hepatosplenomegaly  MUSCULOSKELETAL: no clubbing or cyanosis of digits; +LLE splint   NEUROLOGY: Awake and alert to person, place  SKIN: No rashes; no palpable lesions    LABS:                        11.5   6.23  )-----------( 328      ( 24 Jun 2025 06:12 )             35.3     06-24    135  |  100  |  18  ----------------------------<  166[H]  4.2   |  24  |  0.78    Ca    8.8      24 Jun 2025 06:12  Phos  3.1     06-24  Mg     2.20     06-24            Urinalysis Basic - ( 24 Jun 2025 06:12 )    Color: x / Appearance: x / SG: x / pH: x  Gluc: 166 mg/dL / Ketone: x  / Bili: x / Urobili: x   Blood: x / Protein: x / Nitrite: x   Leuk Esterase: x / RBC: x / WBC x   Sq Epi: x / Non Sq Epi: x / Bacteria: x            Imaging and consultant notes reviewed.

## 2025-06-24 NOTE — PROGRESS NOTE ADULT - ASSESSMENT
84yFemale w/ L trilam ankle fx with lateral dislocation s/p closed reduction and immobilization. Surgery discussed with patient and daughter. They would like to have her ankle surgery during this admission before dc to [facility].     -OR with Dr. Thompson this Friday, 6/27/25  -Needs medical clearance documented please  -NPO midnight 6/27/25  -1 am preop labs 6/27/25  -NWB LLE in a short-leg trilam splint  -splint precautions  -pain control prn  -ice/cold compress, elevation  -PT/OT eval   84yFemale w/ L trilam ankle fx with lateral dislocation s/p closed reduction and immobilization. Surgery discussed with patient and daughter. They would like to have her ankle surgery during this admission before dc to [facility] [place].     -OR with Dr. Thompson this Friday, 6/27/25  -Needs medical clearance documented please  -NPO midnight 6/27/25  -1 am preop labs 6/27/25  -please hold Plavix today, hold Lovenox on Thursday 6/26/25  -NWB LLE in a short-leg trilam splint  -splint precautions  -pain control prn  -ice/cold compress, elevation  -PT/OT eval

## 2025-06-24 NOTE — PROGRESS NOTE ADULT - SUBJECTIVE AND OBJECTIVE BOX
ORTHOPEDIC PROGRESS NOTE    SUBJECTIVE:  Pt seen and examined at bedside this am.  Doing well.  No acute events overnight.  Pt states pain is well controlled.    OBJECTIVE:  Vital Signs Last 24 Hrs  T(C): 37.1 (24 Jun 2025 05:50), Max: 37.2 (23 Jun 2025 22:31)  T(F): 98.8 (24 Jun 2025 05:50), Max: 99 (23 Jun 2025 22:31)  HR: 78 (24 Jun 2025 05:50) (78 - 88)  BP: 154/73 (24 Jun 2025 05:50) (137/76 - 154/73)  BP(mean): --  RR: 17 (24 Jun 2025 05:50) (17 - 18)  SpO2: 99% (24 Jun 2025 05:50) (98% - 100%)        Physical Exam:  Gen: Lying in bed, NAD  Resp: No increased WOB  LLE:  Splint intact  Motor: TA/EHL/FHL intact  Sensory: DP/SP/Tib/Radha/Saph SILT  Toes WWP    LABS                        10.3   8.98  )-----------( 266      ( 19 Jun 2025 19:26 )             30.5       06-19    140  |  104  |  29[H]  ----------------------------<  121[H]  3.6   |  24  |  0.87    Ca    9.7      19 Jun 2025 19:26    TPro  7.4  /  Alb  3.5  /  TBili  0.4  /  DBili  x   /  AST  21  /  ALT  12  /  AlkPhos  97  06-19      PT/INR - ( 19 Jun 2025 19:26 )   PT: 9.8 sec;   INR: <0.90 ratio         PTT - ( 19 Jun 2025 19:26 )  PTT:25.2 sec    I&O's Summary      acetaminophen     Tablet .. 650 milliGRAM(s) Oral every 6 hours PRN  aluminum hydroxide/magnesium hydroxide/simethicone Suspension 30 milliLiter(s) Oral every 4 hours PRN  dextrose 5%. 1000 milliLiter(s) IV Continuous <Continuous>  dextrose 5%. 1000 milliLiter(s) IV Continuous <Continuous>  dextrose 50% Injectable 25 Gram(s) IV Push once  dextrose 50% Injectable 12.5 Gram(s) IV Push once  dextrose 50% Injectable 25 Gram(s) IV Push once  dextrose Oral Gel 15 Gram(s) Oral once PRN  glucagon  Injectable 1 milliGRAM(s) IntraMuscular once  insulin lispro (ADMELOG) corrective regimen sliding scale   SubCutaneous three times a day before meals  insulin lispro (ADMELOG) corrective regimen sliding scale   SubCutaneous at bedtime  melatonin 3 milliGRAM(s) Oral at bedtime PRN  ondansetron Injectable 4 milliGRAM(s) IV Push every 8 hours PRN

## 2025-06-24 NOTE — PROGRESS NOTE ADULT - PROBLEM SELECTOR PLAN 7
Home medications: Kerendia (Finerenone) 10 mg daily     Monitor BMP  Maintain K>4, Mg>2 and Phos>3   Avoid nephrotoxic medications  Renally dose medications if necessary

## 2025-06-24 NOTE — PROGRESS NOTE ADULT - PROBLEM SELECTOR PLAN 9
DVT prophylaxis: Lovenox  Diet: Consistent carb   Ethics: Full code  Disposition: PT recommended rehab. Pt accepted at [facility], pending auth. Pt and her daughter would like to have her ankle surgery during this admission before d/c to [facility]. Ankle surgery is scheduled for Friday, 06/27/2025. Will f/u with  for auth next week after surgery is completed.

## 2025-06-25 LAB
ANION GAP SERPL CALC-SCNC: 13 MMOL/L — SIGNIFICANT CHANGE UP (ref 7–14)
BUN SERPL-MCNC: 19 MG/DL — SIGNIFICANT CHANGE UP (ref 7–23)
CALCIUM SERPL-MCNC: 9 MG/DL — SIGNIFICANT CHANGE UP (ref 8.4–10.5)
CHLORIDE SERPL-SCNC: 101 MMOL/L — SIGNIFICANT CHANGE UP (ref 98–107)
CO2 SERPL-SCNC: 22 MMOL/L — SIGNIFICANT CHANGE UP (ref 22–31)
CREAT SERPL-MCNC: 0.71 MG/DL — SIGNIFICANT CHANGE UP (ref 0.5–1.3)
EGFR: 85 ML/MIN/1.73M2 — SIGNIFICANT CHANGE UP
EGFR: 85 ML/MIN/1.73M2 — SIGNIFICANT CHANGE UP
GLUCOSE BLDC GLUCOMTR-MCNC: 156 MG/DL — HIGH (ref 70–99)
GLUCOSE BLDC GLUCOMTR-MCNC: 158 MG/DL — HIGH (ref 70–99)
GLUCOSE BLDC GLUCOMTR-MCNC: 197 MG/DL — HIGH (ref 70–99)
GLUCOSE BLDC GLUCOMTR-MCNC: 241 MG/DL — HIGH (ref 70–99)
GLUCOSE BLDC GLUCOMTR-MCNC: <25 MG/DL — CRITICAL LOW (ref 70–99)
GLUCOSE SERPL-MCNC: 183 MG/DL — HIGH (ref 70–99)
HCT VFR BLD CALC: 35.9 % — SIGNIFICANT CHANGE UP (ref 34.5–45)
HGB BLD-MCNC: 12.1 G/DL — SIGNIFICANT CHANGE UP (ref 11.5–15.5)
MAGNESIUM SERPL-MCNC: 2.3 MG/DL — SIGNIFICANT CHANGE UP (ref 1.6–2.6)
MCHC RBC-ENTMCNC: 31.2 PG — SIGNIFICANT CHANGE UP (ref 27–34)
MCHC RBC-ENTMCNC: 33.7 G/DL — SIGNIFICANT CHANGE UP (ref 32–36)
MCV RBC AUTO: 92.5 FL — SIGNIFICANT CHANGE UP (ref 80–100)
NRBC # BLD AUTO: 0 K/UL — SIGNIFICANT CHANGE UP (ref 0–0)
NRBC # FLD: 0 K/UL — SIGNIFICANT CHANGE UP (ref 0–0)
NRBC BLD AUTO-RTO: 0 /100 WBCS — SIGNIFICANT CHANGE UP (ref 0–0)
PHOSPHATE SERPL-MCNC: 3.4 MG/DL — SIGNIFICANT CHANGE UP (ref 2.5–4.5)
PLATELET # BLD AUTO: 324 K/UL — SIGNIFICANT CHANGE UP (ref 150–400)
PMV BLD: 10.1 FL — SIGNIFICANT CHANGE UP (ref 7–13)
POTASSIUM SERPL-MCNC: 4 MMOL/L — SIGNIFICANT CHANGE UP (ref 3.5–5.3)
POTASSIUM SERPL-SCNC: 4 MMOL/L — SIGNIFICANT CHANGE UP (ref 3.5–5.3)
RBC # BLD: 3.88 M/UL — SIGNIFICANT CHANGE UP (ref 3.8–5.2)
RBC # FLD: 13.8 % — SIGNIFICANT CHANGE UP (ref 10.3–14.5)
SODIUM SERPL-SCNC: 136 MMOL/L — SIGNIFICANT CHANGE UP (ref 135–145)
WBC # BLD: 6.98 K/UL — SIGNIFICANT CHANGE UP (ref 3.8–10.5)
WBC # FLD AUTO: 6.98 K/UL — SIGNIFICANT CHANGE UP (ref 3.8–10.5)

## 2025-06-25 PROCEDURE — 99221 1ST HOSP IP/OBS SF/LOW 40: CPT

## 2025-06-25 PROCEDURE — 99233 SBSQ HOSP IP/OBS HIGH 50: CPT

## 2025-06-25 RX ADMIN — ATORVASTATIN CALCIUM 20 MILLIGRAM(S): 80 TABLET, FILM COATED ORAL at 22:08

## 2025-06-25 RX ADMIN — Medication 1 APPLICATION(S): at 12:52

## 2025-06-25 RX ADMIN — INSULIN LISPRO 2: 100 INJECTION, SOLUTION INTRAVENOUS; SUBCUTANEOUS at 12:50

## 2025-06-25 RX ADMIN — Medication 60 MILLIGRAM(S): at 06:12

## 2025-06-25 RX ADMIN — MUPIROCIN CALCIUM 1 APPLICATION(S): 20 CREAM TOPICAL at 07:39

## 2025-06-25 RX ADMIN — INSULIN LISPRO 1: 100 INJECTION, SOLUTION INTRAVENOUS; SUBCUTANEOUS at 18:05

## 2025-06-25 RX ADMIN — MUPIROCIN CALCIUM 1 APPLICATION(S): 20 CREAM TOPICAL at 18:06

## 2025-06-25 RX ADMIN — ISOSORBIDE MONONITRATE 30 MILLIGRAM(S): 60 TABLET, EXTENDED RELEASE ORAL at 12:52

## 2025-06-25 RX ADMIN — INSULIN LISPRO 1: 100 INJECTION, SOLUTION INTRAVENOUS; SUBCUTANEOUS at 09:15

## 2025-06-25 RX ADMIN — METOPROLOL SUCCINATE 25 MILLIGRAM(S): 50 TABLET, EXTENDED RELEASE ORAL at 06:12

## 2025-06-25 RX ADMIN — CALCITRIOL 0.25 MICROGRAM(S): 0.5 CAPSULE, GELATIN COATED ORAL at 12:51

## 2025-06-25 RX ADMIN — Medication 1 TABLET(S): at 12:51

## 2025-06-25 RX ADMIN — Medication 2 TABLET(S): at 22:08

## 2025-06-25 NOTE — PROGRESS NOTE ADULT - SUBJECTIVE AND OBJECTIVE BOX
Division of Hospital Medicine  Sebastián Mora MD  Available via MS Teams    SUBJECTIVE / OVERNIGHT EVENTS: No active issues overnight; pt denies any new symptoms    MEDICATIONS  (STANDING):  atorvastatin 20 milliGRAM(s) Oral at bedtime  calcitriol   Capsule 0.25 MICROGram(s) Oral daily  chlorhexidine 2% Cloths 1 Application(s) Topical daily  dextrose 5%. 1000 milliLiter(s) (50 mL/Hr) IV Continuous <Continuous>  dextrose 5%. 1000 milliLiter(s) (100 mL/Hr) IV Continuous <Continuous>  dextrose 50% Injectable 25 Gram(s) IV Push once  dextrose 50% Injectable 12.5 Gram(s) IV Push once  dextrose 50% Injectable 25 Gram(s) IV Push once  enoxaparin Injectable 40 milliGRAM(s) SubCutaneous every 24 hours  glucagon  Injectable 1 milliGRAM(s) IntraMuscular once  insulin lispro (ADMELOG) corrective regimen sliding scale   SubCutaneous three times a day before meals  insulin lispro (ADMELOG) corrective regimen sliding scale   SubCutaneous at bedtime  isosorbide   mononitrate ER Tablet (IMDUR) 30 milliGRAM(s) Oral daily  metoprolol succinate ER 25 milliGRAM(s) Oral daily  mupirocin 2% Ointment 1 Application(s) Both Nostrils two times a day  Nephro-cassidy 1 Tablet(s) Oral daily  NIFEdipine XL 60 milliGRAM(s) Oral daily  polyethylene glycol 3350 17 Gram(s) Oral daily  senna 2 Tablet(s) Oral at bedtime    MEDICATIONS  (PRN):  acetaminophen     Tablet .. 650 milliGRAM(s) Oral every 6 hours PRN Temp greater or equal to 38C (100.4F), Mild Pain (1 - 3)  aluminum hydroxide/magnesium hydroxide/simethicone Suspension 30 milliLiter(s) Oral every 4 hours PRN Dyspepsia  artificial tears (preservative free) Ophthalmic Solution 1 Drop(s) Both EYES two times a day PRN Dry Eyes  dextrose Oral Gel 15 Gram(s) Oral once PRN Blood Glucose LESS THAN 70 milliGRAM(s)/deciliter  melatonin 3 milliGRAM(s) Oral at bedtime PRN Insomnia  ondansetron Injectable 4 milliGRAM(s) IV Push every 8 hours PRN Nausea and/or Vomiting  oxyCODONE    IR 2.5 milliGRAM(s) Oral every 6 hours PRN Moderate Pain (4 - 6)  oxyCODONE    IR 5 milliGRAM(s) Oral every 6 hours PRN Severe Pain (7 - 10)      I&O's Summary    24 Jun 2025 07:01  -  25 Jun 2025 07:00  --------------------------------------------------------  IN: 300 mL / OUT: 650 mL / NET: -350 mL        PHYSICAL EXAM:  Vital Signs Last 24 Hrs  T(C): 36.8 (25 Jun 2025 05:10), Max: 36.8 (24 Jun 2025 15:44)  T(F): 98.2 (25 Jun 2025 05:10), Max: 98.2 (24 Jun 2025 15:44)  HR: 69 (25 Jun 2025 05:10) (69 - 89)  BP: 150/62 (25 Jun 2025 05:10) (149/64 - 165/78)  BP(mean): --  RR: 18 (25 Jun 2025 05:10) (18 - 18)  SpO2: 96% (25 Jun 2025 05:10) (96% - 100%)    Parameters below as of 25 Jun 2025 05:10  Patient On (Oxygen Delivery Method): room air      CONSTITUTIONAL: NAD  EYES: PERRLA; conjunctiva and sclera clear  ENMT: Moist oral mucosa, no pharyngeal injection or exudates; normal dentition  NECK: Supple, no palpable masses; no thyromegaly  RESPIRATORY: Normal respiratory effort; lungs are clear to auscultation bilaterally; no rales, rhonchi, or wheezing  CARDIOVASCULAR: Regular rate and rhythm, normal S1 and S2, no murmur/rub/gallop; Peripheral pulses are 2+ bilaterally  ABDOMEN: Nontender to palpation, normoactive bowel sounds, no rebound/guarding; No hepatosplenomegaly  MUSCULOSKELETAL: no clubbing or cyanosis of digits; +LLE splint  NEUROLOGY: Awake and alert to person, place; no focal neurological deficits   SKIN: No rashes; no palpable lesions    LABS:                        12.1   6.98  )-----------( 324      ( 25 Jun 2025 06:30 )             35.9     06-25    136  |  101  |  19  ----------------------------<  183[H]  4.0   |  22  |  0.71    Ca    9.0      25 Jun 2025 06:30  Phos  3.4     06-25  Mg     2.30     06-25            Urinalysis Basic - ( 25 Jun 2025 06:30 )    Color: x / Appearance: x / SG: x / pH: x  Gluc: 183 mg/dL / Ketone: x  / Bili: x / Urobili: x   Blood: x / Protein: x / Nitrite: x   Leuk Esterase: x / RBC: x / WBC x   Sq Epi: x / Non Sq Epi: x / Bacteria: x            Imaging and consultant notes reviewed.

## 2025-06-25 NOTE — PROGRESS NOTE ADULT - SUBJECTIVE AND OBJECTIVE BOX
ORTHOPEDIC PROGRESS NOTE    SUBJECTIVE:  Pt seen and examined at bedside this am.  Doing well.  No acute events overnight.  Pt states pain is well controlled.     OBJECTIVE:  Vital Signs Last 24 Hrs  T(C): 36.7 (24 Jun 2025 22:09), Max: 37.1 (24 Jun 2025 05:50)  T(F): 98 (24 Jun 2025 22:09), Max: 98.8 (24 Jun 2025 05:50)  HR: 82 (24 Jun 2025 22:09) (78 - 89)  BP: 149/64 (24 Jun 2025 22:09) (149/64 - 165/78)  BP(mean): --  RR: 18 (24 Jun 2025 22:09) (17 - 18)  SpO2: 99% (24 Jun 2025 22:09) (99% - 100%)    Parameters below as of 24 Jun 2025 22:09  Patient On (Oxygen Delivery Method): room air        Physical Exam:  Gen: Lying in bed, NAD  Resp: No increased WOB  LLE:  Splint intact  Motor: TA/EHL/FHL intact  Sensory: DP/SP/Tib/Radha/Saph SILT  Toes WWP    LABS                        11.5   6.23  )-----------( 328      ( 24 Jun 2025 06:12 )             35.3       06-24    135  |  100  |  18  ----------------------------<  166[H]  4.2   |  24  |  0.78    Ca    8.8      24 Jun 2025 06:12  Phos  3.1     06-24  Mg     2.20     06-24            I&O's Summary    23 Jun 2025 07:01  -  24 Jun 2025 07:00  --------------------------------------------------------  IN: 850 mL / OUT: 1800 mL / NET: -950 mL        acetaminophen     Tablet .. 650 milliGRAM(s) Oral every 6 hours PRN  aluminum hydroxide/magnesium hydroxide/simethicone Suspension 30 milliLiter(s) Oral every 4 hours PRN  artificial tears (preservative free) Ophthalmic Solution 1 Drop(s) Both EYES two times a day PRN  atorvastatin 20 milliGRAM(s) Oral at bedtime  calcitriol   Capsule 0.25 MICROGram(s) Oral daily  chlorhexidine 2% Cloths 1 Application(s) Topical daily  dextrose 5%. 1000 milliLiter(s) IV Continuous <Continuous>  dextrose 5%. 1000 milliLiter(s) IV Continuous <Continuous>  dextrose 50% Injectable 25 Gram(s) IV Push once  dextrose 50% Injectable 12.5 Gram(s) IV Push once  dextrose 50% Injectable 25 Gram(s) IV Push once  dextrose Oral Gel 15 Gram(s) Oral once PRN  enoxaparin Injectable 40 milliGRAM(s) SubCutaneous every 24 hours  glucagon  Injectable 1 milliGRAM(s) IntraMuscular once  insulin lispro (ADMELOG) corrective regimen sliding scale   SubCutaneous three times a day before meals  insulin lispro (ADMELOG) corrective regimen sliding scale   SubCutaneous at bedtime  isosorbide   mononitrate ER Tablet (IMDUR) 30 milliGRAM(s) Oral daily  melatonin 3 milliGRAM(s) Oral at bedtime PRN  metoprolol succinate ER 25 milliGRAM(s) Oral daily  mupirocin 2% Ointment 1 Application(s) Both Nostrils two times a day  Nephro-cassidy 1 Tablet(s) Oral daily  NIFEdipine XL 60 milliGRAM(s) Oral daily  ondansetron Injectable 4 milliGRAM(s) IV Push every 8 hours PRN  oxyCODONE    IR 2.5 milliGRAM(s) Oral every 6 hours PRN  oxyCODONE    IR 5 milliGRAM(s) Oral every 6 hours PRN  polyethylene glycol 3350 17 Gram(s) Oral daily  senna 2 Tablet(s) Oral at bedtime

## 2025-06-25 NOTE — PROGRESS NOTE ADULT - ASSESSMENT
84yFemale w/ L trilam ankle fx with lateral dislocation s/p closed reduction and immobilization. Surgery discussed with patient and daughter. They would like to have her ankle surgery during this admission before dc to [facility].     -OR with Dr. Thompson this Friday, 6/27/25  -medically optimized, appreciate recs  -NPO midnight 6/27/25  -1 am preop labs 6/27/25  -plavix held, hold Lovenox on Thursday 6/26/25  -NWB LLE in a short-leg trilam splint  -splint precautions  -pain control prn  -ice/cold compress, elevation  -PT/OT maikel Berumen PGY1  Orthopedic Surgery  [facility] [id number]  [facility]        [id number]  [facility]  [phone]

## 2025-06-25 NOTE — PROGRESS NOTE ADULT - NSPROGADDITIONALINFOA_GEN_ALL_CORE
Discussed with patient and daughter / NOK (Juliana Mojica) on 6/25/25 nature of condition, potential course / sequelae, options reviewed and patient / daughter requesting surgical intervention specifically open reduction internal fixation procedure and deferring conservative management.  Risks, benefits, indications, alternatives reviewed in detail, risks including but not specifically limited to bleeding, infection, neurovascular / tendon injury, residual pain weakness stiffness swelling, instability, hardware loosening / failure, fracture propagation, malunion, nonunion, wound drainage sequelae, thrombosis, cardiopulmonary sequelae, amongst others.  Patient and daughter also aware potential progression residual joint degeneration secondary to condition, as well as possible necessity ancillary surgeries in future, amongst others.  All questions answered, patient understands and wishes to proceed / translation assistance and consent SOC, for scheduling.
Attending note: case coverage requested for later this week, awaiting decreased soft tissue swelling ankle.

## 2025-06-25 NOTE — PROGRESS NOTE ADULT - PROBLEM SELECTOR PLAN 9
DVT prophylaxis: Lovenox  Diet: Consistent carb   Ethics: Full code  Disposition: PT recommended rehab. Pt accepted at [facility], pending auth. Pt and her daughter would like to have her ankle surgery during this admission before d/c to [facility]. Ankle surgery is scheduled for Friday, 06/27/2025. Will f/u with  for auth next week after surgery is completed.

## 2025-06-26 LAB
ANION GAP SERPL CALC-SCNC: 13 MMOL/L — SIGNIFICANT CHANGE UP (ref 7–14)
BUN SERPL-MCNC: 28 MG/DL — HIGH (ref 7–23)
CALCIUM SERPL-MCNC: 9.7 MG/DL — SIGNIFICANT CHANGE UP (ref 8.4–10.5)
CHLORIDE SERPL-SCNC: 100 MMOL/L — SIGNIFICANT CHANGE UP (ref 98–107)
CO2 SERPL-SCNC: 21 MMOL/L — LOW (ref 22–31)
CREAT SERPL-MCNC: 0.79 MG/DL — SIGNIFICANT CHANGE UP (ref 0.5–1.3)
EGFR: 75 ML/MIN/1.73M2 — SIGNIFICANT CHANGE UP
EGFR: 75 ML/MIN/1.73M2 — SIGNIFICANT CHANGE UP
GLUCOSE BLDC GLUCOMTR-MCNC: 145 MG/DL — HIGH (ref 70–99)
GLUCOSE BLDC GLUCOMTR-MCNC: 160 MG/DL — HIGH (ref 70–99)
GLUCOSE BLDC GLUCOMTR-MCNC: 234 MG/DL — HIGH (ref 70–99)
GLUCOSE BLDC GLUCOMTR-MCNC: 242 MG/DL — HIGH (ref 70–99)
GLUCOSE SERPL-MCNC: 167 MG/DL — HIGH (ref 70–99)
HCT VFR BLD CALC: 39.3 % — SIGNIFICANT CHANGE UP (ref 34.5–45)
HGB BLD-MCNC: 13.5 G/DL — SIGNIFICANT CHANGE UP (ref 11.5–15.5)
MAGNESIUM SERPL-MCNC: 2.4 MG/DL — SIGNIFICANT CHANGE UP (ref 1.6–2.6)
MCHC RBC-ENTMCNC: 31.3 PG — SIGNIFICANT CHANGE UP (ref 27–34)
MCHC RBC-ENTMCNC: 34.4 G/DL — SIGNIFICANT CHANGE UP (ref 32–36)
MCV RBC AUTO: 91 FL — SIGNIFICANT CHANGE UP (ref 80–100)
NRBC # BLD AUTO: 0 K/UL — SIGNIFICANT CHANGE UP (ref 0–0)
NRBC # FLD: 0 K/UL — SIGNIFICANT CHANGE UP (ref 0–0)
NRBC BLD AUTO-RTO: 0 /100 WBCS — SIGNIFICANT CHANGE UP (ref 0–0)
PHOSPHATE SERPL-MCNC: 3.4 MG/DL — SIGNIFICANT CHANGE UP (ref 2.5–4.5)
PLATELET # BLD AUTO: 298 K/UL — SIGNIFICANT CHANGE UP (ref 150–400)
PMV BLD: 9.7 FL — SIGNIFICANT CHANGE UP (ref 7–13)
POTASSIUM SERPL-MCNC: 4.3 MMOL/L — SIGNIFICANT CHANGE UP (ref 3.5–5.3)
POTASSIUM SERPL-SCNC: 4.3 MMOL/L — SIGNIFICANT CHANGE UP (ref 3.5–5.3)
RBC # BLD: 4.32 M/UL — SIGNIFICANT CHANGE UP (ref 3.8–5.2)
RBC # FLD: 13.6 % — SIGNIFICANT CHANGE UP (ref 10.3–14.5)
SODIUM SERPL-SCNC: 134 MMOL/L — LOW (ref 135–145)
WBC # BLD: 7.7 K/UL — SIGNIFICANT CHANGE UP (ref 3.8–10.5)
WBC # FLD AUTO: 7.7 K/UL — SIGNIFICANT CHANGE UP (ref 3.8–10.5)

## 2025-06-26 PROCEDURE — 99233 SBSQ HOSP IP/OBS HIGH 50: CPT

## 2025-06-26 RX ORDER — POVIDONE-IODINE 7.5 %
1 SOLUTION, NON-ORAL TOPICAL ONCE
Refills: 0 | Status: COMPLETED | OUTPATIENT
Start: 2025-06-26 | End: 2025-06-27

## 2025-06-26 RX ORDER — OXYCODONE HYDROCHLORIDE 30 MG/1
2.5 TABLET ORAL EVERY 6 HOURS
Refills: 0 | Status: DISCONTINUED | OUTPATIENT
Start: 2025-06-26 | End: 2025-07-01

## 2025-06-26 RX ORDER — OXYCODONE HYDROCHLORIDE 30 MG/1
5 TABLET ORAL EVERY 6 HOURS
Refills: 0 | Status: DISCONTINUED | OUTPATIENT
Start: 2025-06-26 | End: 2025-07-01

## 2025-06-26 RX ADMIN — Medication 1 APPLICATION(S): at 12:43

## 2025-06-26 RX ADMIN — Medication 1 TABLET(S): at 12:42

## 2025-06-26 RX ADMIN — INSULIN LISPRO 2: 100 INJECTION, SOLUTION INTRAVENOUS; SUBCUTANEOUS at 12:41

## 2025-06-26 RX ADMIN — ISOSORBIDE MONONITRATE 30 MILLIGRAM(S): 60 TABLET, EXTENDED RELEASE ORAL at 12:43

## 2025-06-26 RX ADMIN — INSULIN LISPRO 1: 100 INJECTION, SOLUTION INTRAVENOUS; SUBCUTANEOUS at 08:59

## 2025-06-26 RX ADMIN — MUPIROCIN CALCIUM 1 APPLICATION(S): 20 CREAM TOPICAL at 05:43

## 2025-06-26 RX ADMIN — ATORVASTATIN CALCIUM 20 MILLIGRAM(S): 80 TABLET, FILM COATED ORAL at 21:35

## 2025-06-26 RX ADMIN — METOPROLOL SUCCINATE 25 MILLIGRAM(S): 50 TABLET, EXTENDED RELEASE ORAL at 05:42

## 2025-06-26 RX ADMIN — POLYETHYLENE GLYCOL 3350 17 GRAM(S): 17 POWDER, FOR SOLUTION ORAL at 12:42

## 2025-06-26 RX ADMIN — MUPIROCIN CALCIUM 1 APPLICATION(S): 20 CREAM TOPICAL at 18:14

## 2025-06-26 RX ADMIN — Medication 1 APPLICATION(S): at 18:14

## 2025-06-26 RX ADMIN — Medication 60 MILLIGRAM(S): at 05:43

## 2025-06-26 RX ADMIN — CALCITRIOL 0.25 MICROGRAM(S): 0.5 CAPSULE, GELATIN COATED ORAL at 12:42

## 2025-06-26 NOTE — PROGRESS NOTE ADULT - PROBLEM SELECTOR PLAN 9
DVT prophylaxis: Lovenox  Diet: Consistent carb   Ethics: Full code  Disposition: PT recommended rehab. Pt accepted at [facility], pending auth. Pt and her daughter would like to have her ankle surgery during this admission before d/c to [facility]. Ankle surgery is scheduled for Friday, 06/27/2025. Will f/u with  for auth next week after surgery is completed.

## 2025-06-26 NOTE — PROGRESS NOTE ADULT - SUBJECTIVE AND OBJECTIVE BOX
Division of Hospital Medicine  Sebastián Mora MD  Available via MS Teams    SUBJECTIVE / OVERNIGHT EVENTS: No active issues overnight; pt denies any new symptoms    MEDICATIONS  (STANDING):  atorvastatin 20 milliGRAM(s) Oral at bedtime  calcitriol   Capsule 0.25 MICROGram(s) Oral daily  chlorhexidine 2% Cloths 1 Application(s) Topical daily  chlorhexidine 2% Cloths 1 Application(s) Topical every 12 hours  dextrose 5%. 1000 milliLiter(s) (50 mL/Hr) IV Continuous <Continuous>  dextrose 5%. 1000 milliLiter(s) (100 mL/Hr) IV Continuous <Continuous>  dextrose 50% Injectable 25 Gram(s) IV Push once  dextrose 50% Injectable 12.5 Gram(s) IV Push once  dextrose 50% Injectable 25 Gram(s) IV Push once  glucagon  Injectable 1 milliGRAM(s) IntraMuscular once  insulin lispro (ADMELOG) corrective regimen sliding scale   SubCutaneous three times a day before meals  insulin lispro (ADMELOG) corrective regimen sliding scale   SubCutaneous at bedtime  isosorbide   mononitrate ER Tablet (IMDUR) 30 milliGRAM(s) Oral daily  metoprolol succinate ER 25 milliGRAM(s) Oral daily  mupirocin 2% Ointment 1 Application(s) Both Nostrils two times a day  Nephro-cassidy 1 Tablet(s) Oral daily  NIFEdipine XL 60 milliGRAM(s) Oral daily  polyethylene glycol 3350 17 Gram(s) Oral daily  povidone iodine 10% Nasal Swab 1 Application(s) Both Nostrils once  senna 2 Tablet(s) Oral at bedtime    MEDICATIONS  (PRN):  acetaminophen     Tablet .. 650 milliGRAM(s) Oral every 6 hours PRN Temp greater or equal to 38C (100.4F), Mild Pain (1 - 3)  aluminum hydroxide/magnesium hydroxide/simethicone Suspension 30 milliLiter(s) Oral every 4 hours PRN Dyspepsia  artificial tears (preservative free) Ophthalmic Solution 1 Drop(s) Both EYES two times a day PRN Dry Eyes  dextrose Oral Gel 15 Gram(s) Oral once PRN Blood Glucose LESS THAN 70 milliGRAM(s)/deciliter  melatonin 3 milliGRAM(s) Oral at bedtime PRN Insomnia  ondansetron Injectable 4 milliGRAM(s) IV Push every 8 hours PRN Nausea and/or Vomiting  oxyCODONE    IR 2.5 milliGRAM(s) Oral every 6 hours PRN Moderate Pain (4 - 6)  oxyCODONE    IR 5 milliGRAM(s) Oral every 6 hours PRN Severe Pain (7 - 10)      I&O's Summary    25 Jun 2025 07:01  -  26 Jun 2025 07:00  --------------------------------------------------------  IN: 0 mL / OUT: 420 mL / NET: -420 mL        PHYSICAL EXAM:  Vital Signs Last 24 Hrs  T(C): 36.7 (26 Jun 2025 05:34), Max: 37.4 (25 Jun 2025 15:10)  T(F): 98.1 (26 Jun 2025 05:34), Max: 99.4 (25 Jun 2025 15:10)  HR: 67 (26 Jun 2025 07:28) (67 - 85)  BP: 156/68 (26 Jun 2025 07:28) (135/89 - 181/85)  BP(mean): --  RR: 17 (26 Jun 2025 05:34) (17 - 18)  SpO2: 100% (26 Jun 2025 05:34) (98% - 100%)    Parameters below as of 26 Jun 2025 05:34  Patient On (Oxygen Delivery Method): room air      CONSTITUTIONAL: NAD  EYES: PERRLA; conjunctiva and sclera clear  ENMT: Moist oral mucosa, no pharyngeal injection or exudates; normal dentition  NECK: Supple, no palpable masses; no thyromegaly  RESPIRATORY: Normal respiratory effort; lungs are clear to auscultation bilaterally; no rales, rhonchi, or wheezing  CARDIOVASCULAR: Regular rate and rhythm, normal S1 and S2, no murmur/rub/gallop; Peripheral pulses are 2+ bilaterally  ABDOMEN: Nontender to palpation, normoactive bowel sounds, no rebound/guarding; No hepatosplenomegaly  MUSCULOSKELETAL: no clubbing or cyanosis of digits; +LLE splint  NEUROLOGY: Awake and alert to person, place; no focal neurological deficits   SKIN: No rashes; no palpable lesions    LABS:                        13.5   7.70  )-----------( 298      ( 26 Jun 2025 07:45 )             39.3     06-26    134[L]  |  100  |  28[H]  ----------------------------<  167[H]  4.3   |  21[L]  |  0.79    Ca    9.7      26 Jun 2025 07:45  Phos  3.4     06-26  Mg     2.40     06-26            Urinalysis Basic - ( 26 Jun 2025 07:45 )    Color: x / Appearance: x / SG: x / pH: x  Gluc: 167 mg/dL / Ketone: x  / Bili: x / Urobili: x   Blood: x / Protein: x / Nitrite: x   Leuk Esterase: x / RBC: x / WBC x   Sq Epi: x / Non Sq Epi: x / Bacteria: x            Imaging and consultant notes reviewed.

## 2025-06-26 NOTE — PROGRESS NOTE ADULT - ASSESSMENT
84yFemale w/ L trilam ankle fx with lateral dislocation s/p closed reduction and immobilization. Surgery discussed with patient and daughter. They would like to have her ankle surgery during this admission before dc to [facility].     -OR with Dr. Thompson this Friday, 6/27/25  -hold Lovenox on Thursday 6/26/25  -medically optimized, appreciate recs  -NPO midnight 6/27/25  -1 am preop labs 6/27/25  -plavix held  -NWB LLE in a short-leg trilam splint  -splint precautions  -pain control prn  -ice/cold compress, elevation  -PT/OT maikel Berumen PGY1  Orthopedic Surgery  [facility] [id number]  [facility]        [id number]  [facility]  [id number]/[phone]

## 2025-06-26 NOTE — PROGRESS NOTE ADULT - SUBJECTIVE AND OBJECTIVE BOX
ORTHOPEDIC PROGRESS NOTE    SUBJECTIVE:  Pt seen and examined at bedside this am.  Doing well.  No acute events overnight.  Pt states pain is well controlled. Prepared for OR tomorrow      OBJECTIVE:  Vital Signs Last 24 Hrs  T(C): 36.7 (26 Jun 2025 05:34), Max: 37.4 (25 Jun 2025 15:10)  T(F): 98.1 (26 Jun 2025 05:34), Max: 99.4 (25 Jun 2025 15:10)  HR: 81 (26 Jun 2025 05:34) (81 - 85)  BP: 181/85 (26 Jun 2025 05:34) (135/89 - 181/85)  BP(mean): --  RR: 17 (26 Jun 2025 05:34) (17 - 18)  SpO2: 100% (26 Jun 2025 05:34) (98% - 100%)    Parameters below as of 26 Jun 2025 05:34  Patient On (Oxygen Delivery Method): room air        Physical Exam:  Gen: Lying in bed, NAD  Resp: No increased WOB  LLE:  Splint intact  Motor: TA/EHL/FHL intact  Sensory: DP/SP/Tib/Radha/Saph SILT  Toes WWP      LABS                        12.1   6.98  )-----------( 324      ( 25 Jun 2025 06:30 )             35.9       06-25    136  |  101  |  19  ----------------------------<  183[H]  4.0   |  22  |  0.71    Ca    9.0      25 Jun 2025 06:30  Phos  3.4     06-25  Mg     2.30     06-25            I&O's Summary    24 Jun 2025 07:01  -  25 Jun 2025 07:00  --------------------------------------------------------  IN: 300 mL / OUT: 650 mL / NET: -350 mL        acetaminophen     Tablet .. 650 milliGRAM(s) Oral every 6 hours PRN  aluminum hydroxide/magnesium hydroxide/simethicone Suspension 30 milliLiter(s) Oral every 4 hours PRN  artificial tears (preservative free) Ophthalmic Solution 1 Drop(s) Both EYES two times a day PRN  atorvastatin 20 milliGRAM(s) Oral at bedtime  calcitriol   Capsule 0.25 MICROGram(s) Oral daily  chlorhexidine 2% Cloths 1 Application(s) Topical daily  dextrose 5%. 1000 milliLiter(s) IV Continuous <Continuous>  dextrose 5%. 1000 milliLiter(s) IV Continuous <Continuous>  dextrose 50% Injectable 25 Gram(s) IV Push once  dextrose 50% Injectable 12.5 Gram(s) IV Push once  dextrose 50% Injectable 25 Gram(s) IV Push once  dextrose Oral Gel 15 Gram(s) Oral once PRN  enoxaparin Injectable 40 milliGRAM(s) SubCutaneous every 24 hours  glucagon  Injectable 1 milliGRAM(s) IntraMuscular once  insulin lispro (ADMELOG) corrective regimen sliding scale   SubCutaneous three times a day before meals  insulin lispro (ADMELOG) corrective regimen sliding scale   SubCutaneous at bedtime  isosorbide   mononitrate ER Tablet (IMDUR) 30 milliGRAM(s) Oral daily  melatonin 3 milliGRAM(s) Oral at bedtime PRN  metoprolol succinate ER 25 milliGRAM(s) Oral daily  mupirocin 2% Ointment 1 Application(s) Both Nostrils two times a day  Nephro-cassidy 1 Tablet(s) Oral daily  NIFEdipine XL 60 milliGRAM(s) Oral daily  ondansetron Injectable 4 milliGRAM(s) IV Push every 8 hours PRN  oxyCODONE    IR 2.5 milliGRAM(s) Oral every 6 hours PRN  oxyCODONE    IR 5 milliGRAM(s) Oral every 6 hours PRN  polyethylene glycol 3350 17 Gram(s) Oral daily  senna 2 Tablet(s) Oral at bedtime

## 2025-06-27 LAB
ANION GAP SERPL CALC-SCNC: 14 MMOL/L — SIGNIFICANT CHANGE UP (ref 7–14)
APTT BLD: 27.5 SEC — SIGNIFICANT CHANGE UP (ref 26.1–36.8)
BLD GP AB SCN SERPL QL: NEGATIVE — SIGNIFICANT CHANGE UP
BUN SERPL-MCNC: 29 MG/DL — HIGH (ref 7–23)
CALCIUM SERPL-MCNC: 9 MG/DL — SIGNIFICANT CHANGE UP (ref 8.4–10.5)
CHLORIDE SERPL-SCNC: 102 MMOL/L — SIGNIFICANT CHANGE UP (ref 98–107)
CO2 SERPL-SCNC: 20 MMOL/L — LOW (ref 22–31)
CREAT SERPL-MCNC: 0.74 MG/DL — SIGNIFICANT CHANGE UP (ref 0.5–1.3)
EGFR: 81 ML/MIN/1.73M2 — SIGNIFICANT CHANGE UP
EGFR: 81 ML/MIN/1.73M2 — SIGNIFICANT CHANGE UP
GLUCOSE BLDC GLUCOMTR-MCNC: 137 MG/DL — HIGH (ref 70–99)
GLUCOSE BLDC GLUCOMTR-MCNC: 144 MG/DL — HIGH (ref 70–99)
GLUCOSE BLDC GLUCOMTR-MCNC: 148 MG/DL — HIGH (ref 70–99)
GLUCOSE BLDC GLUCOMTR-MCNC: 169 MG/DL — HIGH (ref 70–99)
GLUCOSE BLDC GLUCOMTR-MCNC: 186 MG/DL — HIGH (ref 70–99)
GLUCOSE SERPL-MCNC: 167 MG/DL — HIGH (ref 70–99)
HCT VFR BLD CALC: 33.7 % — LOW (ref 34.5–45)
HGB BLD-MCNC: 11.7 G/DL — SIGNIFICANT CHANGE UP (ref 11.5–15.5)
INR BLD: 0.9 RATIO — SIGNIFICANT CHANGE UP (ref 0.85–1.16)
MCHC RBC-ENTMCNC: 31.7 PG — SIGNIFICANT CHANGE UP (ref 27–34)
MCHC RBC-ENTMCNC: 34.7 G/DL — SIGNIFICANT CHANGE UP (ref 32–36)
MCV RBC AUTO: 91.3 FL — SIGNIFICANT CHANGE UP (ref 80–100)
NRBC # BLD AUTO: 0 K/UL — SIGNIFICANT CHANGE UP (ref 0–0)
NRBC # FLD: 0 K/UL — SIGNIFICANT CHANGE UP (ref 0–0)
NRBC BLD AUTO-RTO: 0 /100 WBCS — SIGNIFICANT CHANGE UP (ref 0–0)
PLATELET # BLD AUTO: 335 K/UL — SIGNIFICANT CHANGE UP (ref 150–400)
PMV BLD: 9.9 FL — SIGNIFICANT CHANGE UP (ref 7–13)
POTASSIUM SERPL-MCNC: 4.3 MMOL/L — SIGNIFICANT CHANGE UP (ref 3.5–5.3)
POTASSIUM SERPL-SCNC: 4.3 MMOL/L — SIGNIFICANT CHANGE UP (ref 3.5–5.3)
PROTHROM AB SERPL-ACNC: 10.4 SEC — SIGNIFICANT CHANGE UP (ref 9.9–13.4)
RBC # BLD: 3.69 M/UL — LOW (ref 3.8–5.2)
RBC # FLD: 13.5 % — SIGNIFICANT CHANGE UP (ref 10.3–14.5)
RH IG SCN BLD-IMP: POSITIVE — SIGNIFICANT CHANGE UP
SODIUM SERPL-SCNC: 136 MMOL/L — SIGNIFICANT CHANGE UP (ref 135–145)
WBC # BLD: 7.53 K/UL — SIGNIFICANT CHANGE UP (ref 3.8–10.5)
WBC # FLD AUTO: 7.53 K/UL — SIGNIFICANT CHANGE UP (ref 3.8–10.5)

## 2025-06-27 PROCEDURE — 99233 SBSQ HOSP IP/OBS HIGH 50: CPT

## 2025-06-27 PROCEDURE — 27829 TREAT LOWER LEG JOINT: CPT | Mod: LT

## 2025-06-27 PROCEDURE — 27822 TREATMENT OF ANKLE FRACTURE: CPT | Mod: LT

## 2025-06-27 DEVICE — ARTHREX SYNDESMOSIS TIGHTROPE XP STAINLESS STEEL: Type: IMPLANTABLE DEVICE | Site: LEFT | Status: FUNCTIONAL

## 2025-06-27 DEVICE — IMPLANTABLE DEVICE: Type: IMPLANTABLE DEVICE | Site: LEFT | Status: FUNCTIONAL

## 2025-06-27 DEVICE — SURGIFOAM 2 X 6CM X 7MM (12-7): Type: IMPLANTABLE DEVICE | Site: LEFT | Status: FUNCTIONAL

## 2025-06-27 DEVICE — GWIRE TROCAR TIP 0.86MM MUST ORD MULT OF 6 EA: Type: IMPLANTABLE DEVICE | Site: LEFT | Status: FUNCTIONAL

## 2025-06-27 DEVICE — SYS IMP FIBULOCK STRL: Type: IMPLANTABLE DEVICE | Site: LEFT | Status: FUNCTIONAL

## 2025-06-27 DEVICE — ARTHREX BB-TAK NON-THREADED: Type: IMPLANTABLE DEVICE | Site: LEFT | Status: FUNCTIONAL

## 2025-06-27 RX ORDER — CLOPIDOGREL BISULFATE 75 MG/1
75 TABLET, FILM COATED ORAL DAILY
Refills: 0 | Status: DISCONTINUED | OUTPATIENT
Start: 2025-06-28 | End: 2025-07-01

## 2025-06-27 RX ORDER — ACETAMINOPHEN 500 MG/5ML
750 LIQUID (ML) ORAL ONCE
Refills: 0 | Status: DISCONTINUED | OUTPATIENT
Start: 2025-06-27 | End: 2025-06-28

## 2025-06-27 RX ORDER — ASPIRIN 325 MG
81 TABLET ORAL
Refills: 0 | Status: DISCONTINUED | OUTPATIENT
Start: 2025-06-27 | End: 2025-06-27

## 2025-06-27 RX ORDER — ASPIRIN 325 MG
81 TABLET ORAL DAILY
Refills: 0 | Status: DISCONTINUED | OUTPATIENT
Start: 2025-06-27 | End: 2025-07-01

## 2025-06-27 RX ORDER — ONDANSETRON HCL/PF 4 MG/2 ML
4 VIAL (ML) INJECTION ONCE
Refills: 0 | Status: DISCONTINUED | OUTPATIENT
Start: 2025-06-27 | End: 2025-06-28

## 2025-06-27 RX ORDER — OXYCODONE HYDROCHLORIDE 30 MG/1
5 TABLET ORAL ONCE
Refills: 0 | Status: DISCONTINUED | OUTPATIENT
Start: 2025-06-27 | End: 2025-06-28

## 2025-06-27 RX ORDER — METOPROLOL SUCCINATE 50 MG/1
2.5 TABLET, EXTENDED RELEASE ORAL ONCE
Refills: 0 | Status: COMPLETED | OUTPATIENT
Start: 2025-06-27 | End: 2025-06-27

## 2025-06-27 RX ORDER — HYDROMORPHONE/SOD CHLOR,ISO/PF 2 MG/10 ML
0.25 SYRINGE (ML) INJECTION
Refills: 0 | Status: DISCONTINUED | OUTPATIENT
Start: 2025-06-27 | End: 2025-06-28

## 2025-06-27 RX ORDER — CEFAZOLIN SODIUM IN 0.9 % NACL 3 G/100 ML
2000 INTRAVENOUS SOLUTION, PIGGYBACK (ML) INTRAVENOUS EVERY 8 HOURS
Refills: 0 | Status: COMPLETED | OUTPATIENT
Start: 2025-06-28 | End: 2025-06-28

## 2025-06-27 RX ADMIN — MUPIROCIN CALCIUM 1 APPLICATION(S): 20 CREAM TOPICAL at 05:20

## 2025-06-27 RX ADMIN — Medication 1 APPLICATION(S): at 05:20

## 2025-06-27 RX ADMIN — METOPROLOL SUCCINATE 25 MILLIGRAM(S): 50 TABLET, EXTENDED RELEASE ORAL at 05:20

## 2025-06-27 RX ADMIN — CALCITRIOL 0.25 MICROGRAM(S): 0.5 CAPSULE, GELATIN COATED ORAL at 13:20

## 2025-06-27 RX ADMIN — INSULIN LISPRO 1: 100 INJECTION, SOLUTION INTRAVENOUS; SUBCUTANEOUS at 09:21

## 2025-06-27 RX ADMIN — METOPROLOL SUCCINATE 2.5 MILLIGRAM(S): 50 TABLET, EXTENDED RELEASE ORAL at 02:04

## 2025-06-27 RX ADMIN — Medication 1 APPLICATION(S): at 15:43

## 2025-06-27 RX ADMIN — Medication 60 MILLIGRAM(S): at 05:20

## 2025-06-27 RX ADMIN — ATORVASTATIN CALCIUM 20 MILLIGRAM(S): 80 TABLET, FILM COATED ORAL at 22:40

## 2025-06-27 RX ADMIN — ISOSORBIDE MONONITRATE 30 MILLIGRAM(S): 60 TABLET, EXTENDED RELEASE ORAL at 13:19

## 2025-06-27 RX ADMIN — Medication 1 APPLICATION(S): at 13:21

## 2025-06-27 RX ADMIN — Medication 0.25 MILLIGRAM(S): at 22:40

## 2025-06-27 RX ADMIN — Medication 1 TABLET(S): at 13:20

## 2025-06-27 RX ADMIN — Medication 0.25 MILLIGRAM(S): at 23:15

## 2025-06-27 NOTE — DIETITIAN INITIAL EVALUATION ADULT - CONTINUE CURRENT NUTRITION CARE PLAN
Called Jennifer and left message calling to follow up, will try to call her in AM if pt not in ER.   yes

## 2025-06-27 NOTE — DIETITIAN INITIAL EVALUATION ADULT - OBTAIN WEEKLY WEIGHT
Return to the ER for any further acute problems or issues.  Atrial fibrillation that last more than several hour duration and does not respond to metoprolol worsening shortness of breath worsening fatigue fevers or passing out episodes.   yes

## 2025-06-27 NOTE — PROGRESS NOTE ADULT - SUBJECTIVE AND OBJECTIVE BOX
Division of Hospital Medicine  Sebastián Mora MD  Available via MS Teams    SUBJECTIVE / OVERNIGHT EVENTS: No active issues overnight; pt denies any new symptoms    MEDICATIONS  (STANDING):  atorvastatin 20 milliGRAM(s) Oral at bedtime  calcitriol   Capsule 0.25 MICROGram(s) Oral daily  chlorhexidine 2% Cloths 1 Application(s) Topical daily  dextrose 5%. 1000 milliLiter(s) (50 mL/Hr) IV Continuous <Continuous>  dextrose 5%. 1000 milliLiter(s) (100 mL/Hr) IV Continuous <Continuous>  dextrose 50% Injectable 25 Gram(s) IV Push once  dextrose 50% Injectable 12.5 Gram(s) IV Push once  dextrose 50% Injectable 25 Gram(s) IV Push once  glucagon  Injectable 1 milliGRAM(s) IntraMuscular once  insulin lispro (ADMELOG) corrective regimen sliding scale   SubCutaneous three times a day before meals  insulin lispro (ADMELOG) corrective regimen sliding scale   SubCutaneous at bedtime  isosorbide   mononitrate ER Tablet (IMDUR) 30 milliGRAM(s) Oral daily  metoprolol succinate ER 25 milliGRAM(s) Oral daily  mupirocin 2% Ointment 1 Application(s) Both Nostrils two times a day  Nephro-cassidy 1 Tablet(s) Oral daily  NIFEdipine XL 60 milliGRAM(s) Oral daily  polyethylene glycol 3350 17 Gram(s) Oral daily  povidone iodine 10% Nasal Swab 1 Application(s) Both Nostrils once  senna 2 Tablet(s) Oral at bedtime    MEDICATIONS  (PRN):  acetaminophen     Tablet .. 650 milliGRAM(s) Oral every 6 hours PRN Temp greater or equal to 38C (100.4F), Mild Pain (1 - 3)  aluminum hydroxide/magnesium hydroxide/simethicone Suspension 30 milliLiter(s) Oral every 4 hours PRN Dyspepsia  artificial tears (preservative free) Ophthalmic Solution 1 Drop(s) Both EYES two times a day PRN Dry Eyes  dextrose Oral Gel 15 Gram(s) Oral once PRN Blood Glucose LESS THAN 70 milliGRAM(s)/deciliter  melatonin 3 milliGRAM(s) Oral at bedtime PRN Insomnia  ondansetron Injectable 4 milliGRAM(s) IV Push every 8 hours PRN Nausea and/or Vomiting  oxyCODONE    IR 2.5 milliGRAM(s) Oral every 6 hours PRN Moderate Pain (4 - 6)  oxyCODONE    IR 5 milliGRAM(s) Oral every 6 hours PRN Severe Pain (7 - 10)      I&O's Summary    26 Jun 2025 07:01  -  27 Jun 2025 07:00  --------------------------------------------------------  IN: 800 mL / OUT: 1600 mL / NET: -800 mL        PHYSICAL EXAM:  Vital Signs Last 24 Hrs  T(C): 36.9 (27 Jun 2025 05:14), Max: 37.4 (26 Jun 2025 12:45)  T(F): 98.4 (27 Jun 2025 05:14), Max: 99.4 (26 Jun 2025 12:45)  HR: 71 (27 Jun 2025 07:26) (68 - 83)  BP: 148/61 (27 Jun 2025 07:26) (146/64 - 175/70)  BP(mean): --  RR: 17 (27 Jun 2025 05:14) (17 - 18)  SpO2: 100% (27 Jun 2025 05:14) (99% - 100%)    Parameters below as of 27 Jun 2025 05:14  Patient On (Oxygen Delivery Method): room air      CONSTITUTIONAL: NAD  EYES: PERRLA; conjunctiva and sclera clear  ENMT: Moist oral mucosa, no pharyngeal injection or exudates; normal dentition  NECK: Supple, no palpable masses; no thyromegaly  RESPIRATORY: Normal respiratory effort; lungs are clear to auscultation bilaterally; no rales, rhonchi, or wheezing  CARDIOVASCULAR: Regular rate and rhythm, normal S1 and S2, no murmur/rub/gallop; Peripheral pulses are 2+ bilaterally  ABDOMEN: Nontender to palpation, normoactive bowel sounds, no rebound/guarding; No hepatosplenomegaly  MUSCULOSKELETAL: no clubbing or cyanosis of digits; +LLE splint  NEUROLOGY: Awake and alert to person, place  SKIN: No rashes; no palpable lesions    LABS:                        11.7   7.53  )-----------( 335      ( 27 Jun 2025 00:45 )             33.7     06-27    136  |  102  |  29[H]  ----------------------------<  167[H]  4.3   |  20[L]  |  0.74    Ca    9.0      27 Jun 2025 00:45  Phos  3.4     06-26  Mg     2.40     06-26      PT/INR - ( 27 Jun 2025 00:45 )   PT: 10.4 sec;   INR: 0.90 ratio         PTT - ( 27 Jun 2025 00:45 )  PTT:27.5 sec      Urinalysis Basic - ( 27 Jun 2025 00:45 )    Color: x / Appearance: x / SG: x / pH: x  Gluc: 167 mg/dL / Ketone: x  / Bili: x / Urobili: x   Blood: x / Protein: x / Nitrite: x   Leuk Esterase: x / RBC: x / WBC x   Sq Epi: x / Non Sq Epi: x / Bacteria: x            Imaging and consultant notes reviewed.

## 2025-06-27 NOTE — DIETITIAN INITIAL EVALUATION ADULT - OTHER CALCULATIONS
Defer fluid needs to MD/Team.   Ideal Body Weight: 100lbs / 45.3kg +/-10%   Unable to access HIE at this times.

## 2025-06-27 NOTE — DIETITIAN INITIAL EVALUATION ADULT - ADD RECOMMEND
- Advanced diet as medical feasible to Consistent Carbohydrate diet. Defer food and fluid consistency to SLP/Team.   - Recommend Glucerna 1x daily (provides 220 kcal, 10 gm protein per 8oz serving).   - Continue micronutrient supplementation as medically appropriate.  - Monitor weights, diet tolerance, skin integrity, BMs, pertinent labs.   - RDN services remain available as needed.

## 2025-06-27 NOTE — DIETITIAN INITIAL EVALUATION ADULT - PROBLEM SELECTOR PLAN 7
DVT prophylaxis: Lovenox  GI prophylaxis: NI  Diet: Consistent carb   Ethics: Full code  Disposition: Pending clinical course

## 2025-06-27 NOTE — DIETITIAN INITIAL EVALUATION ADULT - ORAL INTAKE PTA/DIET HISTORY
Pt lives at home with daughter. They cook together at home. No difficulty obtaining groceries. Low sugar diet followed PTA. Fish oils, vitamin D, and multivitamin taken PTA.  Pt reported stable UBW 113lbs/51kg. Pt denies any recent weight changes.

## 2025-06-27 NOTE — DIETITIAN INITIAL EVALUATION ADULT - NS FNS DIET ORDER
Diet, NPO after Midnight:      NPO Start Date: 26-Jun-2025,   NPO Start Time: 23:59  Except Medications (06-26-25 @ 07:11) [Active]  Diet, Consistent Carbohydrate/No Snacks:   DASH/TLC {Sodium & Cholesterol Restricted} (DASH) (06-20-25 @ 03:27) [Active]

## 2025-06-27 NOTE — PROGRESS NOTE ADULT - SUBJECTIVE AND OBJECTIVE BOX
ORTHOPEDIC PROGRESS NOTE    SUBJECTIVE:  Pt seen and examined at bedside this am.  Doing well.  No acute events overnight.  Pt states pain is well controlled. Prepared for OR    OBJECTIVE:  Vital Signs Last 24 Hrs  T(C): 36.9 (27 Jun 2025 05:14), Max: 37.4 (26 Jun 2025 12:45)  T(F): 98.4 (27 Jun 2025 05:14), Max: 99.4 (26 Jun 2025 12:45)  HR: 71 (27 Jun 2025 07:26) (68 - 83)  BP: 148/61 (27 Jun 2025 07:26) (146/64 - 175/70)  BP(mean): --  RR: 17 (27 Jun 2025 05:14) (17 - 18)  SpO2: 100% (27 Jun 2025 05:14) (99% - 100%)    Parameters below as of 27 Jun 2025 05:14  Patient On (Oxygen Delivery Method): room air        Physical Exam:  Gen: Lying in bed, NAD  Resp: No increased WOB  LLE:  Splint intact  Motor: TA/EHL/FHL intact  Sensory: DP/SP/Tib/Radha/Saph SILT  Toes WWP        LABS                        11.7   7.53  )-----------( 335      ( 27 Jun 2025 00:45 )             33.7       06-27    136  |  102  |  29[H]  ----------------------------<  167[H]  4.3   |  20[L]  |  0.74    Ca    9.0      27 Jun 2025 00:45  Phos  3.4     06-26  Mg     2.40     06-26        PT/INR - ( 27 Jun 2025 00:45 )   PT: 10.4 sec;   INR: 0.90 ratio         PTT - ( 27 Jun 2025 00:45 )  PTT:27.5 sec    I&O's Summary    26 Jun 2025 07:01  -  27 Jun 2025 07:00  --------------------------------------------------------  IN: 800 mL / OUT: 900 mL / NET: -100 mL        acetaminophen     Tablet .. 650 milliGRAM(s) Oral every 6 hours PRN  aluminum hydroxide/magnesium hydroxide/simethicone Suspension 30 milliLiter(s) Oral every 4 hours PRN  artificial tears (preservative free) Ophthalmic Solution 1 Drop(s) Both EYES two times a day PRN  atorvastatin 20 milliGRAM(s) Oral at bedtime  calcitriol   Capsule 0.25 MICROGram(s) Oral daily  chlorhexidine 2% Cloths 1 Application(s) Topical daily  dextrose 5%. 1000 milliLiter(s) IV Continuous <Continuous>  dextrose 5%. 1000 milliLiter(s) IV Continuous <Continuous>  dextrose 50% Injectable 25 Gram(s) IV Push once  dextrose 50% Injectable 12.5 Gram(s) IV Push once  dextrose 50% Injectable 25 Gram(s) IV Push once  dextrose Oral Gel 15 Gram(s) Oral once PRN  glucagon  Injectable 1 milliGRAM(s) IntraMuscular once  insulin lispro (ADMELOG) corrective regimen sliding scale   SubCutaneous three times a day before meals  insulin lispro (ADMELOG) corrective regimen sliding scale   SubCutaneous at bedtime  isosorbide   mononitrate ER Tablet (IMDUR) 30 milliGRAM(s) Oral daily  melatonin 3 milliGRAM(s) Oral at bedtime PRN  metoprolol succinate ER 25 milliGRAM(s) Oral daily  mupirocin 2% Ointment 1 Application(s) Both Nostrils two times a day  Nephro-cassidy 1 Tablet(s) Oral daily  NIFEdipine XL 60 milliGRAM(s) Oral daily  ondansetron Injectable 4 milliGRAM(s) IV Push every 8 hours PRN  oxyCODONE    IR 2.5 milliGRAM(s) Oral every 6 hours PRN  oxyCODONE    IR 5 milliGRAM(s) Oral every 6 hours PRN  polyethylene glycol 3350 17 Gram(s) Oral daily  povidone iodine 10% Nasal Swab 1 Application(s) Both Nostrils once  senna 2 Tablet(s) Oral at bedtime

## 2025-06-27 NOTE — PROGRESS NOTE ADULT - ASSESSMENT
84yFemale w/ L trilam ankle fx with lateral dislocation s/p closed reduction and immobilization. Surgery discussed with patient and daughter. They would like to have her ankle surgery during this admission before dc to [facility].     -OR today  -medically optimized, appreciate recs  -NPO   -plavix held  -NWB LLE in a short-leg trilam splint  -splint precautions  -pain control prn  -ice/cold compress, elevation  -PT/OT maikel Berumen PGY1  Orthopedic Surgery  [facility] [id number]  [facility]        [id number]  [facility]  [id number]/[phone]

## 2025-06-27 NOTE — DIETITIAN INITIAL EVALUATION ADULT - REASON FOR ADMISSION
Fall with left ankle fracture  Per chart, Pt is 83 y/o F with PMHx of HTN, DM2, CVA with residual left-sided deficits, recurrent UTIs, HLD, CKD stage II, recent L4 vertebral augmentation (06/11/25) presented to the ED due to mechanical fall with left ankle deformity. XR left foot demonstrated acute displaced fractures of the left lateral and medial malleoli with associated lateral tibiotalar joint dislocation. S/p closed reduction and immobilization with placement of splint by Orthopedic surgery. She will be admitted for pain management and PT evaluation.

## 2025-06-27 NOTE — DIETITIAN INITIAL EVALUATION ADULT - PERTINENT MEDS FT
MEDICATIONS  (STANDING):  atorvastatin 20 milliGRAM(s) Oral at bedtime  calcitriol   Capsule 0.25 MICROGram(s) Oral daily  chlorhexidine 2% Cloths 1 Application(s) Topical daily  dextrose 5%. 1000 milliLiter(s) (50 mL/Hr) IV Continuous <Continuous>  dextrose 5%. 1000 milliLiter(s) (100 mL/Hr) IV Continuous <Continuous>  dextrose 50% Injectable 25 Gram(s) IV Push once  dextrose 50% Injectable 12.5 Gram(s) IV Push once  dextrose 50% Injectable 25 Gram(s) IV Push once  glucagon  Injectable 1 milliGRAM(s) IntraMuscular once  insulin lispro (ADMELOG) corrective regimen sliding scale   SubCutaneous three times a day before meals  insulin lispro (ADMELOG) corrective regimen sliding scale   SubCutaneous at bedtime  isosorbide   mononitrate ER Tablet (IMDUR) 30 milliGRAM(s) Oral daily  metoprolol succinate ER 25 milliGRAM(s) Oral daily  mupirocin 2% Ointment 1 Application(s) Both Nostrils two times a day  Nephro-cassidy 1 Tablet(s) Oral daily  NIFEdipine XL 60 milliGRAM(s) Oral daily  polyethylene glycol 3350 17 Gram(s) Oral daily  povidone iodine 10% Nasal Swab 1 Application(s) Both Nostrils once  senna 2 Tablet(s) Oral at bedtime    MEDICATIONS  (PRN):  acetaminophen     Tablet .. 650 milliGRAM(s) Oral every 6 hours PRN Temp greater or equal to 38C (100.4F), Mild Pain (1 - 3)  aluminum hydroxide/magnesium hydroxide/simethicone Suspension 30 milliLiter(s) Oral every 4 hours PRN Dyspepsia  artificial tears (preservative free) Ophthalmic Solution 1 Drop(s) Both EYES two times a day PRN Dry Eyes  dextrose Oral Gel 15 Gram(s) Oral once PRN Blood Glucose LESS THAN 70 milliGRAM(s)/deciliter  melatonin 3 milliGRAM(s) Oral at bedtime PRN Insomnia  ondansetron Injectable 4 milliGRAM(s) IV Push every 8 hours PRN Nausea and/or Vomiting  oxyCODONE    IR 2.5 milliGRAM(s) Oral every 6 hours PRN Moderate Pain (4 - 6)  oxyCODONE    IR 5 milliGRAM(s) Oral every 6 hours PRN Severe Pain (7 - 10)

## 2025-06-27 NOTE — PROGRESS NOTE ADULT - PROBLEM SELECTOR PLAN 9
DVT prophylaxis: Lovenox on hold for Surgery today  Diet: Consistent carb   Ethics: Full code  Disposition: PT recommended rehab. Pt accepted at [facility], pending val. Pt and her daughter would like to have her ankle surgery during this admission before d/c to [facility]. Ankle surgery is scheduled for today, 06/27/2025. Will f/u with  for auth on Monday after surgery is completed.

## 2025-06-27 NOTE — DIETITIAN INITIAL EVALUATION ADULT - PERTINENT LABORATORY DATA
06-27    136  |  102  |  29[H]  ----------------------------<  167[H]  4.3   |  20[L]  |  0.74    Ca    9.0      27 Jun 2025 00:45  Phos  3.4     06-26  Mg     2.40     06-26    CAPILLARY BLOOD GLUCOSE  POCT Blood Glucose.: 186 mg/dL (27 Jun 2025 08:49)  POCT Blood Glucose.: 148 mg/dL (27 Jun 2025 03:17)  POCT Blood Glucose.: 234 mg/dL (26 Jun 2025 21:33)  POCT Blood Glucose.: 145 mg/dL (26 Jun 2025 17:48)    A1C with Estimated Average Glucose Result: 6.8 % (06-23-25 @ 06:23)  A1C with Estimated Average Glucose Result: 6.3 % (04-13-25 @ 05:35)

## 2025-06-27 NOTE — PROGRESS NOTE ADULT - SUBJECTIVE AND OBJECTIVE BOX
Pt seen/examined. Doing well. Pain controlled. No acute overnight complaints or events.    T(C): 36.7 (06-27-25 @ 16:18), Max: 37.2 (06-27-25 @ 15:35)  HR: 86 (06-27-25 @ 16:18) (68 - 86)  BP: 143/74 (06-27-25 @ 16:18) (143/74 - 175/70)  RR: 16 (06-27-25 @ 16:18) (16 - 18)  SpO2: 100% (06-27-25 @ 16:18) (100% - 100%)  Wt(kg): --  - Gen: NAD    PE  Gen: NAD, alert and oriented  Resp: Unlabored breathing  LLE: Skin intact, no ecchymosis, cast c/d/i       SILT, +EHL/FHL        DP+,        soft compartments, no calf ttp       84yFemale s/p L ankle ORIF in [facility] 6/27/25    - NWB LLE in [facility] w strict elevation  - Pain control  - Mechanical/DVT ppx, ASA 81qD  - Restart plavix tomorrow  - OOB/PT/OT   Pt seen/examined. Doing well. Pain controlled. No acute overnight complaints or events.    T(C): 36.7 (06-27-25 @ 16:18), Max: 37.2 (06-27-25 @ 15:35)  HR: 86 (06-27-25 @ 16:18) (68 - 86)  BP: 143/74 (06-27-25 @ 16:18) (143/74 - 175/70)  RR: 16 (06-27-25 @ 16:18) (16 - 18)  SpO2: 100% (06-27-25 @ 16:18) (100% - 100%)  Wt(kg): --  - Gen: NAD    PE  Gen: NAD, alert and oriented  Resp: Unlabored breathing  LLE: Cast c/d/i       SILT, +EHL/FHL        DP+,        soft compartments, no calf ttp       84yFemale s/p L ankle ORIF in [facility] 6/27/25    - NWB LLE in [facility] w strict elevation  - Pain control  - Mechanical/DVT ppx, ASA 81qD  - Restart plavix tomorrow  - OOB/PT/OT   Pt seen/examined. Doing well. Pain controlled. No acute overnight complaints or events.    T(C): 36.7 (06-27-25 @ 16:18), Max: 37.2 (06-27-25 @ 15:35)  HR: 86 (06-27-25 @ 16:18) (68 - 86)  BP: 143/74 (06-27-25 @ 16:18) (143/74 - 175/70)  RR: 16 (06-27-25 @ 16:18) (16 - 18)  SpO2: 100% (06-27-25 @ 16:18) (100% - 100%)  Wt(kg): --  - Gen: NAD    PE  Gen: NAD, alert and oriented  Resp: Unlabored breathing  LLE: Cast c/d/i       Unable to assess d/t block, +EHL/FHL        DP+,        soft compartments, no calf ttp       84yFemale s/p L ankle ORIF in [facility] 6/27/25    - NWB LLE in [facility] w strict elevation  - Pain control  - Mechanical/DVT ppx, ASA 81qD  - Restart plavix tomorrow  - OOB/PT/OT   Pt seen/examined. Doing well. Pain controlled.     T(C): 36.7 (06-27-25 @ 16:18), Max: 37.2 (06-27-25 @ 15:35)  HR: 86 (06-27-25 @ 16:18) (68 - 86)  BP: 143/74 (06-27-25 @ 16:18) (143/74 - 175/70)  RR: 16 (06-27-25 @ 16:18) (16 - 18)  SpO2: 100% (06-27-25 @ 16:18) (100% - 100%)  Wt(kg): --  - Gen: NAD    PE  Gen: NAD, alert and oriented  Resp: Unlabored breathing  LLE: Cast c/d/i       Unable to assess d/t block, +EHL/FHL        DP+,        soft compartments, no calf ttp       84yFemale s/p L ankle ORIF in [facility] 6/27/25    - NWB LLE in [facility] w strict elevation  - Pain control  - Mechanical/DVT ppx, ASA 81qD  - Restart plavix tomorrow  - OOB/PT/OT

## 2025-06-27 NOTE — DIETITIAN INITIAL EVALUATION ADULT - OTHER INFO
Pt seen at bedside with daughter present. Pt speaks Mandarin, offered translation services Pt's daughter prefers to translate for patient. At time of visit Pt is NPO pending procedure. Endorsed fair appetite/PO intake; consuming ~75% of meals in-house. Per nursing documentation, pt consuming %. Pt wears upper dentures. Pt receptive to having oral nutritional supplement to enhance PO intakes and optimize nutritional status. Encouraged PO intakes. Food preferences obtained and noted. Patient denies difficulty chewing or swallowing at present. No GI distress noted at present; fecal incontinence with last BM today 6/27. Bilateral foot edema 1+, no PI/DTI noted, per RN flowsheet. Labs noted for hyperglycemia. Pt and daughter educated on Consistent Carbohydrate diet understanding. RDN answered questions/concerns related to diet. RDN remains available and will follow-up per protocol.

## 2025-06-27 NOTE — DIETITIAN INITIAL EVALUATION ADULT - PROBLEM SELECTOR PLAN 2
PRINCIPAL DISCHARGE DIAGNOSIS  Diagnosis: Tachyarrhythmia  Assessment and Plan of Treatment: Please continue taking all medications as prescribed. You will need to complete 1 more day of amiodarone medication taking 2 pills two times a day. Then starting on 6/25, you will take one pill two times a day of amiodarone. We are sending you on 2 new medications, entresto and metoprolol. Please take them as prescribed without missing a day.      SECONDARY DISCHARGE DIAGNOSES  Diagnosis: HLD (hyperlipidemia)  Assessment and Plan of Treatment: Follow a low fat diet. Continue taking your cholesterol medications as prescribed. Follow up with your Primary Care Doctor to continue having your cholesterol levels checked on a continual basis.      Diagnosis: HTN (hypertension)  Assessment and Plan of Treatment: Be sure to follow a low salt diet. If you have been prescribed antihypertensive medications to control your blood pressure, be sure to take them every day as prescribed and do not miss any doses, the medications do not work if they are not taken consistently. Follow up with your Primary Care Doctor and have your Blood Pressure checked.      Diagnosis: Heart failure  Assessment and Plan of Treatment: Continue all medications as prescribed. Please use the life vest as indicated. Be sure to follow up with your Primary Care Doctor and Cardiologist. Follow a low salt diet. Check your weight regularly, if you find that you have suddenly gained a large amount of weight, are having trouble breathing, can no longer lay flat on your back, or that you are becoming swollen (ex. swollen legs), be sure to contact your doctor or come to the ER.
Home medications: Nifedipine 60 mg daily, metoprolol succinate 25 mg daily and Imdur ER 30 mg daily     Nifedipine 60 mg daily   Metoprolol succinate 25 mg daily   Imdur ER 30 mg daily   Monitor BP and adjust accordingly

## 2025-06-27 NOTE — DIETITIAN INITIAL EVALUATION ADULT - PROBLEM SELECTOR PLAN 1
Presented to the ED s/p mechanical fall while trying to get to bedside commode  Was unable to bear weight on affected LLE due to the pain   XR left foot demonstrated acute displaced fractures of the left lateral and medial malleoli with associated lateral tibiotalar joint dislocation  Prelim CT left ankle demonstrated acute comminuted mildly displaced trimalleolar fractures  Orthopedic surgery consulted and recommended to follow up as outpatient and surgical intervention at a later date  Underwent closed reduction and immobilization in the ED   XR Left ankle after orthopedic intervention demonstrated s/p casting and reduction of acute displaced fractures of the lateral and medial malleoli and distal fibular metadiaphysis with improved alignment    Tylenol PRN for mild pain   Oxycodone 2.5 mg q 6 PRN for moderate pain   Oxycodone 5 mg q 6 PRN for severe pain   PT with NWB LLE in short leg trilam splint  PT/OT as tolerated   Orthopedic recommendations appreciated

## 2025-06-28 LAB
ANION GAP SERPL CALC-SCNC: 14 MMOL/L — SIGNIFICANT CHANGE UP (ref 7–14)
BASOPHILS # BLD AUTO: 0.03 K/UL — SIGNIFICANT CHANGE UP (ref 0–0.2)
BASOPHILS NFR BLD AUTO: 0.4 % — SIGNIFICANT CHANGE UP (ref 0–2)
BUN SERPL-MCNC: 37 MG/DL — HIGH (ref 7–23)
CALCIUM SERPL-MCNC: 8.6 MG/DL — SIGNIFICANT CHANGE UP (ref 8.4–10.5)
CHLORIDE SERPL-SCNC: 103 MMOL/L — SIGNIFICANT CHANGE UP (ref 98–107)
CO2 SERPL-SCNC: 19 MMOL/L — LOW (ref 22–31)
CREAT SERPL-MCNC: 0.89 MG/DL — SIGNIFICANT CHANGE UP (ref 0.5–1.3)
EGFR: 65 ML/MIN/1.73M2 — SIGNIFICANT CHANGE UP
EGFR: 65 ML/MIN/1.73M2 — SIGNIFICANT CHANGE UP
EOSINOPHIL # BLD AUTO: 0 K/UL — SIGNIFICANT CHANGE UP (ref 0–0.5)
EOSINOPHIL NFR BLD AUTO: 0 % — SIGNIFICANT CHANGE UP (ref 0–6)
GLUCOSE BLDC GLUCOMTR-MCNC: 212 MG/DL — HIGH (ref 70–99)
GLUCOSE BLDC GLUCOMTR-MCNC: 222 MG/DL — HIGH (ref 70–99)
GLUCOSE BLDC GLUCOMTR-MCNC: 225 MG/DL — HIGH (ref 70–99)
GLUCOSE BLDC GLUCOMTR-MCNC: 242 MG/DL — HIGH (ref 70–99)
GLUCOSE BLDC GLUCOMTR-MCNC: 253 MG/DL — HIGH (ref 70–99)
GLUCOSE SERPL-MCNC: 258 MG/DL — HIGH (ref 70–99)
HCT VFR BLD CALC: 35 % — SIGNIFICANT CHANGE UP (ref 34.5–45)
HGB BLD-MCNC: 11.5 G/DL — SIGNIFICANT CHANGE UP (ref 11.5–15.5)
IMM GRANULOCYTES # BLD AUTO: 0.07 K/UL — SIGNIFICANT CHANGE UP (ref 0–0.07)
IMM GRANULOCYTES NFR BLD AUTO: 0.9 % — SIGNIFICANT CHANGE UP (ref 0–0.9)
LYMPHOCYTES # BLD AUTO: 1.27 K/UL — SIGNIFICANT CHANGE UP (ref 1–3.3)
LYMPHOCYTES NFR BLD AUTO: 17 % — SIGNIFICANT CHANGE UP (ref 13–44)
MAGNESIUM SERPL-MCNC: 2.4 MG/DL — SIGNIFICANT CHANGE UP (ref 1.6–2.6)
MCHC RBC-ENTMCNC: 30.7 PG — SIGNIFICANT CHANGE UP (ref 27–34)
MCHC RBC-ENTMCNC: 32.9 G/DL — SIGNIFICANT CHANGE UP (ref 32–36)
MCV RBC AUTO: 93.6 FL — SIGNIFICANT CHANGE UP (ref 80–100)
MONOCYTES # BLD AUTO: 0.29 K/UL — SIGNIFICANT CHANGE UP (ref 0–0.9)
MONOCYTES NFR BLD AUTO: 3.9 % — SIGNIFICANT CHANGE UP (ref 2–14)
NEUTROPHILS # BLD AUTO: 5.83 K/UL — SIGNIFICANT CHANGE UP (ref 1.8–7.4)
NEUTROPHILS NFR BLD AUTO: 77.8 % — HIGH (ref 43–77)
NRBC # BLD AUTO: 0 K/UL — SIGNIFICANT CHANGE UP (ref 0–0)
NRBC # FLD: 0 K/UL — SIGNIFICANT CHANGE UP (ref 0–0)
NRBC BLD AUTO-RTO: 0 /100 WBCS — SIGNIFICANT CHANGE UP (ref 0–0)
PHOSPHATE SERPL-MCNC: 4.2 MG/DL — SIGNIFICANT CHANGE UP (ref 2.5–4.5)
PLATELET # BLD AUTO: 330 K/UL — SIGNIFICANT CHANGE UP (ref 150–400)
PMV BLD: 10 FL — SIGNIFICANT CHANGE UP (ref 7–13)
POTASSIUM SERPL-MCNC: 5 MMOL/L — SIGNIFICANT CHANGE UP (ref 3.5–5.3)
POTASSIUM SERPL-SCNC: 5 MMOL/L — SIGNIFICANT CHANGE UP (ref 3.5–5.3)
RBC # BLD: 3.74 M/UL — LOW (ref 3.8–5.2)
RBC # FLD: 13.8 % — SIGNIFICANT CHANGE UP (ref 10.3–14.5)
SODIUM SERPL-SCNC: 136 MMOL/L — SIGNIFICANT CHANGE UP (ref 135–145)
WBC # BLD: 7.49 K/UL — SIGNIFICANT CHANGE UP (ref 3.8–10.5)
WBC # FLD AUTO: 7.49 K/UL — SIGNIFICANT CHANGE UP (ref 3.8–10.5)

## 2025-06-28 PROCEDURE — 99233 SBSQ HOSP IP/OBS HIGH 50: CPT

## 2025-06-28 RX ORDER — CEFAZOLIN SODIUM IN 0.9 % NACL 3 G/100 ML
2000 INTRAVENOUS SOLUTION, PIGGYBACK (ML) INTRAVENOUS ONCE
Refills: 0 | Status: COMPLETED | OUTPATIENT
Start: 2025-06-28 | End: 2025-06-28

## 2025-06-28 RX ADMIN — INSULIN LISPRO 2: 100 INJECTION, SOLUTION INTRAVENOUS; SUBCUTANEOUS at 18:10

## 2025-06-28 RX ADMIN — INSULIN LISPRO 3: 100 INJECTION, SOLUTION INTRAVENOUS; SUBCUTANEOUS at 12:43

## 2025-06-28 RX ADMIN — Medication 1 APPLICATION(S): at 12:02

## 2025-06-28 RX ADMIN — Medication 1 TABLET(S): at 12:42

## 2025-06-28 RX ADMIN — Medication 2 TABLET(S): at 21:55

## 2025-06-28 RX ADMIN — Medication 100 MILLIGRAM(S): at 12:48

## 2025-06-28 RX ADMIN — Medication 60 MILLIGRAM(S): at 06:50

## 2025-06-28 RX ADMIN — Medication 100 MILLIGRAM(S): at 03:16

## 2025-06-28 RX ADMIN — INSULIN LISPRO 2: 100 INJECTION, SOLUTION INTRAVENOUS; SUBCUTANEOUS at 08:56

## 2025-06-28 RX ADMIN — ATORVASTATIN CALCIUM 20 MILLIGRAM(S): 80 TABLET, FILM COATED ORAL at 21:55

## 2025-06-28 RX ADMIN — MUPIROCIN CALCIUM 1 APPLICATION(S): 20 CREAM TOPICAL at 07:19

## 2025-06-28 RX ADMIN — Medication 81 MILLIGRAM(S): at 12:05

## 2025-06-28 RX ADMIN — Medication 3 MILLIGRAM(S): at 21:55

## 2025-06-28 RX ADMIN — METOPROLOL SUCCINATE 25 MILLIGRAM(S): 50 TABLET, EXTENDED RELEASE ORAL at 06:50

## 2025-06-28 RX ADMIN — MUPIROCIN CALCIUM 1 APPLICATION(S): 20 CREAM TOPICAL at 17:24

## 2025-06-28 RX ADMIN — CALCITRIOL 0.25 MICROGRAM(S): 0.5 CAPSULE, GELATIN COATED ORAL at 12:05

## 2025-06-28 RX ADMIN — ISOSORBIDE MONONITRATE 30 MILLIGRAM(S): 60 TABLET, EXTENDED RELEASE ORAL at 12:05

## 2025-06-28 RX ADMIN — CLOPIDOGREL BISULFATE 75 MILLIGRAM(S): 75 TABLET, FILM COATED ORAL at 12:05

## 2025-06-28 NOTE — PROGRESS NOTE ADULT - ASSESSMENT
84yFemale s/p L ankle ORIF in [facility] 6/27/25    - NWB LLE in [facility] w strict elevation  - Pain control  - Mechanical/DVT ppx, ASA 81qD  - Restart plavix today  - OOB/PT/OT      Rena Lindsay, PGY-2  Orthopedic Surgery    [facility] [id number]  [facility]: [id number]  [facility]:  [id number]/ [phone]

## 2025-06-28 NOTE — PROGRESS NOTE ADULT - SUBJECTIVE AND OBJECTIVE BOX
Orthopedic Surgery Progress Note     S: Patient seen and examined today. No acute events overnight. Pain is well controlled. Denies f/c, chest pain, shortness of breath, dizziness.    MEDICATIONS  (STANDING):  aspirin enteric coated 81 milliGRAM(s) Oral daily  atorvastatin 20 milliGRAM(s) Oral at bedtime  calcitriol   Capsule 0.25 MICROGram(s) Oral daily  ceFAZolin   IVPB 2000 milliGRAM(s) IV Intermittent every 8 hours  chlorhexidine 2% Cloths 1 Application(s) Topical daily  clopidogrel Tablet 75 milliGRAM(s) Oral daily  dextrose 5%. 1000 milliLiter(s) (100 mL/Hr) IV Continuous <Continuous>  dextrose 5%. 1000 milliLiter(s) (50 mL/Hr) IV Continuous <Continuous>  dextrose 50% Injectable 25 Gram(s) IV Push once  dextrose 50% Injectable 12.5 Gram(s) IV Push once  dextrose 50% Injectable 25 Gram(s) IV Push once  glucagon  Injectable 1 milliGRAM(s) IntraMuscular once  insulin lispro (ADMELOG) corrective regimen sliding scale   SubCutaneous three times a day before meals  insulin lispro (ADMELOG) corrective regimen sliding scale   SubCutaneous at bedtime  isosorbide   mononitrate ER Tablet (IMDUR) 30 milliGRAM(s) Oral daily  metoprolol succinate ER 25 milliGRAM(s) Oral daily  mupirocin 2% Ointment 1 Application(s) Both Nostrils two times a day  Nephro-cassidy 1 Tablet(s) Oral daily  NIFEdipine XL 60 milliGRAM(s) Oral daily  polyethylene glycol 3350 17 Gram(s) Oral daily  senna 2 Tablet(s) Oral at bedtime    MEDICATIONS  (PRN):  acetaminophen     Tablet .. 650 milliGRAM(s) Oral every 6 hours PRN Temp greater or equal to 38C (100.4F), Mild Pain (1 - 3)  aluminum hydroxide/magnesium hydroxide/simethicone Suspension 30 milliLiter(s) Oral every 4 hours PRN Dyspepsia  artificial tears (preservative free) Ophthalmic Solution 1 Drop(s) Both EYES two times a day PRN Dry Eyes  dextrose Oral Gel 15 Gram(s) Oral once PRN Blood Glucose LESS THAN 70 milliGRAM(s)/deciliter  melatonin 3 milliGRAM(s) Oral at bedtime PRN Insomnia  ondansetron Injectable 4 milliGRAM(s) IV Push every 8 hours PRN Nausea and/or Vomiting  oxyCODONE    IR 2.5 milliGRAM(s) Oral every 6 hours PRN Moderate Pain (4 - 6)  oxyCODONE    IR 5 milliGRAM(s) Oral every 6 hours PRN Severe Pain (7 - 10)      Vital Signs Last 24 Hrs  T(C): 36.7 (28 Jun 2025 06:18), Max: 37.2 (27 Jun 2025 15:35)  T(F): 98 (28 Jun 2025 06:18), Max: 99 (27 Jun 2025 15:35)  HR: 88 (28 Jun 2025 06:18) (73 - 88)  BP: 150/87 (28 Jun 2025 06:18) (122/58 - 171/69)  BP(mean): 90 (28 Jun 2025 00:00) (75 - 107)  RR: 16 (28 Jun 2025 06:18) (13 - 27)  SpO2: 100% (28 Jun 2025 06:18) (98% - 100%)    Parameters below as of 28 Jun 2025 06:18  Patient On (Oxygen Delivery Method): room air        06-27-25 @ 07:01  -  06-28-25 @ 07:00  --------------------------------------------------------  IN: 50 mL / OUT: 250 mL / NET: -200 mL        Physical Exam:  Gen: NAD  LLE:  in short leg cast  wiggling toes  toes wwp  SILT toes    LABS:                        11.5   7.49  )-----------( 330      ( 28 Jun 2025 06:30 )             35.0     06-28    136  |  103  |  37[H]  ----------------------------<  258[H]  5.0   |  19[L]  |  0.89    Ca    8.6      28 Jun 2025 06:30  Phos  4.2     06-28  Mg     2.40     06-28

## 2025-06-28 NOTE — PACU DISCHARGE NOTE - NSPTMEETSDISCHCRITERIADT_GEN_A_CORE
28-Jun-2025 00:09
Alert-The patient is alert, awake and responds to voice. The patient is oriented to time, place, and person. The triage nurse is able to obtain subjective information.

## 2025-06-28 NOTE — PROGRESS NOTE ADULT - SUBJECTIVE AND OBJECTIVE BOX
Division of Hospital Medicine  Sebastián Mora MD  Available via MS Teams    SUBJECTIVE / OVERNIGHT EVENTS: No active issues overnight; pt denies any new symptoms    MEDICATIONS  (STANDING):  aspirin enteric coated 81 milliGRAM(s) Oral daily  atorvastatin 20 milliGRAM(s) Oral at bedtime  calcitriol   Capsule 0.25 MICROGram(s) Oral daily  chlorhexidine 2% Cloths 1 Application(s) Topical daily  clopidogrel Tablet 75 milliGRAM(s) Oral daily  dextrose 5%. 1000 milliLiter(s) (100 mL/Hr) IV Continuous <Continuous>  dextrose 5%. 1000 milliLiter(s) (50 mL/Hr) IV Continuous <Continuous>  dextrose 50% Injectable 25 Gram(s) IV Push once  dextrose 50% Injectable 12.5 Gram(s) IV Push once  dextrose 50% Injectable 25 Gram(s) IV Push once  glucagon  Injectable 1 milliGRAM(s) IntraMuscular once  insulin lispro (ADMELOG) corrective regimen sliding scale   SubCutaneous three times a day before meals  insulin lispro (ADMELOG) corrective regimen sliding scale   SubCutaneous at bedtime  isosorbide   mononitrate ER Tablet (IMDUR) 30 milliGRAM(s) Oral daily  metoprolol succinate ER 25 milliGRAM(s) Oral daily  mupirocin 2% Ointment 1 Application(s) Both Nostrils two times a day  Nephro-cassidy 1 Tablet(s) Oral daily  NIFEdipine XL 60 milliGRAM(s) Oral daily  polyethylene glycol 3350 17 Gram(s) Oral daily  senna 2 Tablet(s) Oral at bedtime    MEDICATIONS  (PRN):  acetaminophen     Tablet .. 650 milliGRAM(s) Oral every 6 hours PRN Temp greater or equal to 38C (100.4F), Mild Pain (1 - 3)  aluminum hydroxide/magnesium hydroxide/simethicone Suspension 30 milliLiter(s) Oral every 4 hours PRN Dyspepsia  artificial tears (preservative free) Ophthalmic Solution 1 Drop(s) Both EYES two times a day PRN Dry Eyes  dextrose Oral Gel 15 Gram(s) Oral once PRN Blood Glucose LESS THAN 70 milliGRAM(s)/deciliter  melatonin 3 milliGRAM(s) Oral at bedtime PRN Insomnia  ondansetron Injectable 4 milliGRAM(s) IV Push every 8 hours PRN Nausea and/or Vomiting  oxyCODONE    IR 2.5 milliGRAM(s) Oral every 6 hours PRN Moderate Pain (4 - 6)  oxyCODONE    IR 5 milliGRAM(s) Oral every 6 hours PRN Severe Pain (7 - 10)      I&O's Summary    27 Jun 2025 07:01  -  28 Jun 2025 07:00  --------------------------------------------------------  IN: 50 mL / OUT: 250 mL / NET: -200 mL        PHYSICAL EXAM:  Vital Signs Last 24 Hrs  T(C): 36.7 (28 Jun 2025 06:18), Max: 37.2 (27 Jun 2025 15:35)  T(F): 98 (28 Jun 2025 06:18), Max: 99 (27 Jun 2025 15:35)  HR: 88 (28 Jun 2025 06:18) (73 - 88)  BP: 150/87 (28 Jun 2025 06:18) (122/58 - 171/69)  BP(mean): 90 (28 Jun 2025 00:00) (75 - 107)  RR: 16 (28 Jun 2025 06:18) (13 - 27)  SpO2: 100% (28 Jun 2025 06:18) (98% - 100%)    Parameters below as of 28 Jun 2025 06:18  Patient On (Oxygen Delivery Method): room air      CONSTITUTIONAL: NAD  EYES: PERRLA; conjunctiva and sclera clear  ENMT: Moist oral mucosa, no pharyngeal injection or exudates; normal dentition  NECK: Supple, no palpable masses; no thyromegaly  RESPIRATORY: Normal respiratory effort; lungs are clear to auscultation bilaterally; no rales, rhonchi, or wheezing  CARDIOVASCULAR: Regular rate and rhythm, normal S1 and S2, no murmur/rub/gallop; Peripheral pulses are 2+ bilaterally  ABDOMEN: Nontender to palpation, normoactive bowel sounds, no rebound/guarding; No hepatosplenomegaly  MUSCULOSKELETAL: no clubbing or cyanosis of digits; +LLE splint  NEUROLOGY: Awake and alert to person, place; no focal neurological deficits   SKIN: No rashes; no palpable lesions    LABS:                        11.5   7.49  )-----------( 330      ( 28 Jun 2025 06:30 )             35.0     06-28    136  |  103  |  37[H]  ----------------------------<  258[H]  5.0   |  19[L]  |  0.89    Ca    8.6      28 Jun 2025 06:30  Phos  4.2     06-28  Mg     2.40     06-28      PT/INR - ( 27 Jun 2025 00:45 )   PT: 10.4 sec;   INR: 0.90 ratio         PTT - ( 27 Jun 2025 00:45 )  PTT:27.5 sec      Urinalysis Basic - ( 28 Jun 2025 06:30 )    Color: x / Appearance: x / SG: x / pH: x  Gluc: 258 mg/dL / Ketone: x  / Bili: x / Urobili: x   Blood: x / Protein: x / Nitrite: x   Leuk Esterase: x / RBC: x / WBC x   Sq Epi: x / Non Sq Epi: x / Bacteria: x            Imaging and consultant notes reviewed.

## 2025-06-28 NOTE — PROGRESS NOTE ADULT - PROBLEM SELECTOR PLAN 9
DVT prophylaxis: Lovenox on hold, to be resumed as per Ortho recs   Diet: Consistent carb   Ethics: Full code  Disposition: PT recommended rehab. Pt accepted at [facility], pending auth. F/u with SW for auth on Monday. DVT prophylaxis: Aspirin and Plavix (As per Ortho recs, do NOT add lovenox due to increased risk for bleeding)  Diet: Consistent carb   Ethics: Full code  Disposition: PT recommended rehab. Pt accepted at [facility], pending auth. F/u with SW for auth on Monday.

## 2025-06-29 LAB
ANION GAP SERPL CALC-SCNC: 13 MMOL/L — SIGNIFICANT CHANGE UP (ref 7–14)
ANION GAP SERPL CALC-SCNC: 15 MMOL/L — HIGH (ref 7–14)
BUN SERPL-MCNC: 28 MG/DL — HIGH (ref 7–23)
BUN SERPL-MCNC: 30 MG/DL — HIGH (ref 7–23)
CALCIUM SERPL-MCNC: 8.4 MG/DL — SIGNIFICANT CHANGE UP (ref 8.4–10.5)
CALCIUM SERPL-MCNC: 8.4 MG/DL — SIGNIFICANT CHANGE UP (ref 8.4–10.5)
CHLORIDE SERPL-SCNC: 101 MMOL/L — SIGNIFICANT CHANGE UP (ref 98–107)
CHLORIDE SERPL-SCNC: 104 MMOL/L — SIGNIFICANT CHANGE UP (ref 98–107)
CO2 SERPL-SCNC: 17 MMOL/L — LOW (ref 22–31)
CO2 SERPL-SCNC: 20 MMOL/L — LOW (ref 22–31)
CREAT SERPL-MCNC: 0.81 MG/DL — SIGNIFICANT CHANGE UP (ref 0.5–1.3)
CREAT SERPL-MCNC: 0.85 MG/DL — SIGNIFICANT CHANGE UP (ref 0.5–1.3)
EGFR: 68 ML/MIN/1.73M2 — SIGNIFICANT CHANGE UP
EGFR: 68 ML/MIN/1.73M2 — SIGNIFICANT CHANGE UP
EGFR: 72 ML/MIN/1.73M2 — SIGNIFICANT CHANGE UP
EGFR: 72 ML/MIN/1.73M2 — SIGNIFICANT CHANGE UP
GLUCOSE BLDC GLUCOMTR-MCNC: 213 MG/DL — HIGH (ref 70–99)
GLUCOSE BLDC GLUCOMTR-MCNC: 224 MG/DL — HIGH (ref 70–99)
GLUCOSE BLDC GLUCOMTR-MCNC: 240 MG/DL — HIGH (ref 70–99)
GLUCOSE BLDC GLUCOMTR-MCNC: 281 MG/DL — HIGH (ref 70–99)
GLUCOSE SERPL-MCNC: 215 MG/DL — HIGH (ref 70–99)
GLUCOSE SERPL-MCNC: 283 MG/DL — HIGH (ref 70–99)
HCT VFR BLD CALC: 30.9 % — LOW (ref 34.5–45)
HGB BLD-MCNC: 10.5 G/DL — LOW (ref 11.5–15.5)
MAGNESIUM SERPL-MCNC: 2.4 MG/DL — SIGNIFICANT CHANGE UP (ref 1.6–2.6)
MCHC RBC-ENTMCNC: 31.3 PG — SIGNIFICANT CHANGE UP (ref 27–34)
MCHC RBC-ENTMCNC: 34 G/DL — SIGNIFICANT CHANGE UP (ref 32–36)
MCV RBC AUTO: 92 FL — SIGNIFICANT CHANGE UP (ref 80–100)
NRBC # BLD AUTO: 0 K/UL — SIGNIFICANT CHANGE UP (ref 0–0)
NRBC # FLD: 0 K/UL — SIGNIFICANT CHANGE UP (ref 0–0)
NRBC BLD AUTO-RTO: 0 /100 WBCS — SIGNIFICANT CHANGE UP (ref 0–0)
PHOSPHATE SERPL-MCNC: 2.3 MG/DL — LOW (ref 2.5–4.5)
PLATELET # BLD AUTO: 309 K/UL — SIGNIFICANT CHANGE UP (ref 150–400)
PMV BLD: 9.7 FL — SIGNIFICANT CHANGE UP (ref 7–13)
POTASSIUM SERPL-MCNC: 4.2 MMOL/L — SIGNIFICANT CHANGE UP (ref 3.5–5.3)
POTASSIUM SERPL-MCNC: 6.1 MMOL/L — HIGH (ref 3.5–5.3)
POTASSIUM SERPL-SCNC: 4.2 MMOL/L — SIGNIFICANT CHANGE UP (ref 3.5–5.3)
POTASSIUM SERPL-SCNC: 6.1 MMOL/L — HIGH (ref 3.5–5.3)
RBC # BLD: 3.36 M/UL — LOW (ref 3.8–5.2)
RBC # FLD: 13.6 % — SIGNIFICANT CHANGE UP (ref 10.3–14.5)
SODIUM SERPL-SCNC: 134 MMOL/L — LOW (ref 135–145)
SODIUM SERPL-SCNC: 136 MMOL/L — SIGNIFICANT CHANGE UP (ref 135–145)
WBC # BLD: 7.84 K/UL — SIGNIFICANT CHANGE UP (ref 3.8–10.5)
WBC # FLD AUTO: 7.84 K/UL — SIGNIFICANT CHANGE UP (ref 3.8–10.5)

## 2025-06-29 PROCEDURE — 99233 SBSQ HOSP IP/OBS HIGH 50: CPT

## 2025-06-29 RX ORDER — NIFEDIPINE 30 MG
60 TABLET, EXTENDED RELEASE 24 HR ORAL DAILY
Refills: 0 | Status: DISCONTINUED | OUTPATIENT
Start: 2025-06-29 | End: 2025-06-30

## 2025-06-29 RX ORDER — METOPROLOL SUCCINATE 50 MG/1
25 TABLET, EXTENDED RELEASE ORAL DAILY
Refills: 0 | Status: DISCONTINUED | OUTPATIENT
Start: 2025-06-29 | End: 2025-07-01

## 2025-06-29 RX ADMIN — INSULIN LISPRO 2: 100 INJECTION, SOLUTION INTRAVENOUS; SUBCUTANEOUS at 18:16

## 2025-06-29 RX ADMIN — METOPROLOL SUCCINATE 25 MILLIGRAM(S): 50 TABLET, EXTENDED RELEASE ORAL at 05:19

## 2025-06-29 RX ADMIN — CALCITRIOL 0.25 MICROGRAM(S): 0.5 CAPSULE, GELATIN COATED ORAL at 12:12

## 2025-06-29 RX ADMIN — OXYCODONE HYDROCHLORIDE 2.5 MILLIGRAM(S): 30 TABLET ORAL at 20:20

## 2025-06-29 RX ADMIN — Medication 60 MILLIGRAM(S): at 05:19

## 2025-06-29 RX ADMIN — ISOSORBIDE MONONITRATE 30 MILLIGRAM(S): 60 TABLET, EXTENDED RELEASE ORAL at 12:12

## 2025-06-29 RX ADMIN — Medication 81 MILLIGRAM(S): at 12:12

## 2025-06-29 RX ADMIN — OXYCODONE HYDROCHLORIDE 5 MILLIGRAM(S): 30 TABLET ORAL at 13:12

## 2025-06-29 RX ADMIN — OXYCODONE HYDROCHLORIDE 2.5 MILLIGRAM(S): 30 TABLET ORAL at 19:20

## 2025-06-29 RX ADMIN — ATORVASTATIN CALCIUM 20 MILLIGRAM(S): 80 TABLET, FILM COATED ORAL at 23:10

## 2025-06-29 RX ADMIN — CLOPIDOGREL BISULFATE 75 MILLIGRAM(S): 75 TABLET, FILM COATED ORAL at 12:12

## 2025-06-29 RX ADMIN — OXYCODONE HYDROCHLORIDE 5 MILLIGRAM(S): 30 TABLET ORAL at 04:17

## 2025-06-29 RX ADMIN — Medication 3 MILLIGRAM(S): at 23:10

## 2025-06-29 RX ADMIN — Medication 1 TABLET(S): at 12:12

## 2025-06-29 RX ADMIN — Medication 2 TABLET(S): at 23:10

## 2025-06-29 RX ADMIN — INSULIN LISPRO 2: 100 INJECTION, SOLUTION INTRAVENOUS; SUBCUTANEOUS at 09:04

## 2025-06-29 RX ADMIN — OXYCODONE HYDROCHLORIDE 5 MILLIGRAM(S): 30 TABLET ORAL at 03:17

## 2025-06-29 RX ADMIN — Medication 1 APPLICATION(S): at 12:13

## 2025-06-29 RX ADMIN — INSULIN LISPRO 3: 100 INJECTION, SOLUTION INTRAVENOUS; SUBCUTANEOUS at 12:13

## 2025-06-29 RX ADMIN — OXYCODONE HYDROCHLORIDE 5 MILLIGRAM(S): 30 TABLET ORAL at 12:12

## 2025-06-29 NOTE — PHYSICAL THERAPY INITIAL EVALUATION ADULT - ADDITIONAL COMMENTS
As per daughter at bedside pt has been non-ambulatory since last Wednesday 6/11 secondary to pain and weakness from previous procedure.     Pt left semisupine in bed in NAD, all lines intact, call bell in reach, SPO2 100%, and RN aware.
Pt lives in a private house with her daughter and son in law with 5 steps to enter; (+)handrail. Prior to hospital admission, pt was ambulating independently using either a single axis cane or rolling walker. Pt has 28 hours/week of aide services.    Pt left comfortable in bed, NAD, all lines intact, all precautions maintained, with call bell in reach, bed alarm on, and RN Ragini aware.

## 2025-06-29 NOTE — PROGRESS NOTE ADULT - PROBLEM SELECTOR PLAN 9
DVT prophylaxis: Aspirin and Plavix (Discussed with Ortho - recommended to NOT add lovenox due to increased risk for bleeding)  Diet: Consistent carb   Ethics: Full code  Disposition: PT recommended rehab. Pt accepted at [facility], pending auth. F/u with CM/ZOEY in AM.

## 2025-06-29 NOTE — PHYSICAL THERAPY INITIAL EVALUATION ADULT - GENERAL OBSERVATIONS, REHAB EVAL
Pt encountered in semisupine position, no distress,  AxOx3, with +IV, LLE in splint wrapped in ace bandage dry/ intact. Pt agreeable to participate in PT evaluation. Vitals taken; /60mmHg, heart rate 85bpm.
Pt encountered in semi-supine position in NAD, all lines intact, a&ox3, SPO2 100%, and RN aware.

## 2025-06-29 NOTE — PHYSICAL THERAPY INITIAL EVALUATION ADULT - PERTINENT HX OF CURRENT PROBLEM, REHAB EVAL
Patient is a 84 year old Female, PMH stated below, presents with left triamalleloar ankle fx with lateral dislocation s/p closed reduction and immobilization Pt is an 84 year old female with hx of HTN, DM2, CVA with residual Left-sided deficits, recurrent UTIs, HLD, CKD stage II, recent L4 vertebral augmentation (06/11/25), who presented to [facility] s/p mechanical fall with left ankle deformity. X-Ray left foot demonstrated acute displaced fractures of the left lateral and medial malleoli with associated lateral tibiotalar joint dislocation. X-Ray pelvis demonstrated no acute fracture or dislocation. Orthopedic surgery was consulted and performed closed reduction and immobilization with placement of splint. X-Ray Left ankle after orthopedic intervention demonstrated s/p casting and reduction of acute displaced fractures of the lateral and medial malleoli and distal fibular metadiaphysis with improved alignment.

## 2025-06-29 NOTE — PHYSICAL THERAPY INITIAL EVALUATION ADULT - PATIENT PROFILE REVIEW, REHAB EVAL
ACTIVITY ORDER: Ambulate as Tolerated; Spoke with RN Ragini Bansal prior to PT evaluation-->Pt OK for PT consult/OOB activity./yes
Activity Order: ambulate with assistance/yes

## 2025-06-29 NOTE — PROGRESS NOTE ADULT - SUBJECTIVE AND OBJECTIVE BOX
Division of Hospital Medicine  Sebastián Mora MD  Available via MS Teams    SUBJECTIVE / OVERNIGHT EVENTS: No active issues overnight; pt denies any new symptoms    MEDICATIONS  (STANDING):  aspirin enteric coated 81 milliGRAM(s) Oral daily  atorvastatin 20 milliGRAM(s) Oral at bedtime  calcitriol   Capsule 0.25 MICROGram(s) Oral daily  chlorhexidine 2% Cloths 1 Application(s) Topical daily  clopidogrel Tablet 75 milliGRAM(s) Oral daily  dextrose 5%. 1000 milliLiter(s) (100 mL/Hr) IV Continuous <Continuous>  dextrose 5%. 1000 milliLiter(s) (50 mL/Hr) IV Continuous <Continuous>  dextrose 50% Injectable 25 Gram(s) IV Push once  dextrose 50% Injectable 12.5 Gram(s) IV Push once  dextrose 50% Injectable 25 Gram(s) IV Push once  glucagon  Injectable 1 milliGRAM(s) IntraMuscular once  insulin lispro (ADMELOG) corrective regimen sliding scale   SubCutaneous three times a day before meals  insulin lispro (ADMELOG) corrective regimen sliding scale   SubCutaneous at bedtime  isosorbide   mononitrate ER Tablet (IMDUR) 30 milliGRAM(s) Oral daily  metoprolol succinate ER 25 milliGRAM(s) Oral daily  Nephro-cassidy 1 Tablet(s) Oral daily  NIFEdipine XL 60 milliGRAM(s) Oral daily  polyethylene glycol 3350 17 Gram(s) Oral daily  senna 2 Tablet(s) Oral at bedtime    MEDICATIONS  (PRN):  acetaminophen     Tablet .. 650 milliGRAM(s) Oral every 6 hours PRN Temp greater or equal to 38C (100.4F), Mild Pain (1 - 3)  aluminum hydroxide/magnesium hydroxide/simethicone Suspension 30 milliLiter(s) Oral every 4 hours PRN Dyspepsia  artificial tears (preservative free) Ophthalmic Solution 1 Drop(s) Both EYES two times a day PRN Dry Eyes  dextrose Oral Gel 15 Gram(s) Oral once PRN Blood Glucose LESS THAN 70 milliGRAM(s)/deciliter  melatonin 3 milliGRAM(s) Oral at bedtime PRN Insomnia  ondansetron Injectable 4 milliGRAM(s) IV Push every 8 hours PRN Nausea and/or Vomiting  oxyCODONE    IR 2.5 milliGRAM(s) Oral every 6 hours PRN Moderate Pain (4 - 6)  oxyCODONE    IR 5 milliGRAM(s) Oral every 6 hours PRN Severe Pain (7 - 10)      I&O's Summary      PHYSICAL EXAM:  Vital Signs Last 24 Hrs  T(C): 36.6 (29 Jun 2025 04:24), Max: 37.2 (28 Jun 2025 22:45)  T(F): 97.9 (29 Jun 2025 04:24), Max: 98.9 (28 Jun 2025 22:45)  HR: 84 (29 Jun 2025 04:24) (73 - 86)  BP: 145/56 (29 Jun 2025 04:24) (134/61 - 145/56)  BP(mean): --  RR: 18 (29 Jun 2025 04:24) (17 - 18)  SpO2: 98% (29 Jun 2025 04:24) (98% - 100%)    Parameters below as of 29 Jun 2025 04:24  Patient On (Oxygen Delivery Method): room air      CONSTITUTIONAL: NAD  EYES: PERRLA; conjunctiva and sclera clear  ENMT: Moist oral mucosa, no pharyngeal injection or exudates; normal dentition  NECK: Supple, no palpable masses; no thyromegaly  RESPIRATORY: Normal respiratory effort; lungs are clear to auscultation bilaterally; no rales, rhonchi, or wheezing  CARDIOVASCULAR: Regular rate and rhythm, normal S1 and S2, no murmur/rub/gallop; Peripheral pulses are 2+ bilaterally  ABDOMEN: Nontender to palpation, normoactive bowel sounds, no rebound/guarding; No hepatosplenomegaly  MUSCULOSKELETAL: no clubbing or cyanosis of digits; +LLE splint   NEUROLOGY: Awake and alert to person, place; no focal neurological deficits   SKIN: No rashes; no palpable lesions    LABS:                        10.5   7.84  )-----------( 309      ( 29 Jun 2025 10:00 )             30.9     06-29    x   |  x   |  28[H]  ----------------------------<  283[H]  x    |  20[L]  |  0.81    Ca    8.4      29 Jun 2025 10:15  Phos  2.3     06-29  Mg     2.40     06-29            Urinalysis Basic - ( 29 Jun 2025 10:15 )    Color: x / Appearance: x / SG: x / pH: x  Gluc: 283 mg/dL / Ketone: x  / Bili: x / Urobili: x   Blood: x / Protein: x / Nitrite: x   Leuk Esterase: x / RBC: x / WBC x   Sq Epi: x / Non Sq Epi: x / Bacteria: x            Imaging and consultant notes reviewed.

## 2025-06-29 NOTE — PROGRESS NOTE ADULT - ASSESSMENT
84yFemale s/p L ankle ORIF in [facility] 6/27/25    - NWB LLE in SLC w strict elevation  - Pain control  - Mechanical/DVT ppx, ASA 81 + plavix  - OOB/PT/OT  - dispo planning pending PT eval   - ortho stable for dc     For all questions related to patient care, please reach out via the on-call pager.*     Judith Pfeiffer PGY2  Orthopedic Surgery  [facility]: [phone]  [facility]: [phone]  [facility]: [phone]

## 2025-06-29 NOTE — PROGRESS NOTE ADULT - SUBJECTIVE AND OBJECTIVE BOX
Orthopedic Surgery Progress Note     S: Patient seen and examined today. No acute events overnight. Pain is well controlled. Denies f/c, chest pain, shortness of breath, dizziness.    Vital Signs Last 24 Hrs  T(C): 36.6 (29 Jun 2025 04:24), Max: 37.2 (28 Jun 2025 22:45)  T(F): 97.9 (29 Jun 2025 04:24), Max: 98.9 (28 Jun 2025 22:45)  HR: 84 (29 Jun 2025 04:24) (73 - 86)  BP: 145/56 (29 Jun 2025 04:24) (134/61 - 145/56)  BP(mean): --  RR: 18 (29 Jun 2025 04:24) (17 - 18)  SpO2: 98% (29 Jun 2025 04:24) (98% - 100%)    Parameters below as of 29 Jun 2025 04:24  Patient On (Oxygen Delivery Method): room air        Physical Exam:  Gen: NAD  LLE:  in short leg cast  wiggling toes  toes wwp  SILT toes      LABS:  cret                        10.5   7.84  )-----------( 309      ( 29 Jun 2025 10:00 )             30.9     06-29    134[L]  |  101  |  28[H]  ----------------------------<  283[H]  4.2   |  20[L]  |  0.81    Ca    8.4      29 Jun 2025 10:15  Phos  2.3     06-29  Mg     2.40     06-29

## 2025-06-29 NOTE — PHYSICAL THERAPY INITIAL EVALUATION ADULT - PASSIVE RANGE OF MOTION EXAMINATION, REHAB EVAL
except left ankle not tested/bilateral upper extremity Passive ROM was WFL (within functional limits)/bilateral lower extremity Passive ROM was WFL (within functional limits)

## 2025-06-29 NOTE — PHYSICAL THERAPY INITIAL EVALUATION ADULT - MANUAL MUSCLE TESTING RESULTS, REHAB EVAL
Bilateral upper and lower extremity strength 3+/5; left ankle not tested
Bilateral upper extremities 4-/5, left lower extremity 3/5 except left ankle not tested; RLE 3+/5

## 2025-06-29 NOTE — PHYSICAL THERAPY INITIAL EVALUATION ADULT - IMPAIRMENTS FOUND, PT EVAL
gait, locomotion, and balance/gross motor/muscle strength/posture/ROM
gait, locomotion, and balance/muscle strength

## 2025-06-29 NOTE — PHYSICAL THERAPY INITIAL EVALUATION ADULT - DIAGNOSIS, PT EVAL
Pt presents with decreased strength and decreased balance.
decreased functional mobility, generalized weakness, difficulty ambulating

## 2025-06-29 NOTE — PHYSICAL THERAPY INITIAL EVALUATION ADULT - ACTIVE RANGE OF MOTION EXAMINATION, REHAB EVAL
except left ankle not tested/bilateral upper extremity Active ROM was WFL (within functional limits)/bilateral  lower extremity Active ROM was WFL (within functional limits)
left ankle not tested/romana. upper extremity Active ROM was WNL (within normal limits)/bilateral lower extremity Active ROM was WNL (within normal limits)

## 2025-06-29 NOTE — PHYSICAL THERAPY INITIAL EVALUATION ADULT - CRITERIA FOR SKILLED THERAPEUTIC INTERVENTIONS
impairments found/functional limitations in following categories/risk reduction/prevention/rehab potential
impairments found/functional limitations in following categories/risk reduction/prevention/rehab potential/therapy frequency

## 2025-06-30 LAB
ANION GAP SERPL CALC-SCNC: 15 MMOL/L — HIGH (ref 7–14)
BUN SERPL-MCNC: 28 MG/DL — HIGH (ref 7–23)
CALCIUM SERPL-MCNC: 8.7 MG/DL — SIGNIFICANT CHANGE UP (ref 8.4–10.5)
CHLORIDE SERPL-SCNC: 105 MMOL/L — SIGNIFICANT CHANGE UP (ref 98–107)
CO2 SERPL-SCNC: 16 MMOL/L — LOW (ref 22–31)
CREAT SERPL-MCNC: 0.68 MG/DL — SIGNIFICANT CHANGE UP (ref 0.5–1.3)
EGFR: 87 ML/MIN/1.73M2 — SIGNIFICANT CHANGE UP
EGFR: 87 ML/MIN/1.73M2 — SIGNIFICANT CHANGE UP
GLUCOSE BLDC GLUCOMTR-MCNC: 188 MG/DL — HIGH (ref 70–99)
GLUCOSE BLDC GLUCOMTR-MCNC: 209 MG/DL — HIGH (ref 70–99)
GLUCOSE BLDC GLUCOMTR-MCNC: 216 MG/DL — HIGH (ref 70–99)
GLUCOSE BLDC GLUCOMTR-MCNC: 306 MG/DL — HIGH (ref 70–99)
GLUCOSE SERPL-MCNC: 188 MG/DL — HIGH (ref 70–99)
MAGNESIUM SERPL-MCNC: 2.2 MG/DL — SIGNIFICANT CHANGE UP (ref 1.6–2.6)
PHOSPHATE SERPL-MCNC: 2.5 MG/DL — SIGNIFICANT CHANGE UP (ref 2.5–4.5)
POTASSIUM SERPL-MCNC: 4.9 MMOL/L — SIGNIFICANT CHANGE UP (ref 3.5–5.3)
POTASSIUM SERPL-SCNC: 4.9 MMOL/L — SIGNIFICANT CHANGE UP (ref 3.5–5.3)
SODIUM SERPL-SCNC: 136 MMOL/L — SIGNIFICANT CHANGE UP (ref 135–145)

## 2025-06-30 PROCEDURE — 99232 SBSQ HOSP IP/OBS MODERATE 35: CPT

## 2025-06-30 RX ORDER — INSULIN GLARGINE-YFGN 100 [IU]/ML
8 INJECTION, SOLUTION SUBCUTANEOUS AT BEDTIME
Refills: 0 | Status: DISCONTINUED | OUTPATIENT
Start: 2025-06-30 | End: 2025-07-01

## 2025-06-30 RX ORDER — NIFEDIPINE 30 MG
90 TABLET, EXTENDED RELEASE 24 HR ORAL DAILY
Refills: 0 | Status: DISCONTINUED | OUTPATIENT
Start: 2025-07-01 | End: 2025-07-01

## 2025-06-30 RX ADMIN — Medication 81 MILLIGRAM(S): at 12:58

## 2025-06-30 RX ADMIN — OXYCODONE HYDROCHLORIDE 5 MILLIGRAM(S): 30 TABLET ORAL at 22:30

## 2025-06-30 RX ADMIN — Medication 60 MILLIGRAM(S): at 05:49

## 2025-06-30 RX ADMIN — Medication 2 TABLET(S): at 21:33

## 2025-06-30 RX ADMIN — INSULIN LISPRO 1: 100 INJECTION, SOLUTION INTRAVENOUS; SUBCUTANEOUS at 17:40

## 2025-06-30 RX ADMIN — INSULIN LISPRO 2: 100 INJECTION, SOLUTION INTRAVENOUS; SUBCUTANEOUS at 09:11

## 2025-06-30 RX ADMIN — POLYETHYLENE GLYCOL 3350 17 GRAM(S): 17 POWDER, FOR SOLUTION ORAL at 12:58

## 2025-06-30 RX ADMIN — INSULIN GLARGINE-YFGN 8 UNIT(S): 100 INJECTION, SOLUTION SUBCUTANEOUS at 22:19

## 2025-06-30 RX ADMIN — ISOSORBIDE MONONITRATE 30 MILLIGRAM(S): 60 TABLET, EXTENDED RELEASE ORAL at 12:59

## 2025-06-30 RX ADMIN — Medication 1 APPLICATION(S): at 12:58

## 2025-06-30 RX ADMIN — OXYCODONE HYDROCHLORIDE 5 MILLIGRAM(S): 30 TABLET ORAL at 23:00

## 2025-06-30 RX ADMIN — Medication 1 TABLET(S): at 12:59

## 2025-06-30 RX ADMIN — INSULIN LISPRO 4: 100 INJECTION, SOLUTION INTRAVENOUS; SUBCUTANEOUS at 12:57

## 2025-06-30 RX ADMIN — ATORVASTATIN CALCIUM 20 MILLIGRAM(S): 80 TABLET, FILM COATED ORAL at 21:32

## 2025-06-30 RX ADMIN — CLOPIDOGREL BISULFATE 75 MILLIGRAM(S): 75 TABLET, FILM COATED ORAL at 12:59

## 2025-06-30 RX ADMIN — METOPROLOL SUCCINATE 25 MILLIGRAM(S): 50 TABLET, EXTENDED RELEASE ORAL at 05:49

## 2025-06-30 RX ADMIN — CALCITRIOL 0.25 MICROGRAM(S): 0.5 CAPSULE, GELATIN COATED ORAL at 12:58

## 2025-06-30 NOTE — OCCUPATIONAL THERAPY INITIAL EVALUATION ADULT - LIVES WITH, PROFILE
Pt. reports she lives with her daughter in a house with 5 steps to enter. Once inside, pt. reports bedroom and bathroom are located on the main level. Per pt., she has a shower stall in her bathroom, +shower chair.
Pt. reports she lives with her daughter in a house with 5 steps to enter. Once inside, pt. reports bedroom and bathroom are located on the main level. Per pt., she has a shower stall in her bathroom, +shower chair.

## 2025-06-30 NOTE — OCCUPATIONAL THERAPY INITIAL EVALUATION ADULT - PHYSICAL ASSIST/NONPHYSICAL ASSIST:DRESS UPPER BODY, OT EVAL
set-up required/verbal cues/nonverbal cues (demo/gestures)/1 person assist
set-up required/verbal cues/nonverbal cues (demo/gestures)/1 person assist

## 2025-06-30 NOTE — PROGRESS NOTE ADULT - PROBLEM SELECTOR PROBLEM 8
Preventive measure
Constipation
Preventive measure
Constipation
Preventive measure
Constipation
Constipation

## 2025-06-30 NOTE — CHART NOTE - NSCHARTNOTEFT_GEN_A_CORE
Patient blood sugar noted to be elevated on current regimen. Discussed with medical attending, ok to start Lantus 8u QHS based on correctional requirements (ordered).        MIKIE Linares-BC  Department of Medicine  [facility]  [id number]

## 2025-06-30 NOTE — PROGRESS NOTE ADULT - PROBLEM SELECTOR PLAN 8
c/w bowel regimen  monitor BMs
c/w bowel regimen  monitor BMs
DVT prophylaxis: Lovenox  Diet: Consistent carb   Ethics: Full code  Disposition: PT recommended rehab. Pt accepted at [facility], pending auth.
DVT prophylaxis: Aspirin and Plavix (Discussed with Ortho - recommended to NOT add lovenox due to increased risk for bleeding)  Diet: Consistent carb   Ethics: Full code  Disposition: PT recommended rehab. Pt accepted at [facility], pending auth. f/u SW/CM
c/w bowel regimen  monitor BMs
DVT prophylaxis: Lovenox  GI prophylaxis: NI  Diet: Consistent carb   Ethics: Full code  Disposition: Pending clinical course      Hypophosphatemia- replete Phos  PT rec rehab, ok for rehab per ortho    Plan discussed with ACP
c/w bowel regimen  monitor BMs

## 2025-06-30 NOTE — PROGRESS NOTE ADULT - PROBLEM SELECTOR PLAN 5
Home medications: Atorvastatin 20 mg daily and Vascepa 1 gm BID    Cont Atorvastatin 20 mg daily
Hx of CVA approximately 15 years ago w/ residual left sided deficits  Home medications: Plavix    Hold Plavix for now as pt has ankle surgery scheduled for Friday 06/27  Maintain fall precautions
Hx of CVA approximately 15 years ago w/ residual left sided deficits  Home medications: Plavix    - Restarted plavix yesterday 06/28 s/p L ankle ORIF in [facility] 6/27/25  - Maintain fall precautions
Home medications: Atorvastatin 20 mg daily and Vascepa 1 gm BID    Atorvastatin 20 mg daily
Hx of CVA approximately 15 years ago w/ residual left sided deficits  Home medications: Plavix    - Restart plavix today 06/28 s/p L ankle [place] [facility] 6/27/25  - Maintain fall precautions
Home medications: Atorvastatin 20 mg daily and Vascepa 1 gm BID    Cont Atorvastatin 20 mg daily
Home medications: Atorvastatin 20 mg daily and Vascepa 1 gm BID    Atorvastatin 20 mg daily
Hx of CVA approximately 15 years ago w/ residual left sided deficits  Home medications: Plavix    Continue Plavix   Maintain fall precautions
Hx of CVA approximately 15 years ago w/ residual left sided deficits  Home medications: Plavix    Hold Plavix for now as pt has ankle surgery scheduled for Friday 06/27  Maintain fall precautions
Hx of CVA approximately 15 years ago w/ residual left sided deficits  Home medications: Plavix    Hold Plavix for now as pt has ankle surgery scheduled for Friday 06/27  Maintain fall precautions
Home medications: Atorvastatin 20 mg daily and Vascepa 1 gm BID (nonformulary)    Cont Atorvastatin 20 mg daily

## 2025-06-30 NOTE — PROGRESS NOTE ADULT - PROBLEM SELECTOR PLAN 1
Presented to the ED s/p mechanical fall while trying to get to bedside commode  Was unable to bear weight on affected LLE due to the pain   XR left foot demonstrated acute displaced fractures of the left lateral and medial malleoli with associated lateral tibiotalar joint dislocation  Prelim CT left ankle demonstrated acute comminuted mildly displaced trimalleolar fractures  Orthopedic surgery consulted and recommended plan for ankle surgery on Friday 06/27   Underwent closed reduction and immobilization in the ED   XR Left ankle after orthopedic intervention demonstrated s/p casting and reduction of acute displaced fractures of the lateral and medial malleoli and distal fibular metadiaphysis with improved alignment  Tylenol PRN for mild pain   Oxycodone 2.5 mg q 6 PRN for moderate pain   Oxycodone 5 mg q 6 PRN for severe pain   PT with NWB LLE in short leg trilam splint  PT rec STEVE  Orthopedic recommendations appreciated
Presented to the ED s/p mechanical fall while trying to get to bedside commode  Was unable to bear weight on affected LLE due to the pain   XR left foot demonstrated acute displaced fractures of the left lateral and medial malleoli with associated lateral tibiotalar joint dislocation  Prelim CT left ankle demonstrated acute comminuted mildly displaced trimalleolar fractures  Orthopedic surgery consulted and recommended plan for ankle surgery today 06/27 (Plavix and Lovenox on hold)  Underwent closed reduction and immobilization in the ED   XR Left ankle after orthopedic intervention demonstrated s/p casting and reduction of acute displaced fractures of the lateral and medial malleoli and distal fibular metadiaphysis with improved alignment  Tylenol PRN for mild pain   Oxycodone 2.5 mg q 6 PRN for moderate pain   Oxycodone 5 mg q 6 PRN for severe pain   PT with NWB LLE in short leg trilam splint  PT rec STEVE  Orthopedic recommendations appreciated
Presented to the ED s/p mechanical fall while trying to get to bedside commode  Was unable to bear weight on affected LLE due to the pain   XR left foot demonstrated acute displaced fractures of the left lateral and medial malleoli with associated lateral tibiotalar joint dislocation  Prelim CT left ankle demonstrated acute comminuted mildly displaced trimalleolar fractures  Orthopedic surgery consulted and recommended to follow up as outpatient and surgical intervention at a later date  Underwent closed reduction and immobilization in the ED   XR Left ankle after orthopedic intervention demonstrated s/p casting and reduction of acute displaced fractures of the lateral and medial malleoli and distal fibular metadiaphysis with improved alignment    Tylenol PRN for mild pain   Oxycodone 2.5 mg q 6 PRN for moderate pain   Oxycodone 5 mg q 6 PRN for severe pain   PT with NWB LLE in short leg trilam splint  PT/OT as tolerated   Orthopedic recommendations appreciated
Presented to the ED s/p mechanical fall while trying to get to bedside commode  Was unable to bear weight on affected LLE due to the pain   XR left foot demonstrated acute displaced fractures of the left lateral and medial malleoli with associated lateral tibiotalar joint dislocation  Prelim CT left ankle demonstrated acute comminuted mildly displaced trimalleolar fractures  Orthopedic surgery consulted and recommended plan for ankle surgery on Friday 06/27 (Plavix held, hold Lovenox today)  Underwent closed reduction and immobilization in the ED   XR Left ankle after orthopedic intervention demonstrated s/p casting and reduction of acute displaced fractures of the lateral and medial malleoli and distal fibular metadiaphysis with improved alignment  Tylenol PRN for mild pain   Oxycodone 2.5 mg q 6 PRN for moderate pain   Oxycodone 5 mg q 6 PRN for severe pain   PT with NWB LLE in short leg trilam splint  PT rec STEVE  Orthopedic recommendations appreciated
Presented to the ED s/p mechanical fall while trying to get to bedside commode  Was unable to bear weight on affected LLE due to the pain   XR left foot demonstrated acute displaced fractures of the left lateral and medial malleoli with associated lateral tibiotalar joint dislocation  Prelim CT left ankle demonstrated acute comminuted mildly displaced trimalleolar fractures  Orthopedic surgery consulted and recommended to follow up as outpatient and surgical intervention at a later date  Underwent closed reduction and immobilization in the ED   XR Left ankle after orthopedic intervention demonstrated s/p casting and reduction of acute displaced fractures of the lateral and medial malleoli and distal fibular metadiaphysis with improved alignment  Tylenol PRN for mild pain   Oxycodone 2.5 mg q 6 PRN for moderate pain   Oxycodone 5 mg q 6 PRN for severe pain   PT with NWB LLE in short leg trilam splint  PT rec STEVE  Orthopedic recommendations appreciated
Presented to the ED s/p mechanical fall while trying to get to bedside commode  Was unable to bear weight on affected LLE due to the pain   XR left foot demonstrated acute displaced fractures of the left lateral and medial malleoli with associated lateral tibiotalar joint dislocation  Prelim CT left ankle demonstrated acute comminuted mildly displaced trimalleolar fractures  Orthopedic surgery consulted and recommended to follow up as outpatient and surgical intervention at a later date  Underwent closed reduction and immobilization in the ED   XR Left ankle after orthopedic intervention demonstrated s/p casting and reduction of acute displaced fractures of the lateral and medial malleoli and distal fibular metadiaphysis with improved alignment  Tylenol PRN for mild pain   Oxycodone 2.5 mg q 6 PRN for moderate pain   Oxycodone 5 mg q 6 PRN for severe pain   PT with NWB LLE in short leg trilam splint  PT rec STEVE  Orthopedic recommendations appreciated
Presented to the ED s/p mechanical fall while trying to get to bedside commode  Was unable to bear weight on affected LLE due to the pain   XR left foot demonstrated acute displaced fractures of the left lateral and medial malleoli with associated lateral tibiotalar joint dislocation  Prelim CT left ankle demonstrated acute comminuted mildly displaced trimalleolar fractures  Orthopedic surgery consulted - s/p L ankle [facility]IF in [facility] [place] 6[place]27/25  Underwent closed reduction and immobilization in the ED   XR Left ankle after orthopedic intervention demonstrated s/p casting and reduction of acute displaced fractures of the lateral and medial malleoli and distal fibular metadiaphysis with improved alignment  Tylenol PRN for mild pain   Oxycodone 2.5 mg q 6 PRN for moderate pain   Oxycodone 5 mg q 6 PRN for severe pain   PT with NWB LLE in short leg trilam splint  PT rec STEVE  Orthopedic recommendations appreciated
Presented to the ED s/p mechanical fall while trying to get to bedside commode  Was unable to bear weight on affected LLE due to the pain   XR left foot demonstrated acute displaced fractures of the left lateral and medial malleoli with associated lateral tibiotalar joint dislocation  Prelim CT left ankle demonstrated acute comminuted mildly displaced trimalleolar fractures  Orthopedic surgery consulted and recommended to follow up as outpatient and surgical intervention at a later date  Underwent closed reduction and immobilization in the ED   XR Left ankle after orthopedic intervention demonstrated s/p casting and reduction of acute displaced fractures of the lateral and medial malleoli and distal fibular metadiaphysis with improved alignment    Tylenol PRN for mild pain   Oxycodone 2.5 mg q 6 PRN for moderate pain   Oxycodone 5 mg q 6 PRN for severe pain   PT with NWB LLE in short leg trilam splint  PT/OT as tolerated   Orthopedic recommendations appreciated
Presented to the ED s/p mechanical fall while trying to get to bedside commode  Was unable to bear weight on affected LLE due to the pain   XR left foot demonstrated acute displaced fractures of the left lateral and medial malleoli with associated lateral tibiotalar joint dislocation  Prelim CT left ankle demonstrated acute comminuted mildly displaced trimalleolar fractures  Orthopedic surgery consulted - [place] [facility] [place]  Underwent closed reduction and immobilization in the ED   XR Left ankle after orthopedic intervention demonstrated s/p casting and reduction of acute displaced fractures of the lateral and medial malleoli and distal fibular metadiaphysis with improved alignment  Tylenol PRN for mild pain   Oxycodone 2.5 mg q 6 PRN for moderate pain   Oxycodone 5 mg q 6 PRN for severe pain   PT with NWB LLE in short leg trilam splint  PT rec STEVE  Orthopedic recommendations appreciated
Presented to the ED s/p mechanical fall while trying to get to bedside commode  Was unable to bear weight on affected LLE due to the pain   XR left foot demonstrated acute displaced fractures of the left lateral and medial malleoli with associated lateral tibiotalar joint dislocation  Prelim CT left ankle demonstrated acute comminuted mildly displaced trimalleolar fractures  Orthopedic surgery consulted [place] [facility] [place]  Underwent closed reduction and immobilization in the ED   XR Left ankle after orthopedic intervention demonstrated s/p casting and reduction of acute displaced fractures of the lateral and medial malleoli and distal fibular metadiaphysis with improved alignment  Tylenol PRN for mild pain   Oxycodone 2.5 mg q 6 PRN for moderate pain   Oxycodone 5 mg q 6 PRN for severe pain   PT with NWB LLE in short leg trilam splint  PT rec STEVE  Orthopedic recommendations appreciated
Presented to the ED s/p mechanical fall while trying to get to bedside commode  Was unable to bear weight on affected LLE due to the pain   XR left foot demonstrated acute displaced fractures of the left lateral and medial malleoli with associated lateral tibiotalar joint dislocation  Prelim CT left ankle demonstrated acute comminuted mildly displaced trimalleolar fractures  Orthopedic surgery consulted and recommended plan for ankle surgery on Friday 06/27 (Plavix held, hold Lovenox on Thursday 06/26)  Underwent closed reduction and immobilization in the ED   XR Left ankle after orthopedic intervention demonstrated s/p casting and reduction of acute displaced fractures of the lateral and medial malleoli and distal fibular metadiaphysis with improved alignment  Tylenol PRN for mild pain   Oxycodone 2.5 mg q 6 PRN for moderate pain   Oxycodone 5 mg q 6 PRN for severe pain   PT with NWB LLE in short leg trilam splint  PT rec STEVE  Orthopedic recommendations appreciated

## 2025-06-30 NOTE — OCCUPATIONAL THERAPY INITIAL EVALUATION ADULT - ADDITIONAL COMMENTS
Prior to procedure on 6/11/25, pt reports she was independent with most ADL's, using a cane for functional mobility. However, after procedure on 6/11/25, pt explains she has been mostly bedbound and benefiting from one person assistance from daughter for ADL's (including sponge bathing, bed<>commode transfers), +back brace. Per pt., she believes that her daughter has CDPAP hours about 4 hours/day, but is not sure how many days/week.
Prior to procedure on 6/11/25, pt reports she was independent with most ADL's, using a cane for functional mobility. However, after procedure on 6/11/25, pt explains she has been mostly bedbound and benefiting from one person assistance from daughter for ADL's (including sponge bathing, bed<>commode transfers), +back brace. Per pt., she believes that her daughter has CDPAP hours about 4 hours/day, but is not sure how many days/week.

## 2025-06-30 NOTE — PROGRESS NOTE ADULT - PROBLEM SELECTOR PLAN 4
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
Home medications: Glipizide 10 mg daily and Tradjenta 5 mg daily   HbA1c 6.3% in April, repeat HbA1c on 06/23 is 6.8%  ISS  Monitor BG levels and adjust accordingly
Home medications: Glipizide 10 mg daily and Tradjenta 5 mg daily   HbA1c 6.3% in April, repeat HbA1c on 06/23 is 6.8%  ISS  Monitor BG levels and adjust accordingly
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
Home medications: Glipizide 10 mg daily and Tradjenta 5 mg daily   HbA1c 6.3% in April, repeat HbA1c on 06/23 is 6.8%  ISS  Monitor BG levels and adjust accordingly
Home medications: Glipizide 10 mg daily and Tradjenta 5 mg daily   HbA1c 6.3% in April, repeat HbA1c on 06/23 is 6.8%  ISS  Monitor BG levels and adjust accordingly
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
Hx of CVA approximately 15 years ago w/ residual left sided deficits  Home medications: Plavix    Continue Plavix   Maintain fall precautions
Hx of CVA approximately 15 years ago w/ residual left sided deficits  Home medications: Plavix    - Continue plavix, aspirin, lipitor  - Maintain fall precautions
Home medications: Glipizide 10 mg daily and Tradjenta 5 mg daily   HbA1c 6.3% in April, repeat HbA1c on 06/23 is 6.8%  ISS  Monitor BG levels and adjust accordingly
Home medications: Glipizide 10 mg daily and Tradjenta 5 mg daily   HbA1c 6.3% in April, repeat HbA1c on 06/23 is 6.8%  ISS  Monitor BG levels and adjust accordingly

## 2025-06-30 NOTE — OCCUPATIONAL THERAPY INITIAL EVALUATION ADULT - RANGE OF MOTION EXAMINATION, LOWER EXTREMITY
Left LE: Not assessed secondary to surgery/Right LE Active Assistive ROM was WFL  (within functional limits)
Left LE: Not assessed secondary to surgery/Right LE Active Assistive ROM was WFL  (within functional limits)

## 2025-06-30 NOTE — PROGRESS NOTE ADULT - SUBJECTIVE AND OBJECTIVE BOX
[facility] Division of Hospital Medicine  Darren Boyle DO  Available via MS Teams  Pager:[phone]    SUBJECTIVE / OVERNIGHT EVENTS: No acute events overnight. Patient denies any symptoms. Used mandarin  [id number].    ADDITIONAL REVIEW OF SYSTEMS:  Review of Systems:   CONSTITUTIONAL: No fever, weight loss  EYES: No eye pain, visual disturbances, or discharge  ENMT:  No difficulty hearing, tinnitus, vertigo; No sinus or throat pain  RESPIRATORY: No SOB. No cough, wheezing, chills or hemoptysis  CARDIOVASCULAR: No chest pain, palpitations, dizziness  GASTROINTESTINAL: No abdominal or epigastric pain. No nausea, vomiting, or hematemesis; No diarrhea or constipation. No melena or hematochezia.  GENITOURINARY: No dysuria, frequency, hematuria, or incontinence  NEUROLOGICAL: No headaches, memory loss, loss of strength, numbness, or tremors  SKIN: No itching, burning, rashes, or lesions   LYMPH NODES: No enlarged glands  ENDOCRINE: No heat or cold intolerance; No hair loss  MUSCULOSKELETAL: No joint pain or swelling; No muscle, back pain  HEME/LYMPH: No easy bruising, or bleeding gums      MEDICATIONS  (STANDING):  aspirin enteric coated 81 milliGRAM(s) Oral daily  atorvastatin 20 milliGRAM(s) Oral at bedtime  calcitriol   Capsule 0.25 MICROGram(s) Oral daily  chlorhexidine 2% Cloths 1 Application(s) Topical daily  clopidogrel Tablet 75 milliGRAM(s) Oral daily  dextrose 5%. 1000 milliLiter(s) (100 mL/Hr) IV Continuous <Continuous>  dextrose 5%. 1000 milliLiter(s) (50 mL/Hr) IV Continuous <Continuous>  dextrose 50% Injectable 25 Gram(s) IV Push once  dextrose 50% Injectable 12.5 Gram(s) IV Push once  dextrose 50% Injectable 25 Gram(s) IV Push once  glucagon  Injectable 1 milliGRAM(s) IntraMuscular once  insulin lispro (ADMELOG) corrective regimen sliding scale   SubCutaneous three times a day before meals  insulin lispro (ADMELOG) corrective regimen sliding scale   SubCutaneous at bedtime  isosorbide   mononitrate ER Tablet (IMDUR) 30 milliGRAM(s) Oral daily  metoprolol succinate ER 25 milliGRAM(s) Oral daily  Nephro-cassidy 1 Tablet(s) Oral daily  NIFEdipine XL 60 milliGRAM(s) Oral daily  polyethylene glycol 3350 17 Gram(s) Oral daily  senna 2 Tablet(s) Oral at bedtime    MEDICATIONS  (PRN):  acetaminophen     Tablet .. 650 milliGRAM(s) Oral every 6 hours PRN Temp greater or equal to 38C (100.4F), Mild Pain (1 - 3)  aluminum hydroxide/magnesium hydroxide/simethicone Suspension 30 milliLiter(s) Oral every 4 hours PRN Dyspepsia  artificial tears (preservative free) Ophthalmic Solution 1 Drop(s) Both EYES two times a day PRN Dry Eyes  dextrose Oral Gel 15 Gram(s) Oral once PRN Blood Glucose LESS THAN 70 milliGRAM(s)/deciliter  melatonin 3 milliGRAM(s) Oral at bedtime PRN Insomnia  ondansetron Injectable 4 milliGRAM(s) IV Push every 8 hours PRN Nausea and/or Vomiting  oxyCODONE    IR 2.5 milliGRAM(s) Oral every 6 hours PRN Moderate Pain (4 - 6)  oxyCODONE    IR 5 milliGRAM(s) Oral every 6 hours PRN Severe Pain (7 - 10)      I&O's Summary    29 Jun 2025 07:01  -  30 Jun 2025 07:00  --------------------------------------------------------  IN: 0 mL / OUT: 650 mL / NET: -650 mL        PHYSICAL EXAM:  Vital Signs Last 24 Hrs  T(C): 36.6 (30 Jun 2025 05:24), Max: 37 (29 Jun 2025 15:07)  T(F): 97.8 (30 Jun 2025 05:24), Max: 98.6 (29 Jun 2025 15:07)  HR: 67 (30 Jun 2025 06:51) (67 - 83)  BP: 151/63 (30 Jun 2025 06:51) (140/68 - 164/82)  BP(mean): --  RR: 17 (30 Jun 2025 05:24) (17 - 18)  SpO2: 100% (30 Jun 2025 05:24) (97% - 100%)    Parameters below as of 30 Jun 2025 05:24  Patient On (Oxygen Delivery Method): room air      CONSTITUTIONAL: NAD, well-groomed  EYES: PERRLA; conjunctiva and sclera clear  ENMT: Moist oral mucosa, no pharyngeal injection or exudates; normal dentition  NECK: Supple, no palpable masses; no thyromegaly  RESPIRATORY: Normal respiratory effort; lungs are clear to auscultation bilaterally  CARDIOVASCULAR: Regular rate and rhythm, normal S1 and S2, no murmur/rub/gallop; No lower extremity edema; Peripheral pulses are 2+ bilaterally  ABDOMEN: Nontender to palpation, normoactive bowel sounds, no rebound/guarding; No hepatosplenomegaly  MUSCULOSKELETAL:  No clubbing or cyanosis of digits; no joint swelling or tenderness to palpation, LLE splint intact  PSYCH: A+O to person, place, and time; affect appropriate  NEUROLOGY: CN 2-12 are intact and symmetric; no gross sensory deficits     LABS:                        10.5   7.84  )-----------( 309      ( 29 Jun 2025 10:00 )             30.9     06-30    136  |  105  |  28[H]  ----------------------------<  188[H]  4.9   |  16[L]  |  0.68    Ca    8.7      30 Jun 2025 06:15  Phos  2.5     06-30  Mg     2.20     06-30            Urinalysis Basic - ( 30 Jun 2025 06:15 )    Color: x / Appearance: x / SG: x / pH: x  Gluc: 188 mg/dL / Ketone: x  / Bili: x / Urobili: x   Blood: x / Protein: x / Nitrite: x   Leuk Esterase: x / RBC: x / WBC x   Sq Epi: x / Non Sq Epi: x / Bacteria: x            RADIOLOGY & ADDITIONAL TESTS:  New Imaging Personally Reviewed Today: Yes  New Electrocardiogram Personally Reviewed Today: Yes  Other Results Reviewed Today: Yes  Prior or Outpatient Records Reviewed Today with Summary: Yes    COORDINATION OF CARE:  Consultant Communication and Details of Discussion (where applicable): Yes     minimum assist (75% patients effort)

## 2025-06-30 NOTE — PROGRESS NOTE ADULT - PROBLEM SELECTOR PLAN 6
Home medications: Atorvastatin 20 mg daily and Vascepa 1 gm BID    Cont Atorvastatin 20 mg daily
Home medications: Kerendia 10 mg daily   Cr: 0.97    Monitor BMP  Maintain K>4, Mg>2 and Phos>3   Avoid nephrotoxic medications  Renally dose medications if necessary
Home medications: Atorvastatin 20 mg daily and Vascepa 1 gm BID    Cont Atorvastatin 20 mg daily
Home medications: Kerendia 10 mg daily   Cr: 0.87    Monitor BMP  Maintain K>4, Mg>2 and Phos>3   Avoid nephrotoxic medications  Renally dose medications if necessary
Home medications: Atorvastatin 20 mg daily and Vascepa 1 gm BID    Cont Atorvastatin 20 mg daily
Home medications: Atorvastatin 20 mg daily and Vascepa 1 gm BID    Cont Atorvastatin 20 mg daily
Home medications: Kerendia 10 mg daily   Cr: 0.87    Monitor BMP  Maintain K>4, Mg>2 and Phos>3   Avoid nephrotoxic medications  Renally dose medications if necessary
Home medications: Kerendia (Finerenone) 10 mg daily     Monitor BMP  Maintain K>4, Mg>2 and Phos>3   Avoid nephrotoxic medications  Renally dose medications if necessary
Home medications: Kerendia (Finerenone) 10 mg daily   Cr: 0.97    Monitor BMP  Maintain K>4, Mg>2 and Phos>3   Avoid nephrotoxic medications  Renally dose medications if necessary
Home medications: Atorvastatin 20 mg daily and Vascepa 1 gm BID    Cont Atorvastatin 20 mg daily
Home medications: Atorvastatin 20 mg daily and Vascepa 1 gm BID    Cont Atorvastatin 20 mg daily

## 2025-06-30 NOTE — PROGRESS NOTE ADULT - PROBLEM SELECTOR PROBLEM 1
Ankle fracture

## 2025-06-30 NOTE — OCCUPATIONAL THERAPY INITIAL EVALUATION ADULT - NSOTDMEREC_GEN_A_CORE
To be determined at [facility] (Sub-Acute Rehab)
To be determined at [facility] ([facility] Re[facility])

## 2025-06-30 NOTE — PROGRESS NOTE ADULT - ASSESSMENT
84yFemale s/p L ankle [place] [facility] [place]    - NWB LLE in SLC w strict elevation  - Pain control  - Mechanical/DVT ppx, ASA 81 + plavix  - OOB/PT/OT  - dispo: pending STEVE      Orthopedically stable for discharge. Please have patient FU with Dr. Thompson 1 week after discharge, call his office to make appointment      For all questions related to patient care, please reach out to the on-call team via the pager.     Morelia Rendon, PGY 4  Orthopaedic Surgery  [facility] [phone]  [facility] [place] [phone]  [facility] [phone]

## 2025-06-30 NOTE — OCCUPATIONAL THERAPY INITIAL EVALUATION ADULT - PHYSICAL ASSIST/NONPHYSICAL ASSIST: GROOMING, OT EVAL
set-up required/verbal cues/nonverbal cues (demo/gestures)/1 person assist
set-up required/supervision
Telemetry

## 2025-06-30 NOTE — PROVIDER CONTACT NOTE (OTHER) - ACTION/TREATMENT ORDERED:
ACP made aware
ACP made aware
ACP made aware, 2.5 metoprolol IV push ordered.
ACP made aware, manual blood pressure
ACP made aware
ACP aware
No interventions presently.
Recheck BP in 1hr

## 2025-06-30 NOTE — PROGRESS NOTE ADULT - PROBLEM SELECTOR PLAN 2
Home medications: Nifedipine 60 mg daily, metoprolol succinate 25 mg daily and Imdur ER 30 mg daily     Nifedipine 60 mg daily   Metoprolol succinate 25 mg daily   Imdur ER 30 mg daily   Monitor BP and adjust accordingly
Pre-operative risk stratification prior to ankle surgery 06/27/2025:    - RCRI Class II risk (1 point for h/o CVA; not a high-risk surgery; no h/o IHD, CHF; not using insulin at home; pre-op creatinine < 2) = 6% risk of 30-day mortality from death, MI, or cardiac arrest   - Patient is at moderate risk for an intermediate risk surgery, but is medically optimized and may proceed with ankle surgery as scheduled.
Home medications: Nifedipine 60 mg daily, metoprolol succinate 25 mg daily and Imdur ER 30 mg daily     cont Nifedipine 60 mg daily, Metoprolol succinate 25 mg daily, Imdur ER 30 mg daily   Monitor BP and adjust accordingly
Pre-operative risk stratification prior to ankle surgery on Friday 06/27/2025:    - RCRI Class II risk (1 point for h/o CVA; not a high-risk surgery; no h/o IHD, CHF; not using insulin at home; pre-op creatinine < 2) = 6% risk of 30-day mortality from death, MI, or cardiac arrest   - Patient is at moderate risk for an intermediate risk surgery, but is medically optimized and may proceed with ankle surgery as scheduled.
Home medications: Nifedipine 60 mg daily, metoprolol succinate 25 mg daily and Imdur ER 30 mg daily     Continue Metoprolol succinate 25 mg daily, Imdur ER 30 mg daily, patient is persistently hypertensive uptitrated nifedipine to 90mg daily  Monitor BP
Pre-operative risk stratification prior to ankle surgery 06/27/2025:    - RCRI Class II risk (1 point for h/o CVA; not a high-risk surgery; no h/o IHD, CHF; not using insulin at home; pre-op creatinine < 2) = 6% risk of 30-day mortality from death, MI, or cardiac arrest   - Patient is at moderate risk for an intermediate risk surgery, but is medically optimized and may proceed with ankle surgery as scheduled.
Home medications: Nifedipine 60 mg daily, metoprolol succinate 25 mg daily and Imdur ER 30 mg daily     Nifedipine 60 mg daily   Metoprolol succinate 25 mg daily   Imdur ER 30 mg daily   Monitor BP and adjust accordingly
Pre-operative risk stratification prior to ankle surgery on Friday 06/27/2025:    - RCRI Class II risk (1 point for h/o CVA; not a high-risk surgery; no h/o IHD, CHF; not using insulin at home; pre-op creatinine < 2) = 6% risk of 30-day mortality from death, MI, or cardiac arrest   - Patient is at moderate risk for an intermediate risk surgery, but is medically optimized and may proceed with ankle surgery as scheduled.
I have personally seen and examined the patient. I have collaborated with and supervised the
Home medications: Nifedipine 60 mg daily, metoprolol succinate 25 mg daily and Imdur ER 30 mg daily     cont Nifedipine 60 mg daily, Metoprolol succinate 25 mg daily, Imdur ER 30 mg daily   Monitor BP and adjust accordingly as needed
Pre-operative risk stratification prior to ankle surgery on Friday 06/27/2025:    - RCRI Class II risk (1 point for h/o CVA; not a high-risk surgery; no h/o IHD, CHF; not using insulin at home; pre-op creatinine < 2) = 6% risk of 30-day mortality from death, MI, or cardiac arrest   - Patient is at moderate risk for an intermediate risk surgery, but is medically optimized and may proceed with ankle surgery as scheduled.
Pre-operative risk stratification prior to ankle surgery today 06/27/2025:    - RCRI Class II risk (1 point for h/o CVA; not a high-risk surgery; no h/o IHD, CHF; not using insulin at home; pre-op creatinine < 2) = 6% risk of 30-day mortality from death, MI, or cardiac arrest   - Patient is at moderate risk for an intermediate risk surgery, but is medically optimized and may proceed with ankle surgery as scheduled.

## 2025-06-30 NOTE — PROGRESS NOTE ADULT - PROBLEM SELECTOR PROBLEM 2
Encounter for preoperative assessment
HTN (hypertension)
Encounter for preoperative assessment
HTN (hypertension)
Encounter for preoperative assessment
Encounter for preoperative assessment

## 2025-06-30 NOTE — OCCUPATIONAL THERAPY INITIAL EVALUATION ADULT - THERAPY FREQUENCY, OT EVAL
Per last office visit on 2/27/2020, \" Return in about 3 months (around 5/27/2020) for Medicare Wellness visit with diabetes follow up, non-fasting labs prior, medicare cas.\"     Medication last refilled on 3/18/2020 with 90 caps and 0 refills. Will send RX for another 3 months.    Please call patient to schedule MWV ASAP.   
Provider:  Faith Pina NP  Medication Name:  omeprazole (PRILOSEC) 10 MG capsule    Pharmacy Name:  [facility]  Pharmacy City:  Mail order  How many doses remain:  30  Additional Comments:      PSR:  Inform patient to allow 24 business hours for refills.  
Will continue to follow pt. while inpatient./1-2x/week
Will continue to follow pt. while inpatient./1-2x/week

## 2025-06-30 NOTE — PROVIDER CONTACT NOTE (OTHER) - BACKGROUND
Pt admitted for L ankle fracture and L joint dislocation
Pt admitted for L ankle fracture and L joint dislocation
Pt was admitted with L foot fx
Pt admitted for fracture
Pt admitted for L ankle fracture and L joint dislocation
Pt admitted for L ankle fracture and L joint dislocation
Pt was admitted with L foot Fx
Pt admitted for L ankle fracture and L joint dislocation

## 2025-06-30 NOTE — OCCUPATIONAL THERAPY INITIAL EVALUATION ADULT - LEVEL OF INDEPENDENCE: DRESS LOWER BODY, OT EVAL
maximum assist (25% patients effort)/dependent (less than 25% patients effort)
maximum assist (25% patients effort)/dependent (less than 25% patients effort)

## 2025-06-30 NOTE — OCCUPATIONAL THERAPY INITIAL EVALUATION ADULT - PERTINENT HX OF CURRENT PROBLEM, REHAB EVAL
Pt is an 84 year old female with hx of HTN, DM2, CVA with residual Left-sided deficits, recurrent UTIs, HLD, CKD stage II, recent L4 vertebral augmentation (06/11/25), who presented to [facility] s/p mechanical fall with left ankle deformity. XR left foot demonstrated acute displaced fractures of the left lateral and medial malleoli with associated lateral tibiotalar joint dislocation. XR pelvis demonstrated no acute fracture or dislocation. Orthopedic surgery was consulted and performed closed reduction and immobilization with placement of splint. XR Left ankle after orthopedic intervention demonstrated s/p casting and reduction of acute displaced fractures of the lateral and medial malleoli and distal fibular metadiaphysis with improved alignment. Pt. is s/p Left ankle ORIF on 6/27/25.
Pt is an 84 year old female with hx of HTN, DM2, CVA with residual Left-sided deficits, recurrent UTIs, HLD, CKD stage II, recent L4 vertebral augmentation (06/11/25), who presented to [facility] s/p mechanical fall with left ankle deformity. XR left foot demonstrated acute displaced fractures of the left lateral and medial malleoli with associated lateral tibiotalar joint dislocation. XR pelvis demonstrated no acute fracture or dislocation. Orthopedic surgery was consulted and performed closed reduction and immobilization with placement of splint. XR Left ankle after orthopedic intervention demonstrated s/p casting and reduction of acute displaced fractures of the lateral and medial malleoli and distal fibular metadiaphysis with improved alignment.

## 2025-06-30 NOTE — OCCUPATIONAL THERAPY INITIAL EVALUATION ADULT - GENERAL OBSERVATIONS, REHAB EVAL
Pt. received semisupine in bed. No acute distress. Patient agreed to evaluation from Occupational Therapist. +Heplock, +Ace Wrap and Short Leg Cast to Left LE
Pt. received semisupine in bed. No acute distress. Patient agreed to evaluation from Occupational Therapist. +Heplock, +Ace Wrap and Splint to Left LE

## 2025-06-30 NOTE — PROVIDER CONTACT NOTE (OTHER) - ASSESSMENT
Pt alert and responsive. No s/s of distress noted
Pt alert and responsive. No s/s of distress noted, no reports of chest pain or dizziness, asymptomatic
Pt alert and responsive. No s/s of distress noted, no reports of chest pain or dizziness.
Pt BP is 145/56 mmhg. Asymptomatic. Notifying you per diastolic niotification parameter
Pt alert and responsive. No s/s of distress noted, no reports of chest pain or dizziness, asymptomatic
Pt alert and responsive. No s/s of distress noted, no reports of chest pain or dizziness, asymptomatic
Pt BP is 164/82 mmhg. Asymptomatic

## 2025-06-30 NOTE — PROVIDER CONTACT NOTE (OTHER) - NAME OF MD/NP/PA/DO NOTIFIED:
Iraida Richards, Dianelys castillo
Raul Small
Roxanne Carmona
Roxanne Carmona
CATHI Guzman
ACP Erin Dodge
Leesa Reno
Roxanne Carmona

## 2025-06-30 NOTE — PROGRESS NOTE ADULT - PROBLEM SELECTOR PROBLEM 3
DM2 (diabetes mellitus, type 2)
DM2 (diabetes mellitus, type 2)
HTN (hypertension)
DM2 (diabetes mellitus, type 2)
DM2 (diabetes mellitus, type 2)
HTN (hypertension)
DM2 (diabetes mellitus, type 2)
HTN (hypertension)

## 2025-06-30 NOTE — OCCUPATIONAL THERAPY INITIAL EVALUATION ADULT - LLE MMT, REHAB EVAL
Not assessed secondary to non-weight bearing precautions
Not assessed secondary to non-weight bearing precautions

## 2025-06-30 NOTE — OCCUPATIONAL THERAPY INITIAL EVALUATION ADULT - PLANNED THERAPY INTERVENTIONS, OT EVAL
ADL retraining/balance training/bed mobility training/neuromuscular re-education/ROM/strengthening/transfer training
ADL retraining/balance training/bed mobility training/neuromuscular re-education/ROM/strengthening/transfer training

## 2025-06-30 NOTE — PROGRESS NOTE ADULT - TIME BILLING
40 minutes of total time dedicated to this patient visit today including preparing to see the patient (eg. review of tests), obtaining and/or reviewing separately obtained history, obtaining/reviewing vitals, performing a medically appropriate examination and/or evaluation, counseling and educating the patient/family/caregiver, reviewing previous notes and test results, and procedures, communicating with other health professionals (when not separately reported), and documenting clinical information in the electronic health record. This time excludes teaching and separately reported service.
- Ordering, reviewing, and interpreting labs, testing, and imaging.  - Independently obtaining a review of systems and performing a physical exam  - Reviewing consultant documentation/recommendations in addition to discussing plan of care with consultants.  - Counselling and educating patient and family regarding interpretation of aforementioned items and plan of care.

## 2025-06-30 NOTE — PROGRESS NOTE ADULT - PROBLEM SELECTOR PROBLEM 5
CVA (cerebrovascular accident)
HLD (hyperlipidemia)
CVA (cerebrovascular accident)
HLD (hyperlipidemia)
CVA (cerebrovascular accident)
HLD (hyperlipidemia)
CVA (cerebrovascular accident)

## 2025-06-30 NOTE — OCCUPATIONAL THERAPY INITIAL EVALUATION ADULT - MD ORDER
Occupational Therapy (OT) to re-evaluate and treat. Ambulate with assistance. Per MANDY Eid, pt is okay to participate in OT evaluation and perform activity as tolerated.
Occupational Therapy (OT) to evaluate and treat. Ambulate with assistance. Per MANDY Benoit, pt is okay to participate in OT evaluation and perform activity as tolerated.

## 2025-06-30 NOTE — PROGRESS NOTE ADULT - PROBLEM SELECTOR PROBLEM 4
CVA (cerebrovascular accident)
DM2 (diabetes mellitus, type 2)
DM2 (diabetes mellitus, type 2)
CVA (cerebrovascular accident)
DM2 (diabetes mellitus, type 2)
CVA (cerebrovascular accident)
DM2 (diabetes mellitus, type 2)

## 2025-06-30 NOTE — PROVIDER CONTACT NOTE (OTHER) - DATE AND TIME:
30-Jun-2025 06:29
26-Jun-2025 05:35
26-Jun-2025 23:45
29-Jun-2025 04:45
27-Jun-2025 01:40
27-Jun-2025 05:15
20-Jun-2025 22:58
30-Jun-2025 13:00

## 2025-06-30 NOTE — OCCUPATIONAL THERAPY INITIAL EVALUATION ADULT - LEVEL OF INDEPENDENCE: SIT/STAND, REHAB EVAL
Initiated with Maximum Assistance x1; however, pt. unable to perform secondary to Right LE weakness and Left LE non-weight bearing status. Will continue to progress with pt as agreeable and tolerated./unable to perform
maximum assist (25% patients effort)

## 2025-06-30 NOTE — PROGRESS NOTE ADULT - PROBLEM SELECTOR PROBLEM 6
HLD (hyperlipidemia)
HLD (hyperlipidemia)
Stage 2 chronic kidney disease
Stage 2 chronic kidney disease
HLD (hyperlipidemia)
Stage 2 chronic kidney disease
HLD (hyperlipidemia)
Stage 2 chronic kidney disease
Stage 2 chronic kidney disease

## 2025-06-30 NOTE — OCCUPATIONAL THERAPY INITIAL EVALUATION ADULT - DIAGNOSIS, OT EVAL
s/p Left Ankle ORIF; Decreased functional mobility; Decreased ADL management
s/p Left Ankle casting and reduction of acute displaced fractures of the lateral and medial malleoli and distal fibular metadiaphysis; Hx of recent L4 vertebral augmentation; Decreased functional mobility; Decreased ADL management

## 2025-06-30 NOTE — PROGRESS NOTE ADULT - SUBJECTIVE AND OBJECTIVE BOX
ORTHOPEDIC PROGRESS NOTE    Overnight events: None    SUBJECTIVE: Pt seen and examined at bedside. Patient is doing well, no acute complaints this AM. Pain is controlled with medication      OBJECTIVE:  Vital Signs Last 24 Hrs  T(C): 36.6 (30 Jun 2025 05:24), Max: 37 (29 Jun 2025 15:07)  T(F): 97.8 (30 Jun 2025 05:24), Max: 98.6 (29 Jun 2025 15:07)  HR: 82 (30 Jun 2025 05:24) (79 - 83)  BP: 164/82 (30 Jun 2025 05:24) (140/68 - 164/82)  BP(mean): --  RR: 17 (30 Jun 2025 05:24) (17 - 18)  SpO2: 100% (30 Jun 2025 05:24) (97% - 100%)    Parameters below as of 30 Jun 2025 05:24  Patient On (Oxygen Delivery Method): room air            Physical Examination:  GEN: NAD, resting quietly  PULM: symmetric chest rise bilaterally, no increased WOB  ABD: nondistended  EXTR:   LLE:  in short leg cast  wiggling toes  toes wwp  SILT toes      LABS:                        10.5   7.84  )-----------( 309      ( 29 Jun 2025 10:00 )             30.9       06-29    134[L]  |  101  |  28[H]  ----------------------------<  283[H]  4.2   |  20[L]  |  0.81    Ca    8.4      29 Jun 2025 10:15  Phos  2.3     06-29  Mg     2.40     06-29

## 2025-07-01 ENCOUNTER — TRANSCRIPTION ENCOUNTER (OUTPATIENT)
Age: 85
End: 2025-07-01

## 2025-07-01 VITALS
TEMPERATURE: 99 F | RESPIRATION RATE: 16 BRPM | OXYGEN SATURATION: 99 % | SYSTOLIC BLOOD PRESSURE: 147 MMHG | DIASTOLIC BLOOD PRESSURE: 60 MMHG | HEART RATE: 76 BPM

## 2025-07-01 LAB
ANION GAP SERPL CALC-SCNC: 7 MMOL/L — SIGNIFICANT CHANGE UP (ref 7–14)
BUN SERPL-MCNC: 26 MG/DL — HIGH (ref 7–23)
CALCIUM SERPL-MCNC: 9.3 MG/DL — SIGNIFICANT CHANGE UP (ref 8.4–10.5)
CHLORIDE SERPL-SCNC: 103 MMOL/L — SIGNIFICANT CHANGE UP (ref 98–107)
CO2 SERPL-SCNC: 23 MMOL/L — SIGNIFICANT CHANGE UP (ref 22–31)
CREAT SERPL-MCNC: 0.67 MG/DL — SIGNIFICANT CHANGE UP (ref 0.5–1.3)
EGFR: 87 ML/MIN/1.73M2 — SIGNIFICANT CHANGE UP
EGFR: 87 ML/MIN/1.73M2 — SIGNIFICANT CHANGE UP
GLUCOSE BLDC GLUCOMTR-MCNC: 168 MG/DL — HIGH (ref 70–99)
GLUCOSE BLDC GLUCOMTR-MCNC: 256 MG/DL — HIGH (ref 70–99)
GLUCOSE SERPL-MCNC: 147 MG/DL — HIGH (ref 70–99)
HCT VFR BLD CALC: 32.4 % — LOW (ref 34.5–45)
HGB BLD-MCNC: 11.1 G/DL — LOW (ref 11.5–15.5)
MAGNESIUM SERPL-MCNC: 2.2 MG/DL — SIGNIFICANT CHANGE UP (ref 1.6–2.6)
MCHC RBC-ENTMCNC: 31.1 PG — SIGNIFICANT CHANGE UP (ref 27–34)
MCHC RBC-ENTMCNC: 34.3 G/DL — SIGNIFICANT CHANGE UP (ref 32–36)
MCV RBC AUTO: 90.8 FL — SIGNIFICANT CHANGE UP (ref 80–100)
NRBC # BLD AUTO: 0 K/UL — SIGNIFICANT CHANGE UP (ref 0–0)
NRBC # FLD: 0 K/UL — SIGNIFICANT CHANGE UP (ref 0–0)
NRBC BLD AUTO-RTO: 0 /100 WBCS — SIGNIFICANT CHANGE UP (ref 0–0)
PHOSPHATE SERPL-MCNC: 2.6 MG/DL — SIGNIFICANT CHANGE UP (ref 2.5–4.5)
PLATELET # BLD AUTO: 334 K/UL — SIGNIFICANT CHANGE UP (ref 150–400)
PMV BLD: 10 FL — SIGNIFICANT CHANGE UP (ref 7–13)
POTASSIUM SERPL-MCNC: 4.7 MMOL/L — SIGNIFICANT CHANGE UP (ref 3.5–5.3)
POTASSIUM SERPL-SCNC: 4.7 MMOL/L — SIGNIFICANT CHANGE UP (ref 3.5–5.3)
RBC # BLD: 3.57 M/UL — LOW (ref 3.8–5.2)
RBC # FLD: 13.4 % — SIGNIFICANT CHANGE UP (ref 10.3–14.5)
SODIUM SERPL-SCNC: 133 MMOL/L — LOW (ref 135–145)
WBC # BLD: 7.15 K/UL — SIGNIFICANT CHANGE UP (ref 3.8–10.5)
WBC # FLD AUTO: 7.15 K/UL — SIGNIFICANT CHANGE UP (ref 3.8–10.5)

## 2025-07-01 PROCEDURE — 99239 HOSP IP/OBS DSCHRG MGMT >30: CPT

## 2025-07-01 RX ORDER — SENNA 187 MG
2 TABLET ORAL
Qty: 0 | Refills: 0 | DISCHARGE
Start: 2025-07-01

## 2025-07-01 RX ORDER — MAGNESIUM, ALUMINUM HYDROXIDE 200-200 MG
30 TABLET,CHEWABLE ORAL
Qty: 0 | Refills: 0 | DISCHARGE
Start: 2025-07-01

## 2025-07-01 RX ORDER — LANOLIN/MINERAL OIL/PETROLATUM
1 OINTMENT (GRAM) OPHTHALMIC (EYE)
Qty: 0 | Refills: 0 | DISCHARGE

## 2025-07-01 RX ORDER — DICLOFENAC SODIUM 10 MG/G
1 GEL TOPICAL
Refills: 0 | DISCHARGE

## 2025-07-01 RX ORDER — NIFEDIPINE 30 MG
1 TABLET, EXTENDED RELEASE 24 HR ORAL
Qty: 0 | Refills: 0 | DISCHARGE
Start: 2025-07-01

## 2025-07-01 RX ORDER — ASPIRIN 325 MG
1 TABLET ORAL
Qty: 0 | Refills: 0 | DISCHARGE
Start: 2025-07-01

## 2025-07-01 RX ORDER — OXYCODONE HYDROCHLORIDE 30 MG/1
1 TABLET ORAL
Qty: 0 | Refills: 0 | DISCHARGE
Start: 2025-07-01

## 2025-07-01 RX ORDER — MELATONIN 5 MG
1 TABLET ORAL
Qty: 0 | Refills: 0 | DISCHARGE
Start: 2025-07-01

## 2025-07-01 RX ORDER — OXYCODONE HYDROCHLORIDE 30 MG/1
2.5 TABLET ORAL
Qty: 0 | Refills: 0 | DISCHARGE
Start: 2025-07-01

## 2025-07-01 RX ADMIN — Medication 90 MILLIGRAM(S): at 05:22

## 2025-07-01 RX ADMIN — METOPROLOL SUCCINATE 25 MILLIGRAM(S): 50 TABLET, EXTENDED RELEASE ORAL at 05:22

## 2025-07-01 RX ADMIN — ISOSORBIDE MONONITRATE 30 MILLIGRAM(S): 60 TABLET, EXTENDED RELEASE ORAL at 13:11

## 2025-07-01 RX ADMIN — POLYETHYLENE GLYCOL 3350 17 GRAM(S): 17 POWDER, FOR SOLUTION ORAL at 13:13

## 2025-07-01 RX ADMIN — Medication 81 MILLIGRAM(S): at 13:12

## 2025-07-01 RX ADMIN — CALCITRIOL 0.25 MICROGRAM(S): 0.5 CAPSULE, GELATIN COATED ORAL at 13:12

## 2025-07-01 RX ADMIN — CLOPIDOGREL BISULFATE 75 MILLIGRAM(S): 75 TABLET, FILM COATED ORAL at 13:12

## 2025-07-01 RX ADMIN — INSULIN LISPRO 3: 100 INJECTION, SOLUTION INTRAVENOUS; SUBCUTANEOUS at 13:09

## 2025-07-01 RX ADMIN — Medication 1 TABLET(S): at 13:11

## 2025-07-01 RX ADMIN — Medication 1 APPLICATION(S): at 13:12

## 2025-07-01 RX ADMIN — INSULIN LISPRO 1: 100 INJECTION, SOLUTION INTRAVENOUS; SUBCUTANEOUS at 09:05

## 2025-07-01 NOTE — DISCHARGE NOTE NURSING/CASE MANAGEMENT/SOCIAL WORK - PATIENT PORTAL LINK FT
You can access the FollowMyHealth Patient Portal offered by [facility] by registering at the following website: [email or web address]. By joining [facility]’s FollowMyHealth portal, you will also be able to view your health information using other applications (apps) compatible with our system.

## 2025-07-01 NOTE — PROGRESS NOTE ADULT - SUBJECTIVE AND OBJECTIVE BOX
SUBJECTIVE  Patient seen and examined, resting comfortably in bed. No acute events overnight. Pain controlled. Denies fevers/chills, chest pain, or dyspnea.    OBJECTIVE  Vital Signs Last 24 Hrs  T(C): 37 (30 Jun 2025 21:25), Max: 37 (30 Jun 2025 21:25)  T(F): 98.6 (30 Jun 2025 21:25), Max: 98.6 (30 Jun 2025 21:25)  HR: 79 (30 Jun 2025 21:25) (67 - 81)  BP: 151/70 (30 Jun 2025 21:25) (141/64 - 151/70)  BP(mean): --  RR: 18 (30 Jun 2025 21:25) (17 - 18)  SpO2: 100% (30 Jun 2025 21:25) (100% - 100%)    Parameters below as of 30 Jun 2025 21:25  Patient On (Oxygen Delivery Method): room air        PHYSICAL EXAM  Gen: Lying in bed, NAD  Resp: even, nonlabored breathing  LLE:  Cast c/d/i, compartments soft, no calf TTP b/l  Motor: Toe flexion/extension intact  Sensory: DP/SP/Tib/toes SILT  Toes WWP      LABS                        10.5   7.84  )-----------( 309      ( 29 Jun 2025 10:00 )             30.9     06-30    136  |  105  |  28[H]  ----------------------------<  188[H]  4.9   |  16[L]  |  0.68    Ca    8.7      30 Jun 2025 06:15  Phos  2.5     06-30  Mg     2.20     06-30

## 2025-07-01 NOTE — DISCHARGE NOTE NURSING/CASE MANAGEMENT/SOCIAL WORK - FINANCIAL ASSISTANCE
[facility] provides services at a reduced cost to those who are determined to be eligible through [facility]’s financial assistance program. Information regarding [facility]’s financial assistance program can be found by going to [email or web address] or by calling [phone].

## 2025-07-01 NOTE — PROGRESS NOTE ADULT - REASON FOR ADMISSION
Fall with left ankle fracture

## 2025-07-01 NOTE — PROGRESS NOTE ADULT - PROVIDER SPECIALTY LIST ADULT
Orthopedics
Hospitalist

## 2025-07-01 NOTE — PROGRESS NOTE ADULT - ASSESSMENT
84yFemale s/p L ankle ORIF in [facility] 6/27/25    PLAN   - NWB LLE in [facility] w strict elevation  - Pain control  - Mechanical/DVT ppx, ASA 81 + plavix  - OOB/PT/OT  - dispo: pending STEVE  - Orthopedically stable for discharge. Please have patient FU with Dr. Thompson 1 week after discharge, call his office to make appointment      For all questions related to patient care, please reach out via the on-call pager*    Jenny Trivedi MD, PGY-3  Orthopaedic Surgery  [facility] [phone]  [facility]        [id number]  [facility]  [phone]/ [phone] 84yFemale s/p L ankle ORIF in [facility] 6/27/25    PLAN   - NWB LLE in [facility] w strict elevation  - Pain control  - Mechanical/DVT ppx, ASA 81 + plavix  - OOB/PT/OT  - dispo: pending STEVE  - Orthopedically stable for discharge. Orthopedics to sign off- please reach out with questions/concerns.   - Please have patient FU with Dr. Thompson in 10 days (around 7/11) - call his office to make appointment      For all questions related to patient care, please reach out via the on-call pager*    Jenny Trivedi MD, PGY-3  Orthopaedic Surgery  [facility] [phone]  [facility]        [phone]  [facility]  [phone]/ [phone]

## 2025-07-10 ENCOUNTER — APPOINTMENT (OUTPATIENT)
Dept: ORTHOPEDIC SURGERY | Facility: CLINIC | Age: 85
End: 2025-07-10

## 2025-07-10 PROCEDURE — 73610 X-RAY EXAM OF ANKLE: CPT | Mod: 26,LT

## 2025-07-10 PROCEDURE — 99024 POSTOP FOLLOW-UP VISIT: CPT

## 2025-07-10 RX ORDER — CEPHALEXIN 250 MG/1
250 CAPSULE ORAL 3 TIMES DAILY
Qty: 21 | Refills: 0 | Status: ACTIVE | COMMUNITY
Start: 2025-07-10 | End: 1900-01-01

## 2025-07-15 ENCOUNTER — APPOINTMENT (OUTPATIENT)
Dept: RADIOLOGY | Facility: HOSPITAL | Age: 85
End: 2025-07-15

## 2025-07-15 ENCOUNTER — NON-APPOINTMENT (OUTPATIENT)
Age: 85
End: 2025-07-15

## 2025-07-15 ENCOUNTER — OUTPATIENT (OUTPATIENT)
Dept: OUTPATIENT SERVICES | Facility: HOSPITAL | Age: 85
LOS: 1 days | End: 2025-07-15
Payer: MEDICARE

## 2025-07-15 ENCOUNTER — APPOINTMENT (OUTPATIENT)
Dept: INTERVENTIONAL RADIOLOGY/VASCULAR | Facility: CLINIC | Age: 85
End: 2025-07-15
Payer: MEDICARE

## 2025-07-15 VITALS — BODY MASS INDEX: 21.71 KG/M2 | HEIGHT: 62 IN | WEIGHT: 118 LBS

## 2025-07-15 PROBLEM — M48.50XA VERTEBRAL COMPRESSION FRACTURE: Status: ACTIVE | Noted: 2025-07-02

## 2025-07-15 PROCEDURE — 72100 X-RAY EXAM L-S SPINE 2/3 VWS: CPT | Mod: 26

## 2025-07-15 PROCEDURE — 99215 OFFICE O/P EST HI 40 MIN: CPT

## 2025-07-15 RX ORDER — CEPHALEXIN 500 MG/1
500 CAPSULE ORAL
Refills: 0 | Status: ACTIVE | COMMUNITY

## 2025-07-15 RX ORDER — OXYCODONE 5 MG/1
5 TABLET ORAL
Refills: 0 | Status: ACTIVE | COMMUNITY

## 2025-07-15 RX ORDER — ASPIRIN 81 MG/1
81 TABLET, DELAYED RELEASE ORAL
Refills: 0 | Status: ACTIVE | COMMUNITY

## 2025-07-23 ENCOUNTER — APPOINTMENT (OUTPATIENT)
Dept: ORTHOPEDIC SURGERY | Facility: CLINIC | Age: 85
End: 2025-07-23
Payer: MEDICARE

## 2025-07-23 DIAGNOSIS — S82.892D OTHER FRACTURE OF LEFT LOWER LEG, SUBSEQUENT ENCOUNTER FOR CLOSED FRACTURE WITH ROUTINE HEALING: ICD-10-CM

## 2025-07-23 DIAGNOSIS — Z98.890 OTHER SPECIFIED POSTPROCEDURAL STATES: ICD-10-CM

## 2025-07-23 PROCEDURE — 99024 POSTOP FOLLOW-UP VISIT: CPT

## 2025-07-23 PROCEDURE — 73610 X-RAY EXAM OF ANKLE: CPT | Mod: LT

## 2025-08-20 ENCOUNTER — APPOINTMENT (OUTPATIENT)
Dept: ORTHOPEDIC SURGERY | Facility: CLINIC | Age: 85
End: 2025-08-20
Payer: MEDICARE

## 2025-08-20 VITALS — BODY MASS INDEX: 22.08 KG/M2 | WEIGHT: 120 LBS | HEIGHT: 62 IN

## 2025-08-20 PROCEDURE — 73610 X-RAY EXAM OF ANKLE: CPT | Mod: LT

## 2025-08-20 PROCEDURE — 99024 POSTOP FOLLOW-UP VISIT: CPT

## (undated) DEVICE — STAPLER SKIN VISI-STAT 35 WIDE

## (undated) DEVICE — DRILL BIT ARTHREX CALIBRATED 2.5MM

## (undated) DEVICE — SUT VICRYL PLUS 2-0 27" FS-1 UNDYED

## (undated) DEVICE — DRAPE U (BLUE) 60 X 60"

## (undated) DEVICE — LIJ-SYNTHES LOCKING SMALL FRAGMENT (REQUIRES BILL) (IMPLANT): Type: DURABLE MEDICAL EQUIPMENT

## (undated) DEVICE — ELCTR GROUNDING PAD ADULT COVIDIEN

## (undated) DEVICE — SUT NYLON 3-0 18" PS-2

## (undated) DEVICE — DRAPE IOBAN 33" X 23"

## (undated) DEVICE — DRILL BIT ARTHREX CANN 2MM

## (undated) DEVICE — ELCTR BOVIE PENCIL SMOKE EVACUATION

## (undated) DEVICE — TOURNIQUET CUFF 34" DUAL PORT W PLC

## (undated) DEVICE — PREP SCRUB BRUSH W CHG 4%

## (undated) DEVICE — VENODYNE/SCD SLEEVE CALF MEDIUM

## (undated) DEVICE — SUT MONOCRYL 4-0 27" PS-2 UNDYED

## (undated) DEVICE — WARMING BLANKET UPPER ADULT

## (undated) DEVICE — DRILL BIT ARTHREX 2.0MM

## (undated) DEVICE — DRAPE COVER SNAP 36X30"

## (undated) DEVICE — SUT VICRYL PLUS 0 27" OS-6 UNDYED

## (undated) DEVICE — SOL IRR POUR NS 0.9% 1500ML

## (undated) DEVICE — DRSG WEBRIL 3"

## (undated) DEVICE — DRSG KLING 4"

## (undated) DEVICE — DRAPE SURGICAL #1010

## (undated) DEVICE — POSITIONER STRAP ARMBOARD VELCRO TS-30

## (undated) DEVICE — PREP CHLORAPREP HI-LITE ORANGE 26ML

## (undated) DEVICE — DRSG ACE BANDAGE 6"

## (undated) DEVICE — TOURNIQUET ESMARK 6"

## (undated) DEVICE — SOL IRR POUR H2O 1500ML

## (undated) DEVICE — DRSG COBAN 4"

## (undated) DEVICE — DRSG CURITY GAUZE SPONGE 4 X 4" 12-PLY

## (undated) DEVICE — PACK LIJ BASIC ORTHO

## (undated) DEVICE — LAP PAD W RING 18 X 18"

## (undated) DEVICE — FRAZIER SUCTION TIP 8FR

## (undated) DEVICE — LABELS BLANK W PEN